# Patient Record
Sex: MALE | Race: WHITE | NOT HISPANIC OR LATINO | Employment: OTHER | URBAN - METROPOLITAN AREA
[De-identification: names, ages, dates, MRNs, and addresses within clinical notes are randomized per-mention and may not be internally consistent; named-entity substitution may affect disease eponyms.]

---

## 2017-03-21 RX ORDER — ATENOLOL 50 MG/1
TABLET ORAL
Qty: 60 TABLET | Refills: 0 | Status: SHIPPED | OUTPATIENT
Start: 2017-03-21 | End: 2017-04-28 | Stop reason: SDUPTHER

## 2017-03-21 NOTE — TELEPHONE ENCOUNTER
Notify patient that his atenolol prescription was sent in; needs to schedule follow-up appointment for reassessment if not already done if he has already done please keep his follow-up

## 2017-03-21 NOTE — TELEPHONE ENCOUNTER
----- Message from Audrey Currie sent at 3/21/2017 12:52 PM CDT -----  Contact: Patient  Jadiel, Patient [phone], Patient is calling about recent visit to urgent care for knee pain. He was told he had an infection in the right knee and was given medication. He is also worried about it being septic and is inquiring about possible blood work needed. Please advise. Thanks.

## 2017-03-21 NOTE — TELEPHONE ENCOUNTER
Spoke with pt and informed him that he needs to come in for evaluation pt scheduled appt for 03/27/2017 @ 3:00 pm.

## 2017-03-25 RX ORDER — AMLODIPINE BESYLATE 5 MG/1
5 TABLET ORAL DAILY
COMMUNITY
End: 2017-03-27 | Stop reason: SDUPTHER

## 2017-03-25 RX ORDER — TALC
POWDER (GRAM) TOPICAL DAILY
COMMUNITY
End: 2017-03-27

## 2017-03-25 RX ORDER — ALBUTEROL SULFATE 90 UG/1
2 AEROSOL, METERED RESPIRATORY (INHALATION) EVERY 6 HOURS PRN
COMMUNITY
End: 2017-04-27 | Stop reason: SDUPTHER

## 2017-03-25 RX ORDER — INDAPAMIDE 1.25 MG/1
2.5 TABLET ORAL DAILY
COMMUNITY
End: 2017-03-27 | Stop reason: SDUPTHER

## 2017-03-25 RX ORDER — LISINOPRIL 20 MG/1
20 TABLET ORAL 2 TIMES DAILY
COMMUNITY
End: 2017-11-28 | Stop reason: SDUPTHER

## 2017-03-25 RX ORDER — MULTIVITAMIN
1 TABLET ORAL DAILY
COMMUNITY
End: 2022-11-18

## 2017-03-25 RX ORDER — METFORMIN HYDROCHLORIDE 500 MG/1
500 TABLET ORAL 2 TIMES DAILY WITH MEALS
COMMUNITY
End: 2017-05-12 | Stop reason: SDUPTHER

## 2017-03-25 RX ORDER — ASPIRIN 81 MG/1
81 TABLET ORAL DAILY
COMMUNITY
End: 2018-10-03

## 2017-03-25 RX ORDER — LANCETS 28 GAUGE
EACH MISCELLANEOUS
COMMUNITY
End: 2020-03-12

## 2017-03-25 RX ORDER — PHENYLEPHRINE HCL 10 MG
500 TABLET ORAL DAILY
COMMUNITY
End: 2018-10-03

## 2017-03-25 RX ORDER — ATORVASTATIN CALCIUM 20 MG/1
20 TABLET, FILM COATED ORAL DAILY
COMMUNITY
End: 2017-05-18

## 2017-03-27 ENCOUNTER — OFFICE VISIT (OUTPATIENT)
Dept: FAMILY MEDICINE | Facility: CLINIC | Age: 72
End: 2017-03-27
Payer: MEDICARE

## 2017-03-27 VITALS
BODY MASS INDEX: 31.23 KG/M2 | DIASTOLIC BLOOD PRESSURE: 80 MMHG | OXYGEN SATURATION: 96 % | TEMPERATURE: 98 F | HEART RATE: 71 BPM | SYSTOLIC BLOOD PRESSURE: 139 MMHG | HEIGHT: 70 IN | WEIGHT: 218.13 LBS

## 2017-03-27 DIAGNOSIS — E11.9 TYPE 2 DIABETES MELLITUS WITHOUT COMPLICATION, WITHOUT LONG-TERM CURRENT USE OF INSULIN: ICD-10-CM

## 2017-03-27 DIAGNOSIS — Z15.89 METHYLENETETRAHYDROFOLATE REDUCTASE (MTHFR) GENE MUTATION: ICD-10-CM

## 2017-03-27 DIAGNOSIS — M25.561 PAIN AND SWELLING OF RIGHT KNEE: ICD-10-CM

## 2017-03-27 DIAGNOSIS — E03.9 HYPOTHYROIDISM, UNSPECIFIED TYPE: ICD-10-CM

## 2017-03-27 DIAGNOSIS — L03.115 CELLULITIS OF KNEE, RIGHT: Primary | ICD-10-CM

## 2017-03-27 DIAGNOSIS — I38 VALVULAR HEART DISEASE: ICD-10-CM

## 2017-03-27 DIAGNOSIS — G47.33 OSA (OBSTRUCTIVE SLEEP APNEA): ICD-10-CM

## 2017-03-27 DIAGNOSIS — M25.461 PAIN AND SWELLING OF RIGHT KNEE: ICD-10-CM

## 2017-03-27 DIAGNOSIS — I65.29 CAROTID ARTERY PLAQUE, UNSPECIFIED LATERALITY: ICD-10-CM

## 2017-03-27 DIAGNOSIS — D64.9 ANEMIA, UNSPECIFIED TYPE: ICD-10-CM

## 2017-03-27 DIAGNOSIS — E78.2 MIXED HYPERLIPIDEMIA: ICD-10-CM

## 2017-03-27 DIAGNOSIS — E66.09 NON MORBID OBESITY DUE TO EXCESS CALORIES: ICD-10-CM

## 2017-03-27 DIAGNOSIS — I10 ESSENTIAL HYPERTENSION: ICD-10-CM

## 2017-03-27 LAB — GLUCOSE SERPL-MCNC: 107 MG/DL (ref 70–110)

## 2017-03-27 PROCEDURE — 4010F ACE/ARB THERAPY RXD/TAKEN: CPT | Mod: S$GLB,,, | Performed by: INTERNAL MEDICINE

## 2017-03-27 PROCEDURE — 1157F ADVNC CARE PLAN IN RCRD: CPT | Mod: S$GLB,,, | Performed by: INTERNAL MEDICINE

## 2017-03-27 PROCEDURE — 1125F AMNT PAIN NOTED PAIN PRSNT: CPT | Mod: S$GLB,,, | Performed by: INTERNAL MEDICINE

## 2017-03-27 PROCEDURE — 1160F RVW MEDS BY RX/DR IN RCRD: CPT | Mod: S$GLB,,, | Performed by: INTERNAL MEDICINE

## 2017-03-27 PROCEDURE — 3079F DIAST BP 80-89 MM HG: CPT | Mod: S$GLB,,, | Performed by: INTERNAL MEDICINE

## 2017-03-27 PROCEDURE — 99999 PR PBB SHADOW E&M-EST. PATIENT-LVL III: CPT | Mod: PBBFAC,,, | Performed by: INTERNAL MEDICINE

## 2017-03-27 PROCEDURE — 3046F HEMOGLOBIN A1C LEVEL >9.0%: CPT | Mod: S$GLB,,, | Performed by: INTERNAL MEDICINE

## 2017-03-27 PROCEDURE — 82948 REAGENT STRIP/BLOOD GLUCOSE: CPT | Mod: S$GLB,,, | Performed by: INTERNAL MEDICINE

## 2017-03-27 PROCEDURE — 3075F SYST BP GE 130 - 139MM HG: CPT | Mod: S$GLB,,, | Performed by: INTERNAL MEDICINE

## 2017-03-27 PROCEDURE — 99215 OFFICE O/P EST HI 40 MIN: CPT | Mod: S$GLB,,, | Performed by: INTERNAL MEDICINE

## 2017-03-27 PROCEDURE — 1159F MED LIST DOCD IN RCRD: CPT | Mod: S$GLB,,, | Performed by: INTERNAL MEDICINE

## 2017-03-27 PROCEDURE — 2022F DILAT RTA XM EVC RTNOPTHY: CPT | Mod: S$GLB,,, | Performed by: INTERNAL MEDICINE

## 2017-03-27 RX ORDER — AMLODIPINE BESYLATE 5 MG/1
5 TABLET ORAL DAILY
Qty: 30 TABLET | Refills: 3 | Status: SHIPPED | OUTPATIENT
Start: 2017-03-27 | End: 2017-06-02

## 2017-03-27 RX ORDER — DICLOXACILLIN SODIUM 500 MG/1
CAPSULE ORAL
Refills: 0 | COMMUNITY
Start: 2017-03-22 | End: 2017-05-18 | Stop reason: SDUPTHER

## 2017-03-27 RX ORDER — SULFAMETHOXAZOLE AND TRIMETHOPRIM 800; 160 MG/1; MG/1
1 TABLET ORAL 2 TIMES DAILY
Qty: 20 TABLET | Refills: 0 | Status: SHIPPED | OUTPATIENT
Start: 2017-03-27 | End: 2017-05-18 | Stop reason: ALTCHOICE

## 2017-03-27 RX ORDER — INDAPAMIDE 1.25 MG/1
1.25 TABLET ORAL DAILY
Qty: 30 TABLET | Refills: 3 | Status: SHIPPED | OUTPATIENT
Start: 2017-03-27 | End: 2017-06-01 | Stop reason: SDUPTHER

## 2017-03-27 RX ORDER — DOXYCYCLINE 100 MG/1
100 CAPSULE ORAL EVERY 12 HOURS
Qty: 20 CAPSULE | Refills: 0 | Status: SHIPPED | OUTPATIENT
Start: 2017-03-27 | End: 2017-05-18 | Stop reason: ALTCHOICE

## 2017-03-27 RX ORDER — OXYCODONE AND ACETAMINOPHEN 5; 325 MG/1; MG/1
TABLET ORAL
Refills: 0 | COMMUNITY
Start: 2017-03-21 | End: 2017-05-18

## 2017-03-27 NOTE — MR AVS SNAPSHOT
[facility]  [address]  [place] [address]  Phone: [phone]  Fax: [phone]                  Jadiel Rossi   3/27/2017 3:00 PM   Office Visit    Description:  Male : 1945   Provider:  Eliud Chandler MD   Department:  [facility]           Reason for Visit     Follow-up     Knee Pain           Diagnoses this Visit        Comments    Cellulitis of knee, right    -  Primary     Anemia, unspecified type         ROBBY (obstructive sleep apnea)         Non morbid obesity due to excess calories         Hypothyroidism, unspecified type         Essential hypertension         Mixed hyperlipidemia         Type 2 diabetes mellitus without complication, without long-term current use of insulin         Valvular heart disease         Carotid artery plaque, unspecified laterality         Methylenetetrahydrofolate reductase (MTHFR) gene mutation                To Do List           Future Appointments        Provider Department Dept Phone    5/15/2017 3:40 PM Eliud Chandler MD [facility] [phone]      Goals (5 Years of Data)     None      Follow-Up and Disposition     Return in about 10 days (around 2017) for Reassessment and go over her labs.    Follow-up and Disposition History       These Medications        Disp Refills Start End    amlodipine (NORVASC) 5 MG tablet 30 tablet 3 3/27/2017     Take 1 tablet (5 mg total) by mouth once daily. - Oral    Pharmacy: [facility] [address] [place] [address] AT [place] [address] & [facility] Ph #: [phone]       indapamide (LOZOL) 1.25 MG Tab 30 tablet 3 3/27/2017     Take 1 tablet (1.25 mg total) by mouth once daily. - Oral    Pharmacy: [facility] [place] [address] AT [place] [address] & [facility] Ph #: [phone]       doxycycline (VIBRAMYCIN) 100 MG Cap 20 capsule 0 3/27/2017     Take 1 capsule (100 mg total) by mouth every  12 (twelve) hours. Take for 10 days pc. - Oral    Pharmacy: [facility] [address] [place] [address] Ph #: [phone]       sulfamethoxazole-trimethoprim 800-160mg (BACTRIM DS) 800-160 mg Tab 20 tablet 0 3/27/2017     Take 1 tablet by mouth 2 (two) times daily. Take for 10 days pc. - Oral    Pharmacy: [facility] [address] Ph #: [phone]         [facility]     [facility] - 24/7 Assistance  Registered nurses in the [facility] provide clinical advisement, health education, appointment booking, and other advisory services.  Call for this free service at [phone].             Medications           Message regarding Medications     Verify the changes and/or additions to your medication regime listed below are the same as discussed with your clinician today.  If any of these changes or additions are incorrect, please notify your healthcare provider.        START taking these NEW medications        Refills    doxycycline (VIBRAMYCIN) 100 MG Cap 0    Sig: Take 1 capsule (100 mg total) by mouth every 12 (twelve) hours. Take for 10 days pc.    Class: Normal    Route: Oral    sulfamethoxazole-trimethoprim 800-160mg (BACTRIM DS) 800-160 mg Tab 0    Sig: Take 1 tablet by mouth 2 (two) times daily. Take for 10 days pc.    Class: Normal    Route: Oral      CHANGE how you are taking these medications     Start Taking Instead of    amlodipine (NORVASC) 5 MG tablet amlodipine (NORVASC) 5 MG tablet    Dosage:  Take 1 tablet (5 mg total) by mouth once daily. Dosage:  Take 5 mg by mouth once daily.    Reason for Change:  Reorder     indapamide (LOZOL) 1.25 MG Tab indapamide (LOZOL) 1.25 MG Tab    Dosage:  Take 1 tablet (1.25 mg total) by mouth once daily. Dosage:  Take 2.5 mg by mouth once daily.    Reason for Change:  Reorder       STOP taking these medications      magnesium oxide (MAG-OX) 250 mg Tab Take by mouth once daily.           Verify that the below list of medications is an accurate representation of the medications you are currently taking.  If none reported, the list may be blank. If incorrect, please contact your healthcare provider. Carry this list with you in case of emergency.           Current Medications     albuterol (VENTOLIN HFA) 90 mcg/actuation inhaler Inhale 2 puffs into the lungs every 6 (six) hours as needed for Wheezing. Rescue    amlodipine (NORVASC) 5 MG tablet Take 1 tablet (5 mg total) by mouth once daily.    aspirin (ECOTRIN) 81 MG EC tablet Take 81 mg by mouth once daily.    atenolol (TENORMIN) 50 MG tablet TAKE 1 TABLET BY MOUTH TWICE DAILY    atorvastatin (LIPITOR) 20 MG tablet Take 20 mg by mouth once daily.    blood sugar diagnostic (BLOOD GLUCOSE TEST) Strp by Misc.(Non-Drug; Combo Route) route.    cinnamon bark (CINNAMON) 500 mg capsule Take 500 mg by mouth once daily.    dicloxacillin (DYNAPEN) 500 MG capsule TK ONE C PO Q 8 H AFTER MEALS FOR 10 DAYS    DOCOSAHEXANOIC ACID/EPA (FISH OIL ORAL) Take 1,200 mg by mouth 2 (two) times daily.    indapamide (LOZOL) 1.25 MG Tab Take 1 tablet (1.25 mg total) by mouth once daily.    lancets (EASY TOUCH LANCETS) 28 gauge Misc by Misc.(Non-Drug; Combo Route) route.    lisinopril (PRINIVIL,ZESTRIL) 20 MG tablet Take 20 mg by mouth 2 (two) times daily.    metformin (GLUCOPHAGE) 500 MG tablet Take 500 mg by mouth 2 (two) times daily with meals.    multivitamin (ONE DAILY MULTIVITAMIN) per tablet Take 1 tablet by mouth once daily.    TURMERIC ROOT EXTRACT ORAL Take 500 mg by mouth once daily.    doxycycline (VIBRAMYCIN) 100 MG Cap Take 1 capsule (100 mg total) by mouth every 12 (twelve) hours. Take for 10 days pc.    oxycodone-acetaminophen (PERCOCET) 5-325 mg per tablet TK 1 T PO Q 8 H PRN    sulfamethoxazole-trimethoprim 800-160mg (BACTRIM DS) 800-160 mg Tab Take 1 tablet by mouth 2 (two) times  "daily. Take for 10 days pc.           Clinical Reference Information           Your Vitals Were     BP Pulse Temp Height Weight SpO2    139/80 (BP Location: Right arm, Patient Position: Sitting, BP Method: Manual) 71 98.3 °F (36.8 °C) (Oral) 5' 10" (1.778 m) 98.9 kg (218 lb 2.3 oz) 96%    BMI                31.3 kg/m2          Blood Pressure          Most Recent Value    BP  139/80      Allergies as of 3/27/2017     No Known Allergies      Immunizations Administered on Date of Encounter - 3/27/2017     None      Orders Placed During Today's Visit      Normal Orders This Visit    POCT Glucose     Future Labs/Procedures Expected by Expires    CBC auto differential  3/27/2017 5/26/2018    HEMOGLOBIN A1C  3/27/2017 5/26/2018    Homocysteine, serum  3/27/2017 5/26/2018    Magnesium  3/27/2017 5/26/2018    T3, free  3/27/2017 5/26/2018    T4, free  3/27/2017 5/26/2018    TSH  3/27/2017 5/26/2018    Uric acid  3/27/2017 5/26/2018    Urinalysis  3/27/2017 5/26/2018    Comprehensive metabolic panel  6/25/2017 5/26/2018    Lipid panel  6/25/2017 5/26/2018         3/27/2017  4:36 PM - Aliyah Whatley MA      Component Results     Component Value Flag Ref Range Units Status    POC Glucose 107  70 - 110 mg/dL Final            [facility] Sign-Up     Activating your [facility] account is as easy as 1-2-3!     1) Visit [email or web address], select Sign Up Now, enter this activation code and your date of birth, then select Next.  [email or web address]  Expires: 5/11/2017  3:30 PM      2) Create a username and password to use when you visit [facility] in the future and select a security question in case you lose your password and select Next.    3) Enter your e-mail address and click Sign Up!    Additional Information  If you have questions, please e-mail [email or web address] or call [phone] to talk to our [facility] staff. Remember, [facility] is NOT to be used for urgent needs. For medical emergencies, dial 911.         Language " Assistance Services     ATTENTION: Language assistance services are available, free of charge. Please call [phone].      ATENCIÓN: Si habla melissa, tiene a mcdaniels disposición servicios gratuitos de asistencia lingüística. Llame al [phone].     CHÚ Ý: N?u b?n nói Ti?ng Vi?t, có các d?ch v? h? tr? ngôn ng? mi?n phí dành cho b?n. G?i s? [phone].         [facility] complies with applicable Federal civil rights laws and does not discriminate on the basis of race, color, national origin, age, disability, or sex.

## 2017-03-27 NOTE — PROGRESS NOTES
Subjective:       Patient ID: Jadiel Rossi is a 71 y.o. male.    Chief Complaint: Follow-up (establish care ) and Knee Pain (right knee x 10 days )    HPI patient's a very pleasant 71-year-old established male patient of mine here to get re-established with me at the [facility].  He has a history of diabetes mellitus type 2, essential hypertension, hypothyroidism, mixed hyperlipidemia, obstructive sleep apnea and obesity. He has been reportedly  out of his amlodipine for 3 weeks, as well as his indapamide.  His capillary blood glucoses at home have been averaging 104-106.  He does watch his diabetic diet.  However he has not been monitoring his blood pressures at home.  This will be resumed upon reminding him.  His blood pressure today is 139/80 with a pulse 71.  He exercises with walking with school; he is a .      He sees cardiology  and has a left heart catheterization pending; this put on hold due to cellulitis of his R knee.  He reportedly has a high calcium score on evaluation and a positive nuclear exercise stress test with a normal stress EKG.;  Review of old records his last office visit seen by me that's available as 2/1/17.  He has no complaints of chest pain, shortness of breath or palpitations.      Additionally he's on dicloxacillin 500 mg every 8 hours for a 10 day course has been on it for roughly 1 week for right knee cellulitis he reportedly went to work at [employer] lifted about a 60 pound [place] chicken swelling around and placed it at lower level X days ago and twisted his knee pain at night and initially was 7-8/10.  He is reportedly used Advil with ice applications.  He went to [facility] Monday morning; roughly about 1 week ago; a 3 view x-ray of the knee there was negative by his account and he was placed on Percocet 5/325 one every 8 hours as needed for pain; review x-ray report from 3/21/17 of the right knee revealed regional degenerative  changes present.  No acute fracture dislocation is identified.  No large joint effusion or patellar tilt.  Scattered vascular calcifications are noted.  Impression:  right knee degenerative changes with no convincing acute osseous abnormality.  He was also placed on dicloxacillin 500 mg 1 every 8 hours for 10 days for cellulitis.  No complaints of fever or chills.  He also reportedly walked into a table or counter hitting the lateral aspect of his knee; the details which are vague.      Various diagnosis's were discussed as well as plan of care.  Medications were addressed and blood work was probably ordered for his follow-up.  Total time 3:25 PM through 4:25 PM.  Appropriate labs were ordered for follow-up.     Review of Systems   Constitutional: Negative for appetite change, fatigue, fever and unexpected weight change.   HENT: Negative for congestion, postnasal drip and sinus pressure.         Seasonal allergies/ perennial as well.   Eyes: Negative for discharge and itching.   Respiratory: Negative for cough, chest tightness, shortness of breath and wheezing.    Cardiovascular: Negative for chest pain, palpitations and leg swelling.   Gastrointestinal: Negative for abdominal pain, blood in stool, constipation, diarrhea, nausea and vomiting.   Endocrine:        DM2/ HLP/ hypothyroidism, not on supplements.   Genitourinary: Negative for dysuria, hematuria and urgency.   Musculoskeletal: Negative for arthralgias and myalgias.        Variable MSK pain.   Skin: Negative for rash.   Allergic/Immunologic: Positive for environmental allergies. Negative for food allergies and immunocompromised state.   Neurological: Negative for tremors, seizures, syncope and headaches.   Hematological: Negative for adenopathy. Does not bruise/bleed easily.   Psychiatric/Behavioral:        Denies anxiety or depression.       Objective:      Vitals:    03/27/17 1511   BP: 139/80   BP Location: Right arm   Patient Position: Sitting   BP  "Method: Manual   Pulse: 71   Temp: 98.3 °F (36.8 °C)   TempSrc: Oral   SpO2: 96%   Weight: 98.9 kg (218 lb 2.3 oz)   Height: 5' 10" (1.778 m)     Body mass index is 31.3 kg/(m^2).    Physical Exam   Constitutional: He is oriented to person, place, and time. He appears well-developed and well-nourished.   HENT:   Head: Normocephalic and atraumatic.   Eyes: EOM are normal.   Neck: Normal range of motion. Neck supple. No thyromegaly present.   No bruits heard.   Cardiovascular: Normal rate and regular rhythm.  Exam reveals no gallop.    Murmur heard.  MARCO ANTONIO 2-3/6 apex.   Pulmonary/Chest: Effort normal and breath sounds normal. No respiratory distress. He has no wheezes. He has no rales.   Abdominal: Soft. Bowel sounds are normal. He exhibits no distension. There is no tenderness. There is no rebound and no guarding.   Musculoskeletal: Normal range of motion. He exhibits edema.   Right knee with mild crepitance to range of motion as well as mild decreased range of motion.  Increased warmth to knee appreciated.  Mild erythema appreciated over the patella.  As well as mild edema; mild tenderness to palpation laterally   Lymphadenopathy:     He has no cervical adenopathy.   Neurological: He is alert and oriented to person, place, and time.   Moves all 4 extremities fine.   Skin: No rash noted.   Psychiatric: He has a normal mood and affect. His behavior is normal. Thought content normal.   Vitals reviewed.      Assessment:       1. Cellulitis of knee, right    2. Pain and swelling of right knee    3. Anemia, unspecified type    4. ROBBY (obstructive sleep apnea)    5. Non morbid obesity due to excess calories    6. Hypothyroidism, unspecified type    7. Essential hypertension    8. Mixed hyperlipidemia    9. Type 2 diabetes mellitus without complication, without long-term current use of insulin    10. Valvular heart disease    11. Carotid artery plaque, unspecified laterality    12. Methylenetetrahydrofolate reductase (MTHFR) " gene mutation        Plan:       Cellulitis of knee, right; ice his R knee; try to stay off knee as much as possible; has pain meds; stop diclox. To hold on [facility] by Dr Ward til cellulitis resolved.  Needs to try and stay off his right knee is much as possible and elevate his lower extremity; apply ice applications as needed; has pain management with Percocet already placed.  With high calcium score as well as a positive nuclear exercise stress test, Dr. Ward had planned left heart catheterization; this however is on hold till cellulitis clears up of his right knee.  -     doxycycline (VIBRAMYCIN) 100 MG Cap; Take 1 capsule (100 mg total) by mouth every 12 (twelve) hours. Take for 10 days pc.  Dispense: 20 capsule; Refill: 0  -     sulfamethoxazole-trimethoprim 800-160mg (BACTRIM DS) 800-160 mg Tab; Take 1 tablet by mouth 2 (two) times daily. Take for 10 days pc.  Dispense: 20 tablet; Refill: 0  -     Uric acid; Future; Expected date: 3/27/17    Anemia, unspecified type  -     CBC auto differential; Future; Expected date: 3/27/17    ROBBY (obstructive sleep apnea); weight reduction needed  Intolerant of CPAP.    Non morbid obesity due to excess calories; caloric restriction w weight reduction.  Upper body torso exercise    Hypothyroidism, unspecified type; need updated thyroid function test  -     TSH; Future; Expected date: 3/27/17  -     T4, free; Future; Expected date: 3/27/17  -     T3, free; Future; Expected date: 3/27/17    Essential hypertension; monitor BP at home; keep a log; < 2 Gm Na a day diet. Meds addressed/refilled; renewed his amlodipine/indapamide.         Patient is also on atenolol and lisinopril.  Exercise and weight reduction will help this entity as well   -     Comprehensive metabolic panel; Future; Expected date: 6/25/17  -     amlodipine (NORVASC) 5 MG tablet; Take 1 tablet (5 mg total) by mouth once daily.  Dispense: 30 tablet; Refill: 3  -     indapamide (LOZOL) 1.25 MG Tab; Take 1 tablet  (1.25 mg total) by mouth once daily.  Dispense: 30 tablet; Refill: 3  -     Magnesium; Future; Expected date: 3/27/17  -     Urinalysis; Future; Expected date: 3/27/17    Mixed hyperlipidemia; high fiber/low fat diet; on atorvastatin 20 mg BID. Can change to 40 mg once a day.  -     Comprehensive metabolic panel; Future; Expected date: 6/25/17  -     Lipid panel; Future; Expected date: 6/25/17  -     TSH; Future; Expected date: 3/27/17and lisinopril  -     T4, free; Future; Expected date: 3/27/17  -     T3, free; Future; Expected date: 3/27/17    Type 2 diabetes mellitus without complication, without long-term current use of insulin; monitor his CBG's at home.  Continue his metformin         supplements; Hemoglobin A1c update needed  -     Comprehensive metabolic panel; Future; Expected date: 6/25/17  -     HEMOGLOBIN A1C; Future; Expected date: 3/27/17  -     Urinalysis; Future; Expected date: 3/27/17    Valvular heart disease; no chest pain or SOB; need copy last echo from Dr Ward.    Carotid artery plaque, unspecified laterality; need copy of Dr Ward's carotid US; also need copy of [facility] facility for review as well    Methylenetetrahydrofolate reductase (MTHFR) gene mutation; if above 12 on his homocystine level he will need institution of Foltx therapy  -     Homocysteine, serum; Future; Expected date: 3/27/17

## 2017-04-11 ENCOUNTER — TELEPHONE (OUTPATIENT)
Dept: FAMILY MEDICINE | Facility: CLINIC | Age: 72
End: 2017-04-11

## 2017-04-11 NOTE — TELEPHONE ENCOUNTER
----- Message from Rima Jiménez sent at 4/11/2017 11:15 AM CDT -----  Contact: Jadiel  Wants to get cbc done to make sure infection is gone before procedure. Please call back [phone] (home)   Thank you!

## 2017-04-11 NOTE — TELEPHONE ENCOUNTER
Pt wants to get a CBC done at [facility] to make sure that infection is gone before he gets his procedure (Angiogram)  done on 04/25/2017.

## 2017-04-12 NOTE — TELEPHONE ENCOUNTER
Please notify patient that he was due back to see us for evaluation of his right knee cellulitis roughly several days ago he already has a CBC waiting with blood work is supposed to be obtained prior to the visit.  Please obtain the blood work before his office visit and schedule an office visit with us if he hasn't already done so.  He was due back to see us in 10 days after his last office visit

## 2017-04-12 NOTE — TELEPHONE ENCOUNTER
----- Message from Jhon Moreno sent at 4/12/2017  8:37 AM CDT -----  Contact: Patient  Patient called with questions regarding blood work. Please call back after 4:00 pm at [phone]. Thanks,

## 2017-04-12 NOTE — TELEPHONE ENCOUNTER
Lm for pt to contact office. Pt needs to schedule appt with in the next 7-10 days with labs prior.

## 2017-04-13 ENCOUNTER — TELEPHONE (OUTPATIENT)
Dept: FAMILY MEDICINE | Facility: CLINIC | Age: 72
End: 2017-04-13

## 2017-04-13 NOTE — TELEPHONE ENCOUNTER
Spoke with pt and informed him that [facility] and that he needs to been seen in about 10 days; he has an appt scheduled for 05/15/2017 and tried to get him in sooner and he said to keep him at that appt date. He will go get his labs done Tuesday 04/18/2017.

## 2017-04-13 NOTE — TELEPHONE ENCOUNTER
----- Message from Kim Wild sent at 4/13/2017 10:33 AM CDT -----  Patient is in need to speak to Aliyah concerning scheduling a test for him. Please call back after 3:00pm at [phone].

## 2017-04-20 LAB
BASOPHILS # BLD AUTO: 0 X10E3/UL (ref 0–0.2)
BASOPHILS NFR BLD AUTO: 0 %
EOSINOPHIL # BLD AUTO: 0.6 X10E3/UL (ref 0–0.4)
EOSINOPHIL NFR BLD AUTO: 7 %
ERYTHROCYTE [DISTWIDTH] IN BLOOD BY AUTOMATED COUNT: 14 % (ref 12.3–15.4)
HBA1C MFR BLD: 6.6 % (ref 4.8–5.6)
HCT VFR BLD AUTO: 38.2 % (ref 37.5–51)
HGB BLD-MCNC: 12.3 G/DL (ref 12.6–17.7)
IMM GRANULOCYTES # BLD: 0 X10E3/UL (ref 0–0.1)
IMM GRANULOCYTES NFR BLD: 0 %
LYMPHOCYTES # BLD AUTO: 2.1 X10E3/UL (ref 0.7–3.1)
LYMPHOCYTES NFR BLD AUTO: 26 %
MAGNESIUM SERPL-MCNC: 1.7 MG/DL (ref 1.6–2.3)
MCH RBC QN AUTO: 31.9 PG (ref 26.6–33)
MCHC RBC AUTO-ENTMCNC: 32.2 G/DL (ref 31.5–35.7)
MCV RBC AUTO: 99 FL (ref 79–97)
MONOCYTES # BLD AUTO: 0.5 X10E3/UL (ref 0.1–0.9)
MONOCYTES NFR BLD AUTO: 7 %
NEUTROPHILS # BLD AUTO: 4.8 X10E3/UL (ref 1.4–7)
NEUTROPHILS NFR BLD AUTO: 60 %
PLATELET # BLD AUTO: 206 X10E3/UL (ref 150–379)
RBC # BLD AUTO: 3.86 X10E6/UL (ref 4.14–5.8)
WBC # BLD AUTO: 8.1 X10E3/UL (ref 3.4–10.8)

## 2017-04-21 ENCOUNTER — TELEPHONE (OUTPATIENT)
Dept: FAMILY MEDICINE | Facility: CLINIC | Age: 72
End: 2017-04-21

## 2017-04-21 NOTE — TELEPHONE ENCOUNTER
----- Message from Jaylon Hernandez sent at 4/21/2017 11:16 AM CDT -----  Contact: Patient  Patient states that the labs that Dr Chandler has, the copy has to be sent to Dr Barajas.  Can you please call the patient back at [phone].  Thank you

## 2017-04-24 ENCOUNTER — TELEPHONE (OUTPATIENT)
Dept: FAMILY MEDICINE | Facility: CLINIC | Age: 72
End: 2017-04-24

## 2017-04-24 NOTE — TELEPHONE ENCOUNTER
----- Message from Rima Jiménez sent at 4/24/2017  9:25 AM CDT -----  Contact: Jadiel  Patient would like to be called back regarding his blood work. Call on [phone]. thanks1

## 2017-04-24 NOTE — TELEPHONE ENCOUNTER
Spoke with pt and informed him that [facility] 04/20/2017 for Dr. Chandler to review and send a copy to Dr. Ward due to pt his having angiogram done tomorrow.

## 2017-04-24 NOTE — TELEPHONE ENCOUNTER
Pt asked that you go over his lab results that he had done on 04/19/2017 and a copy be sent to Dr. Ward due to he is having angiogram tomorrow.

## 2017-04-27 NOTE — TELEPHONE ENCOUNTER
Pt had 1 stent placed by Dr. Ward on 04/25/2017.     LOV 03/27/2017     Follow up scheduled for 05/15/2017

## 2017-04-27 NOTE — TELEPHONE ENCOUNTER
----- Message from Leonila Tran sent at 4/27/2017 10:46 AM CDT -----  Contact: pt  Message for Aliyah  Pt want you to call him, (did not provide me with information)  Call back on # [phone]  thanks

## 2017-04-28 RX ORDER — ALBUTEROL SULFATE 90 UG/1
2 AEROSOL, METERED RESPIRATORY (INHALATION) EVERY 6 HOURS PRN
Qty: 1 INHALER | Refills: 2 | Status: SHIPPED | OUTPATIENT
Start: 2017-04-28 | End: 2017-09-05 | Stop reason: SDUPTHER

## 2017-04-28 RX ORDER — ATENOLOL 50 MG/1
50 TABLET ORAL 2 TIMES DAILY
Qty: 60 TABLET | Refills: 2 | Status: SHIPPED | OUTPATIENT
Start: 2017-04-28 | End: 2017-07-24 | Stop reason: SDUPTHER

## 2017-04-28 NOTE — TELEPHONE ENCOUNTER
----- Message from Sonam Wahl sent at 4/28/2017  1:28 PM CDT -----  Contact:   call  //[phone]    Calling to  Speak to  The  Nurse // please call

## 2017-04-28 NOTE — TELEPHONE ENCOUNTER
Please notify patient that his albuterol rescue inhaler was sent in for when necessary use and  keep his follow-up appointment

## 2017-05-01 ENCOUNTER — PATIENT OUTREACH (OUTPATIENT)
Dept: ADMINISTRATIVE | Facility: HOSPITAL | Age: 72
End: 2017-05-01

## 2017-05-01 NOTE — LETTER
May 1, 2017    Jadiel Flo  [address]  [place] [address]             [facility]  [address]  [facility] [address]  Phone: [phone] Dear Mr. Rossi:    [facility] is committed to your overall health.  To help you get the most out of each of your visits, we will review your information to make sure you are up to date on all of your recommended tests and/or procedures.      Dr. Eliud Chandler is a new provider to us and we may not have your complete medical records on file here at [facility].  We have requested his records from the [facility].  The records we have received so far show that you may be due for your fasting cholesterol labs, a urine test for your kidneys, your annual diabetic eye and foot exams, colon cancer screening, hepatitis C screening, and possibly some immunizations (pneumonia, shingles, and tetanus).    Medicare does not cover all immunizations to be given in the clinic.  Check your benefits to ensure that you do not need to receive your immunizations at the pharmacy.    If you have had any of the above done at another facility, please bring the records or information with you so that your record at [facility] will be complete.    If you are currently taking medication, please bring it with you to your appointment for review.    Also, if you have any type of Advanced Directives, please bring them with you to your office visit so we may scan them into your chart.    If you have any questions or concerns, please don't hesitate to call.    Thank you for letting us care for you,  Valerie Buckner LPN Clinical Care Coordinator  [facility]  [phone]

## 2017-05-12 RX ORDER — METFORMIN HYDROCHLORIDE 500 MG/1
TABLET ORAL
Qty: 60 TABLET | Refills: 2 | Status: SHIPPED | OUTPATIENT
Start: 2017-05-12 | End: 2017-07-31 | Stop reason: SDUPTHER

## 2017-05-16 LAB
ALBUMIN SERPL-MCNC: 4.8 G/DL (ref 3.5–4.8)
ALBUMIN/GLOB SERPL: 1.8 {RATIO} (ref 1.2–2.2)
ALP SERPL-CCNC: 70 IU/L (ref 39–117)
ALT SERPL-CCNC: 28 IU/L (ref 0–44)
APPEARANCE UR: CLEAR
AST SERPL-CCNC: 25 IU/L (ref 0–40)
BILIRUB SERPL-MCNC: 0.3 MG/DL (ref 0–1.2)
BILIRUB UR QL STRIP: NEGATIVE
BUN SERPL-MCNC: 20 MG/DL (ref 8–27)
BUN/CREAT SERPL: 20 (ref 10–24)
CALCIUM SERPL-MCNC: 9.9 MG/DL (ref 8.6–10.2)
CHLORIDE SERPL-SCNC: 95 MMOL/L (ref 96–106)
CHOLEST SERPL-MCNC: 170 MG/DL (ref 100–199)
CO2 SERPL-SCNC: 25 MMOL/L (ref 18–29)
COLOR UR: YELLOW
CREAT SERPL-MCNC: 1 MG/DL (ref 0.76–1.27)
GLOBULIN SER CALC-MCNC: 2.6 G/DL (ref 1.5–4.5)
GLUCOSE SERPL-MCNC: 117 MG/DL (ref 65–99)
GLUCOSE UR QL: NEGATIVE
HCYS SERPL-SCNC: 13 UMOL/L (ref 0–15)
HDLC SERPL-MCNC: 32 MG/DL
HGB UR QL STRIP: NEGATIVE
KETONES UR QL STRIP: NEGATIVE
LDLC SERPL CALC-MCNC: 93 MG/DL (ref 0–99)
LEUKOCYTE ESTERASE UR QL STRIP: NEGATIVE
MICRO URNS: NORMAL
NITRITE UR QL STRIP: NEGATIVE
PH UR STRIP: 6 [PH] (ref 5–7.5)
POTASSIUM SERPL-SCNC: 4.9 MMOL/L (ref 3.5–5.2)
PROT SERPL-MCNC: 7.4 G/DL (ref 6–8.5)
PROT UR QL STRIP: NEGATIVE
SODIUM SERPL-SCNC: 141 MMOL/L (ref 134–144)
SP GR UR: 1.02 (ref 1–1.03)
T3FREE SERPL-MCNC: 2.9 PG/ML (ref 2–4.4)
T4 FREE SERPL-MCNC: 1.05 NG/DL (ref 0.82–1.77)
TRIGL SERPL-MCNC: 224 MG/DL (ref 0–149)
TSH SERPL DL<=0.005 MIU/L-ACNC: 5.11 UIU/ML (ref 0.45–4.5)
URATE SERPL-MCNC: 7.9 MG/DL (ref 3.7–8.6)
UROBILINOGEN UR STRIP-MCNC: 0.2 MG/DL (ref 0.2–1)
VLDLC SERPL CALC-MCNC: 45 MG/DL (ref 5–40)

## 2017-05-18 ENCOUNTER — OFFICE VISIT (OUTPATIENT)
Dept: FAMILY MEDICINE | Facility: CLINIC | Age: 72
End: 2017-05-18
Payer: MEDICARE

## 2017-05-18 VITALS
HEIGHT: 70 IN | BODY MASS INDEX: 30.54 KG/M2 | OXYGEN SATURATION: 94 % | HEART RATE: 58 BPM | WEIGHT: 213.31 LBS | TEMPERATURE: 98 F | SYSTOLIC BLOOD PRESSURE: 138 MMHG | DIASTOLIC BLOOD PRESSURE: 85 MMHG

## 2017-05-18 DIAGNOSIS — I38 VALVULAR HEART DISEASE: ICD-10-CM

## 2017-05-18 DIAGNOSIS — D64.9 ANEMIA, UNSPECIFIED TYPE: ICD-10-CM

## 2017-05-18 DIAGNOSIS — E03.9 HYPOTHYROIDISM, UNSPECIFIED TYPE: ICD-10-CM

## 2017-05-18 DIAGNOSIS — I10 ESSENTIAL HYPERTENSION: ICD-10-CM

## 2017-05-18 DIAGNOSIS — E66.09 NON MORBID OBESITY DUE TO EXCESS CALORIES: ICD-10-CM

## 2017-05-18 DIAGNOSIS — Z95.5 S/P CORONARY ARTERY STENT PLACEMENT: ICD-10-CM

## 2017-05-18 DIAGNOSIS — G47.33 OSA (OBSTRUCTIVE SLEEP APNEA): ICD-10-CM

## 2017-05-18 DIAGNOSIS — Z15.89 METHYLENETETRAHYDROFOLATE REDUCTASE (MTHFR) GENE MUTATION: ICD-10-CM

## 2017-05-18 DIAGNOSIS — E11.9 TYPE 2 DIABETES MELLITUS WITHOUT COMPLICATION, WITHOUT LONG-TERM CURRENT USE OF INSULIN: ICD-10-CM

## 2017-05-18 DIAGNOSIS — E78.2 MIXED HYPERLIPIDEMIA: Primary | ICD-10-CM

## 2017-05-18 DIAGNOSIS — I65.29 CAROTID ARTERY PLAQUE, UNSPECIFIED LATERALITY: ICD-10-CM

## 2017-05-18 DIAGNOSIS — D75.89 MACROCYTOSIS: ICD-10-CM

## 2017-05-18 PROCEDURE — 3044F HG A1C LEVEL LT 7.0%: CPT | Mod: S$GLB,,, | Performed by: INTERNAL MEDICINE

## 2017-05-18 PROCEDURE — 99999 PR PBB SHADOW E&M-EST. PATIENT-LVL IV: CPT | Mod: PBBFAC,,, | Performed by: INTERNAL MEDICINE

## 2017-05-18 PROCEDURE — 1159F MED LIST DOCD IN RCRD: CPT | Mod: S$GLB,,, | Performed by: INTERNAL MEDICINE

## 2017-05-18 PROCEDURE — 3075F SYST BP GE 130 - 139MM HG: CPT | Mod: S$GLB,,, | Performed by: INTERNAL MEDICINE

## 2017-05-18 PROCEDURE — 4010F ACE/ARB THERAPY RXD/TAKEN: CPT | Mod: S$GLB,,, | Performed by: INTERNAL MEDICINE

## 2017-05-18 PROCEDURE — 99214 OFFICE O/P EST MOD 30 MIN: CPT | Mod: S$GLB,,, | Performed by: INTERNAL MEDICINE

## 2017-05-18 PROCEDURE — 1160F RVW MEDS BY RX/DR IN RCRD: CPT | Mod: S$GLB,,, | Performed by: INTERNAL MEDICINE

## 2017-05-18 PROCEDURE — 3079F DIAST BP 80-89 MM HG: CPT | Mod: S$GLB,,, | Performed by: INTERNAL MEDICINE

## 2017-05-18 PROCEDURE — 1126F AMNT PAIN NOTED NONE PRSNT: CPT | Mod: S$GLB,,, | Performed by: INTERNAL MEDICINE

## 2017-05-18 RX ORDER — AMLODIPINE BESYLATE 2.5 MG/1
TABLET ORAL
Qty: 30 TABLET | Refills: 2 | Status: SHIPPED | OUTPATIENT
Start: 2017-05-18 | End: 2017-06-02

## 2017-05-18 RX ORDER — NITROGLYCERIN 0.4 MG/1
TABLET SUBLINGUAL
Refills: 0 | COMMUNITY
Start: 2017-04-25

## 2017-05-18 RX ORDER — CLOPIDOGREL BISULFATE 75 MG/1
TABLET ORAL
Refills: 0 | COMMUNITY
Start: 2017-04-25 | End: 2018-10-03

## 2017-05-18 RX ORDER — ATORVASTATIN CALCIUM 40 MG/1
40 TABLET, FILM COATED ORAL DAILY
Qty: 30 TABLET | Refills: 3 | Status: SHIPPED | OUTPATIENT
Start: 2017-05-18 | End: 2017-10-01 | Stop reason: SDUPTHER

## 2017-05-18 NOTE — PROGRESS NOTES
Subjective:       Patient ID: Jadiel Rossi is a 71 y.o. male.    Chief Complaint: Follow up labs    HPI  Here for reassessment and to go over his labs. Not on CPAP can't tolerate it; tried recently. Borderline hypothyroid w no med yet. On low fat high fiber diet; Tolerates atorvastatin.  Labs reviewed with patient; 5/15/17 lipids revealed cholesterol 170, triglyceride 224, HDL 32, and LDL 93.  Hemoglobin A1c 6 recently was 6.6 with fasting blood sugar 117 GFR was normal at 76 and LFTs were normal. TSH 5.1, free T3 was 2.9 and free T4 was 1.05  Exercises none w injury from spider bite resolved w ab's now.     Recent LAD stent placed by Dr Ward, 4/26/17. Nuc EST w narrowing LAD; LHC w 70-75% lesion; LAD/stented; another 60-65%, left alone..CBG's running 107-115, controlled. Overall doing fine now.Recent carotid us w min carotid ds on R; none noted L. BP on avr at home 150-155/60, w P 57-60; no Ch pain, SOB, or palp.  Carotid ultrasound with minor right blockage the left side was fine I patient's account.  Repeat today 138/85 on vital signs with pulse 58.  Homocystine returned back slightly elevated at 13.    Review of Systems   Constitutional: Negative for appetite change and unexpected weight change.   HENT: Negative for congestion, postnasal drip and sinus pressure.    Eyes: Negative for discharge.   Respiratory: Negative for cough, chest tightness, shortness of breath and wheezing.    Cardiovascular: Negative for chest pain, palpitations and leg swelling.   Gastrointestinal: Negative for abdominal pain, blood in stool, constipation, diarrhea, nausea and vomiting.   Genitourinary: Negative for dysuria and hematuria.   Musculoskeletal: Negative for arthralgias and myalgias.   Skin: Negative for rash.   Neurological: Negative for tremors, seizures and headaches.   Hematological: Negative for adenopathy. Does not bruise/bleed easily.       Objective:      Vitals:    05/18/17 1509   BP: 138/85   BP Location: Right  "arm   Patient Position: Sitting   BP Method: Manual   Pulse: (!) 58   Temp: 97.9 °F (36.6 °C)   TempSrc: Oral   SpO2: (!) 94%   Weight: 96.7 kg (213 lb 4.7 oz)   Height: 5' 10" (1.778 m)     Body mass index is 30.6 kg/(m^2).    Physical Exam   Constitutional: He is oriented to person, place, and time. He appears well-developed and well-nourished.   HENT:   Head: Normocephalic and atraumatic.   Eyes: EOM are normal.   Neck: Normal range of motion. Neck supple. No thyromegaly present.   Carotid bruits vs. Rad of murmur.   Cardiovascular: Normal rate and regular rhythm.  Exam reveals no gallop.    No murmur heard.  MARCO ANTONIO: 3/6 aortic, 2/6 pulm, 3/6 LLSB, 2-3/6 apex.   Pulmonary/Chest: Effort normal and breath sounds normal. No respiratory distress. He has no wheezes. He has no rales.   Abdominal: Soft. Bowel sounds are normal. He exhibits no distension. There is no tenderness. There is no rebound and no guarding.   Musculoskeletal: Normal range of motion. He exhibits no edema.   Lymphadenopathy:     He has no cervical adenopathy.   Neurological: He is alert and oriented to person, place, and time.   Moves all 4 extremities fine.   Skin: No rash noted.   Psychiatric: He has a normal mood and affect. His behavior is normal. Thought content normal.   Vitals reviewed.      Assessment:       1. Mixed hyperlipidemia    2. Essential hypertension    3. ROBBY (obstructive sleep apnea)    4. S/P coronary artery stent placement    5. Type 2 diabetes mellitus without complication, without long-term current use of insulin    6. Carotid artery plaque, unspecified laterality    7. Valvular heart disease    8. Hypothyroidism, unspecified type    9. Non morbid obesity due to excess calories    10. Methylenetetrahydrofolate reductase (MTHFR) gene mutation    11. Anemia, unspecified type    12. Macrocytosis        Plan:       Mixed hyperlipidemia. Maintain low fat high fiber diet, light exercise regularly. Weight reduction; increase " atorvastatin to 40 mg a day;     CMP on f/u.    Essential hypertension; needs better control; increase amlodipine to 5 mg q am; 2.5 mg q pm; Maintain < 2 Gm Na a day diet,     and monitor BP at home; keep a log.  Continue atenolol, indapamide, and lisinopril.. Light exercise weight reduction will help    ROBBY (obstructive sleep apnea); intolerant of CPAP.  Weight reduction will help    S/P coronary artery stent placement of LAD; keep f/u w Dr Ward as directed. Cont atenolol, and lisinopril, plavix/ASA.  Also atorvastatin    Type 2 diabetes mellitus without complication, without long-term current use of insulin; Maintain a low carb diet; light exercise and weight     reduction      monitor CBG's at home; keep a log for review.  Continue metformin    Carotid artery plaque, unspecified laterality; need copy of ultrasound from cardiology    Valvular heart disease; echocardiogram following per cardiology    Hypothyroidism, unspecified type; borderline TSH w nl freeT3,4; continue to follow thyroid function test    Non morbid obesity due to excess calories; Caloric restriction w regular exercise and weight reduction.    Methylenetetrahydrofolate reductase (MTHFR) gene mutation; homocystine slightly elevated at 13; will add folate 400 mcg a day.     foltrin added for anemia as well    Small scaly lesion noted right facial cheek neck: To see dermatologist of his choice for evaluation; noted for 4 months now

## 2017-05-18 NOTE — MR AVS SNAPSHOT
[facility]  [address]  [place] [address]  Phone: [phone]  Fax: [phone]                  Jadiel Rossi   2017 3:00 PM   Office Visit    Description:  Male : 1945   Provider:  Eliud Chandler MD   Department:  [facility]           Reason for Visit     Follow up labs           Diagnoses this Visit        Comments    Mixed hyperlipidemia    -  Primary     Essential hypertension         ROBBY (obstructive sleep apnea)         S/P coronary artery stent placement         Type 2 diabetes mellitus without complication, without long-term current use of insulin         Carotid artery plaque, unspecified laterality         Valvular heart disease         Hypothyroidism, unspecified type         Non morbid obesity due to excess calories         Methylenetetrahydrofolate reductase (MTHFR) gene mutation         Anemia, unspecified type         Macrocytosis                To Do List           Future Appointments        Provider Department Dept Phone    2017 1:00 PM Eliud Chandler MD [facility] [phone]      Goals (5 Years of Data)     None      Follow-Up and Disposition     Return for Reassessment and go over her labs.       These Medications        Disp Refills Start End    amlodipine (NORVASC) 2.5 MG tablet 30 tablet 2 2017     1 po q 4-6 pm.    Pharmacy: [facility] [address] Ph #: [phone]       atorvastatin (LIPITOR) 40 MG tablet 30 tablet 3 2017    Take 1 tablet (40 mg total) by mouth once daily. - Oral    Pharmacy: [address] Ph #: [phone]       iron fum-B12-IF-C-folic acid (FOLTRIN) 110-0.5 mg capsule 30 capsule 2 2017     Take 1 capsule by mouth before breakfast. - Oral    Pharmacy: [facility] [place] [address] -  [place] - [address] [address] AT [place] [address] & [facility] Ph #: [phone]       INV folate 400 MCG tablet 30 tablet 2 2017     Take 1 tablet (400 mcg total) by mouth once daily. - Oral    Pharmacy: [facility] - [place], [place]  [address] [facility] Ph #: [phone]         [facility]     [facility] Nurse Care Line -  Assistance  Unless otherwise directed by your provider, please contact [facility], our nurse care line that is available for  assistance.     Registered nurses in the [facility] provide: appointment scheduling, clinical advisement, health education, and other advisory services.  Call: [phone] (toll free)               Medications           Message regarding Medications     Verify the changes and/or additions to your medication regime listed below are the same as discussed with your clinician today.  If any of these changes or additions are incorrect, please notify your healthcare provider.        START taking these NEW medications        Refills    amlodipine (NORVASC) 2.5 MG tablet 2    Si po q 4-6 pm.    Class: Normal    atorvastatin (LIPITOR) 40 MG tablet 3    Sig: Take 1 tablet (40 mg total) by mouth once daily.    Class: Normal    Route: Oral    iron fum-B12-IF-C-folic acid (FOLTRIN) 110-0.5 mg capsule 2    Sig: Take 1 capsule by mouth before breakfast.    Class: Normal    Route: Oral    INV folate 400 MCG tablet 2    Sig: Take 1 tablet (400 mcg total) by mouth once daily.    Class: Outpatient Research    Route: Oral      STOP taking these medications     dicloxacillin (DYNAPEN) 500 MG capsule TK ONE C PO Q 8 H AFTER MEALS FOR 10 DAYS    doxycycline (VIBRAMYCIN) 100 MG Cap Take 1 capsule (100 mg total) by mouth every 12 (twelve) hours. Take for 10 days pc.    oxycodone-acetaminophen (PERCOCET) 5-325 mg per tablet TK 1 T PO Q 8 H PRN    sulfamethoxazole-trimethoprim 800-160mg (BACTRIM DS) 800-160 mg Tab  Take 1 tablet by mouth 2 (two) times daily. Take for 10 days pc.           Verify that the below list of medications is an accurate representation of the medications you are currently taking.  If none reported, the list may be blank. If incorrect, please contact your healthcare provider. Carry this list with you in case of emergency.           Current Medications     albuterol (VENTOLIN HFA) 90 mcg/actuation inhaler Inhale 2 puffs into the lungs every 6 (six) hours as needed for Wheezing. Rescue    amlodipine (NORVASC) 5 MG tablet Take 1 tablet (5 mg total) by mouth once daily.    aspirin (ECOTRIN) 81 MG EC tablet Take 81 mg by mouth once daily.    atenolol (TENORMIN) 50 MG tablet Take 1 tablet (50 mg total) by mouth 2 (two) times daily.    blood sugar diagnostic (BLOOD GLUCOSE TEST) Strp by Misc.(Non-Drug; Combo Route) route.    cinnamon bark (CINNAMON) 500 mg capsule Take 500 mg by mouth once daily.    DOCOSAHEXANOIC ACID/EPA (FISH OIL ORAL) Take 300 mg by mouth 2 (two) times daily.     indapamide (LOZOL) 1.25 MG Tab Take 1 tablet (1.25 mg total) by mouth once daily.    lancets (EASY TOUCH LANCETS) 28 gauge Misc by Misc.(Non-Drug; Combo Route) route.    lisinopril (PRINIVIL,ZESTRIL) 20 MG tablet Take 20 mg by mouth 2 (two) times daily.    metformin (GLUCOPHAGE) 500 MG tablet TAKE 1 TABLET BY MOUTH TWICE DAILY    multivitamin (ONE DAILY MULTIVITAMIN) per tablet Take 1 tablet by mouth once daily.    TURMERIC ROOT EXTRACT ORAL Take 500 mg by mouth once daily.    amlodipine (NORVASC) 2.5 MG tablet 1 po q 4-6 pm.    atorvastatin (LIPITOR) 40 MG tablet Take 1 tablet (40 mg total) by mouth once daily.    clopidogrel (PLAVIX) 75 mg tablet TK 1 T PO D    INV folate 400 MCG tablet Take 1 tablet (400 mcg total) by mouth once daily.    iron fum-B12-IF-C-folic acid (FOLTRIN) 110-0.5 mg capsule Take 1 capsule by mouth before breakfast.    nitroGLYCERIN (NITROSTAT) 0.4 MG SL tablet DISSOLVE ONE T SUBLINGUALLY Q 5 MINUTES X 3  "DOSES PRN FOR CHEST PAIN           Clinical Reference Information           Your Vitals Were     BP Pulse Temp Height Weight SpO2    138/85 (BP Location: Right arm, Patient Position: Sitting, BP Method: Manual) 58 97.9 °F (36.6 °C) (Oral) 5' 10" (1.778 m) 96.7 kg (213 lb 4.7 oz) 94%    BMI                30.6 kg/m2          Blood Pressure          Most Recent Value    BP  138/85      Allergies as of 5/18/2017     No Known Allergies      Immunizations Administered on Date of Encounter - 5/18/2017     None      Orders Placed During Today's Visit     Future Labs/Procedures Expected by Expires    CBC auto differential  5/18/2017 7/17/2018    Comprehensive metabolic panel  5/18/2017 7/17/2018    Ferritin  5/18/2017 7/17/2018    Iron and TIBC  5/18/2017 7/17/2018    Occult blood x 1, stool  5/18/2017 5/18/2018    Occult blood x 1, stool  5/18/2017 5/18/2018    Occult blood x 1, stool  5/18/2017 5/18/2018    Reticulocytes  5/18/2017 7/17/2018      [facility] Sign-Up     Activating your [facility] account is as easy as 1-2-3!     1) Visit [email or web address], select Sign Up Now, enter this activation code and your date of birth, then select Next.  [email or web address]  Expires: 7/2/2017  4:37 PM      2) Create a username and password to use when you visit [facility] in the future and select a security question in case you lose your password and select Next.    3) Enter your e-mail address and click Sign Up!    Additional Information  If you have questions, please e-mail [email or web address] or call [phone] to talk to our [facility] staff. Remember, [facility] is NOT to be used for urgent needs. For medical emergencies, dial 911.         Instructions    [facility] for his labs; monitor CBG/BP/P; keep a log. Add amlodipine 2.5 mg q pm.       Language Assistance Services     ATTENTION: Language assistance services are available, free of charge. Please call [phone].      ATENCIÓN: Si habla jonnyañol, tiene a mcdaniels disposición servicios " carmeloos de asistencia lingüística. Curt esqueda [phone].     MAMTA Ý: N?u b?n nói Ti?ng Vi?t, có các d?ch v? h? tr? ngôn ng? mi?n phí dành cho b?n. G?i s? [phone].         [facility] complies with applicable Federal civil rights laws and does not discriminate on the basis of race, color, national origin, age, disability, or sex.

## 2017-05-19 DIAGNOSIS — E11.9 TYPE 2 DIABETES MELLITUS WITHOUT COMPLICATION: ICD-10-CM

## 2017-05-19 RX ORDER — FOLIC ACID 0.4 MG
400 TABLET ORAL DAILY
Qty: 30 TABLET | Refills: 1 | COMMUNITY
Start: 2017-05-19 | End: 2018-10-03

## 2017-05-20 ENCOUNTER — TELEPHONE (OUTPATIENT)
Dept: FAMILY MEDICINE | Facility: CLINIC | Age: 72
End: 2017-05-20

## 2017-05-21 NOTE — TELEPHONE ENCOUNTER
Remind patient to see dermatologist of his choice for his right facial cheek lesion discussed in the office, present for 4 months now.  Please also remind  him to schedule follow-up appointment in 2 months with fasting lab prior

## 2017-05-22 NOTE — TELEPHONE ENCOUNTER
Called pt regarding derm needed for rt facial lesion. Pt did need derm recommendation. I recommended Dr. Rajput and pt said he will call to set up appt on his own. Pt verbalized understanding.

## 2017-05-25 NOTE — TELEPHONE ENCOUNTER
Spoke with pt and he is a little confused on how to take his medications; he said that Dr. Ward has changed something's as well and he wants to know what and how he is supposed to be taking. Please advise.

## 2017-05-28 NOTE — TELEPHONE ENCOUNTER
Please advise patient to schedule appointment to go over all his medications to help clear this up.  Bring in all his medications as well for us to check the last and any changes that Dr. Ward is made also so we can review all us with him

## 2017-05-30 NOTE — TELEPHONE ENCOUNTER
----- Message from Leonila Tran sent at 5/30/2017  3:02 PM CDT -----  Contact: pt  Returning call, Aliyah  Call back on # [phone]  thanks

## 2017-06-01 DIAGNOSIS — I10 ESSENTIAL HYPERTENSION: ICD-10-CM

## 2017-06-01 RX ORDER — INDAPAMIDE 1.25 MG/1
TABLET ORAL
Qty: 30 TABLET | Refills: 2 | Status: SHIPPED | OUTPATIENT
Start: 2017-06-01 | End: 2017-08-22 | Stop reason: SDUPTHER

## 2017-06-02 ENCOUNTER — OFFICE VISIT (OUTPATIENT)
Dept: FAMILY MEDICINE | Facility: CLINIC | Age: 72
End: 2017-06-02
Payer: MEDICARE

## 2017-06-02 VITALS
BODY MASS INDEX: 30.49 KG/M2 | DIASTOLIC BLOOD PRESSURE: 80 MMHG | HEART RATE: 63 BPM | SYSTOLIC BLOOD PRESSURE: 136 MMHG | OXYGEN SATURATION: 95 % | WEIGHT: 212.94 LBS | TEMPERATURE: 98 F | HEIGHT: 70 IN

## 2017-06-02 DIAGNOSIS — E78.2 MIXED HYPERLIPIDEMIA: ICD-10-CM

## 2017-06-02 DIAGNOSIS — E66.09 NON MORBID OBESITY DUE TO EXCESS CALORIES: ICD-10-CM

## 2017-06-02 DIAGNOSIS — Z15.89 METHYLENETETRAHYDROFOLATE REDUCTASE (MTHFR) GENE MUTATION: ICD-10-CM

## 2017-06-02 DIAGNOSIS — G47.33 OSA (OBSTRUCTIVE SLEEP APNEA): ICD-10-CM

## 2017-06-02 DIAGNOSIS — I10 ESSENTIAL HYPERTENSION: Primary | ICD-10-CM

## 2017-06-02 DIAGNOSIS — E11.9 TYPE 2 DIABETES MELLITUS WITHOUT COMPLICATION, WITHOUT LONG-TERM CURRENT USE OF INSULIN: ICD-10-CM

## 2017-06-02 DIAGNOSIS — I38 VALVULAR HEART DISEASE: ICD-10-CM

## 2017-06-02 DIAGNOSIS — I65.29 CAROTID ARTERY PLAQUE, UNSPECIFIED LATERALITY: ICD-10-CM

## 2017-06-02 PROCEDURE — 1126F AMNT PAIN NOTED NONE PRSNT: CPT | Mod: S$GLB,,, | Performed by: INTERNAL MEDICINE

## 2017-06-02 PROCEDURE — 99999 PR PBB SHADOW E&M-EST. PATIENT-LVL V: CPT | Mod: PBBFAC,,, | Performed by: INTERNAL MEDICINE

## 2017-06-02 PROCEDURE — 4010F ACE/ARB THERAPY RXD/TAKEN: CPT | Mod: S$GLB,,, | Performed by: INTERNAL MEDICINE

## 2017-06-02 PROCEDURE — 1159F MED LIST DOCD IN RCRD: CPT | Mod: S$GLB,,, | Performed by: INTERNAL MEDICINE

## 2017-06-02 PROCEDURE — 3044F HG A1C LEVEL LT 7.0%: CPT | Mod: S$GLB,,, | Performed by: INTERNAL MEDICINE

## 2017-06-02 PROCEDURE — 99213 OFFICE O/P EST LOW 20 MIN: CPT | Mod: S$GLB,,, | Performed by: INTERNAL MEDICINE

## 2017-06-02 RX ORDER — AMLODIPINE BESYLATE 5 MG/1
5 TABLET ORAL 2 TIMES DAILY
Qty: 60 TABLET | Refills: 2 | Status: SHIPPED | OUTPATIENT
Start: 2017-06-02 | End: 2018-11-18

## 2017-06-02 RX ORDER — PNV NO.95/FERROUS FUM/FOLIC AC 28MG-0.8MG
1 TABLET ORAL 2 TIMES DAILY
Qty: 60 CAPSULE | Refills: 2 | Status: SHIPPED | OUTPATIENT
Start: 2017-06-02 | End: 2019-02-14

## 2017-06-02 NOTE — PROGRESS NOTES
Subjective:       Patient ID: Jadiel Rossi is a 71 y.o. male.    Chief Complaint: Medication Problem (to discuss medications )    HPI  in today to go over his medications which were reviewed at length and discussed.  He has a history of hypo-thyroidism is not on any medications yet.  Obstructive sleep apnea but intolerant for CPAP.  He is a history of mixed hyperlipidemia is on a low-fat high-fiber diet and tolerates his atorvastatin fine.  Has a history of hypertension his blood pressures been averaging at home 155/60 but here he is 136/80; he's been asked to bring his cuff in for the today's visit for comparison and today his home cuff shows 170/75 for his blood pressure reading.  His been requested to buy a new cuff.  With regards to his diabetes mellitus type 2 his CBGs have been averaging around 110 and he does watch his carbohydrates.  With regards to his obesity is trying to lose weigh he exercises 2 times per week on the treadmill 20-30 minutes.  In December 2016 of note he had a recent carotid ultrasound showed slight plaquing on the right.  His dermatology appointment pending with Dr. Cobb 6/7/17.     Review of Systems   Constitutional: Negative for appetite change and unexpected weight change.   HENT: Negative for congestion, postnasal drip and sinus pressure.    Eyes: Negative for discharge and itching.   Respiratory: Negative for cough, chest tightness, shortness of breath and wheezing.    Cardiovascular: Negative for chest pain, palpitations and leg swelling.   Gastrointestinal: Negative for abdominal pain, blood in stool, constipation, diarrhea, nausea and vomiting.   Endocrine: Negative for polydipsia, polyphagia and polyuria.   Genitourinary: Negative for dysuria, hematuria and urgency.   Musculoskeletal: Negative for arthralgias and myalgias.   Skin: Negative for rash.   Allergic/Immunologic: Positive for environmental allergies. Negative for food allergies and immunocompromised state.  "  Neurological: Negative for tremors, seizures and headaches.   Hematological: Negative for adenopathy. Does not bruise/bleed easily.   Psychiatric/Behavioral:        Denies anxiety or depression.       Objective:      Vitals:    06/02/17 1001   BP: 136/80   BP Location: Right arm   Patient Position: Sitting   BP Method: Manual   Pulse: 63   Temp: 98.3 °F (36.8 °C)   TempSrc: Oral   SpO2: 95%   Weight: 96.6 kg (212 lb 15.4 oz)   Height: 5' 10" (1.778 m)     Body mass index is 30.56 kg/m².    Physical Exam   Constitutional: He is oriented to person, place, and time. He appears well-developed and well-nourished.   HENT:   Head: Normocephalic and atraumatic.   Eyes: EOM are normal.   Neck: Normal range of motion. Neck supple. No thyromegaly present.   No carotid bruit heard.   Cardiovascular: Normal rate and regular rhythm.  Exam reveals no gallop.    No murmur heard.  Systolic ejection murmurs 3-4/6 in the aortic area; 3/6 in the pulmonic; 3/6 left lower sternal border; and 2/6 in the apex.   Pulmonary/Chest: Effort normal and breath sounds normal. No respiratory distress. He has no wheezes. He has no rales.   Abdominal: Soft. Bowel sounds are normal. He exhibits no distension. There is no tenderness. There is no rebound and no guarding.   Musculoskeletal: Normal range of motion. He exhibits no edema.   Lymphadenopathy:     He has no cervical adenopathy.   Neurological: He is alert and oriented to person, place, and time.   Moves all 4 extremities fine.   Skin: No rash noted.   Psychiatric: He has a normal mood and affect. His behavior is normal. Thought content normal.   Vitals reviewed.      Assessment:       1. Essential hypertension    2. Type 2 diabetes mellitus without complication, without long-term current use of insulin    3. ROBBY (obstructive sleep apnea)    4. Mixed hyperlipidemia    5. Valvular heart disease    6. Carotid artery plaque, unspecified laterality    7. Non morbid obesity due to excess calories  "   8. Methylenetetrahydrofolate reductase (MTHFR) gene mutation        Plan:       Essential hypertension. Maintain < 2 Gm Na a day diet, and monitor BP at home; keep a log.  Due to inaccuracy patient's been asked to get a new pressure cuff for home use; amlodipine dosing should be 5 mg by mouth twice a day; continue atenolol, indapamide, and lisinopril at present doses    Type 2 diabetes mellitus without complication, without long-term current use of insulin. Maintain a low carb diet; monitor CBG's at home; keep a log for review.  Continue his metformin and attempts at weight reduction    ROBBY (obstructive sleep apnea); intolerant to CPAP. Weight reduction needed.    Mixed hyperlipidemia. Maintain low fat high fiber diet, exercise regularly.  Continue atorvastatin and Krill/omega-3, and fish oil    Valvular heart disease; followed by his cardiologist Dr. Ward with an echo due the end of July    Carotid artery plaque, unspecified laterality; on Plavix and 81 mg aspirin    Non morbid obesity due to excess calories; caloric  restriction with regular exercise and weight reduction attempts    Methylenetetrahydrofolate reductase (MTHFR) gene mutation; continue 400 µg of folic acid and foltrin daily.    Other orders  -     amlodipine (NORVASC) 5 MG tablet; Take 1 tablet (5 mg total) by mouth 2 (two) times daily.  Dispense: 60 tablet; Refill: 2  -     krill-omega-3-dha-epa-lipids [id number] mg Cap; Take 1 capsule by mouth 2 (two) times daily.  Dispense: 60 capsule; Refill: 2

## 2017-06-02 NOTE — PATIENT INSTRUCTIONS
Recommend obtaining new BP cuff; monitor his BP/pulse and CBG's at home; keep a log. Exercise w weight reduction. Labs fasting few days before f/u in July 18 th.

## 2017-07-06 ENCOUNTER — PATIENT OUTREACH (OUTPATIENT)
Dept: ADMINISTRATIVE | Facility: HOSPITAL | Age: 72
End: 2017-07-06

## 2017-07-06 NOTE — LETTER
July 6, 2017    Jadiel Flo  [address]  [place] [address]             [facility]  [address]  [place] [address]  Phone: [phone] Dear Mr. Rossi:    [facility] is committed to your overall health.  To help you get the most out of each of your visits, we will review your information to make sure you are up to date on all of your recommended tests and/or procedures.      Dr. Eliud Chandler has found that you may be due for a urine test for your kidneys, your annual diabetic eye and foot exams, hepatitis C screening, colon cancer screening, and possibly some immunizations (tetanus, shingles, and pneumonia).     Medicare does not cover all immunizations to be given in the clinic.  Check your benefits to ensure that you do not need to receive your immunizations at the pharmacy.    If you have had any of the above done at another facility, please bring the records or information with you so that your record at [facility] will be complete.  If you would like to schedule any of these, please contact me.    If you are currently taking medication, please bring it with you to your appointment for review.    Also, if you have any type of Advanced Directives, please bring them with you to your office visit so we may scan them into your chart.    If you have any questions or concerns, please don't hesitate to call.    Thank you for letting us care for you,  Valerie Buckner LPN Clinical Care Coordinator  [facility] [place] [facility] [place]  [phone]

## 2017-07-11 ENCOUNTER — TELEPHONE (OUTPATIENT)
Dept: FAMILY MEDICINE | Facility: CLINIC | Age: 72
End: 2017-07-11

## 2017-07-11 NOTE — TELEPHONE ENCOUNTER
Explained to pt that prescriptions sent to pharmacy at last appt. The medication that he picked up 7/3/2017/amlodipine 2.5mg was changed 6/2/2017 and received by pharmacy. Should have be 5mg, but the pharmacy did not fill the correct prescription. Advised pt that these meds refills were sent the day of his appt. Pt states that he will check with pharmacy tomorrow.

## 2017-07-11 NOTE — TELEPHONE ENCOUNTER
----- Message from RT Tea sent at 7/11/2017  1:28 PM CDT -----  Contact: pt    pt , requesting a call back from Nurse Ly concerning his medication prescriptions, thanks.

## 2017-07-25 RX ORDER — ATENOLOL 50 MG/1
TABLET ORAL
Qty: 60 TABLET | Refills: 2 | Status: SHIPPED | OUTPATIENT
Start: 2017-07-25 | End: 2017-07-27 | Stop reason: RX

## 2017-07-27 ENCOUNTER — TELEPHONE (OUTPATIENT)
Dept: FAMILY MEDICINE | Facility: CLINIC | Age: 72
End: 2017-07-27

## 2017-07-27 RX ORDER — METOPROLOL SUCCINATE 50 MG/1
50 TABLET, EXTENDED RELEASE ORAL DAILY
Qty: 90 TABLET | Refills: 1 | Status: SHIPPED | OUTPATIENT
Start: 2017-07-27 | End: 2018-01-13 | Stop reason: SDUPTHER

## 2017-07-27 NOTE — TELEPHONE ENCOUNTER
----- Message from Rima Jiménez sent at 7/27/2017 11:47 AM CDT -----  Contact: [facility] is out Rx atenolol (TENORMIN) 50 MG tablet 60    [employer] shortage. He needs them to be sent something else. Please call [phone] thanks!

## 2017-07-27 NOTE — TELEPHONE ENCOUNTER
Spoke with [facility] pharmacy and they said that Atenolol is on back order till the end of Aug and they want to know what would you like to do? They were informed that provider will not be back in clinic till Monday and they said that's fine. Please advise.

## 2017-07-27 NOTE — TELEPHONE ENCOUNTER
----- Message from Sonam Wahl sent at 7/26/2017  4:34 PM CDT -----  Contact:  say// [phone]    Calling to  Speak to  The   Nurse  About  Atenolol , is  On  Back  Order // please  Call for  details

## 2017-08-01 RX ORDER — METFORMIN HYDROCHLORIDE 500 MG/1
TABLET ORAL
Qty: 60 TABLET | Refills: 2 | Status: SHIPPED | OUTPATIENT
Start: 2017-08-01 | End: 2017-11-14 | Stop reason: SDUPTHER

## 2017-08-09 ENCOUNTER — PATIENT OUTREACH (OUTPATIENT)
Dept: ADMINISTRATIVE | Facility: HOSPITAL | Age: 72
End: 2017-08-09

## 2017-08-18 DIAGNOSIS — Z12.11 COLON CANCER SCREENING: ICD-10-CM

## 2017-08-22 DIAGNOSIS — I10 ESSENTIAL HYPERTENSION: ICD-10-CM

## 2017-08-22 RX ORDER — INDAPAMIDE 1.25 MG/1
TABLET ORAL
Qty: 30 TABLET | Refills: 2 | Status: SHIPPED | OUTPATIENT
Start: 2017-08-22 | End: 2017-10-22 | Stop reason: SDUPTHER

## 2017-09-05 RX ORDER — ALBUTEROL SULFATE 90 UG/1
AEROSOL, METERED RESPIRATORY (INHALATION)
Qty: 18 G | Refills: 0 | Status: SHIPPED | OUTPATIENT
Start: 2017-09-05 | End: 2017-10-24 | Stop reason: SDUPTHER

## 2017-09-05 RX ORDER — FERROUS FUM/VIT C/B12-IF/FOLIC 110-0.5MG
1 CAPSULE ORAL
Qty: 30 CAPSULE | Refills: 0 | Status: SHIPPED | OUTPATIENT
Start: 2017-09-05 | End: 2018-09-10

## 2017-09-13 ENCOUNTER — PATIENT OUTREACH (OUTPATIENT)
Dept: ADMINISTRATIVE | Facility: HOSPITAL | Age: 72
End: 2017-09-13

## 2017-09-13 NOTE — LETTER
September 19, 2017    Jadiel Flo  [address]  [place] [address]             [facility]  [address]  [facility] [address]  Phone: [phone] Dear Mr. Rossi:    We have tried to reach you by mychart unsuccessfully.    [facility] is committed to your overall health.  To help you get the most out of each of your visits, we will review your information to make sure you are up to date on all of your recommended tests and/or procedures.       Dr. Eliud Chandler has found that you may be due for a urine test for your kidneys, your annual diabetic eye and foot exams, hepatitis C screening, colon cancer screening, and possibly some immunizations (tetanus, shingles, pneumonia, and flu).     Medicare does not cover all immunizations to be given in the clinic.  Check your benefits to ensure that you do not need to receive your immunizations at the pharmacy.     If you have had any of the above done at another facility, please bring the records or information with you so that your record at [facility] will be complete.  If you would like to schedule any of these, please contact me.     If you are currently taking medication, please bring it with you to your appointment for review.     Also, if you have any type of Advanced Directives, please bring them with you to your office visit so we may scan them into your chart.     If you have any questions or concerns, please don't hesitate to call.    Thank you for letting us care for you,  Valerie Buckner LPN Clinical Care Coordinator  [facility] [place]  [phone]

## 2017-09-22 DIAGNOSIS — Z11.59 NEED FOR HEPATITIS C SCREENING TEST: ICD-10-CM

## 2017-09-30 LAB
ALBUMIN SERPL-MCNC: 4.6 G/DL (ref 3.5–4.8)
ALBUMIN/GLOB SERPL: 1.6 {RATIO} (ref 1.2–2.2)
ALP SERPL-CCNC: 66 IU/L (ref 39–117)
ALT SERPL-CCNC: 39 IU/L (ref 0–44)
AST SERPL-CCNC: 41 IU/L (ref 0–40)
BASOPHILS # BLD AUTO: 0 X10E3/UL (ref 0–0.2)
BASOPHILS NFR BLD AUTO: 0 %
BILIRUB SERPL-MCNC: 0.5 MG/DL (ref 0–1.2)
BUN SERPL-MCNC: 20 MG/DL (ref 8–27)
BUN/CREAT SERPL: 17 (ref 10–24)
CALCIUM SERPL-MCNC: 9.9 MG/DL (ref 8.6–10.2)
CHLORIDE SERPL-SCNC: 96 MMOL/L (ref 96–106)
CO2 SERPL-SCNC: 25 MMOL/L (ref 18–29)
CREAT SERPL-MCNC: 1.19 MG/DL (ref 0.76–1.27)
EOSINOPHIL # BLD AUTO: 0.5 X10E3/UL (ref 0–0.4)
EOSINOPHIL NFR BLD AUTO: 5 %
ERYTHROCYTE [DISTWIDTH] IN BLOOD BY AUTOMATED COUNT: 13.9 % (ref 12.3–15.4)
FERRITIN SERPL-MCNC: 648 NG/ML (ref 30–400)
GLOBULIN SER CALC-MCNC: 2.8 G/DL (ref 1.5–4.5)
GLUCOSE SERPL-MCNC: 96 MG/DL (ref 65–99)
HCT VFR BLD AUTO: 36.8 % (ref 37.5–51)
HGB BLD-MCNC: 12.3 G/DL (ref 12.6–17.7)
IMM GRANULOCYTES # BLD: 0 X10E3/UL (ref 0–0.1)
IMM GRANULOCYTES NFR BLD: 0 %
IRON SATN MFR SERPL: 17 % (ref 15–55)
IRON SERPL-MCNC: 51 UG/DL (ref 38–169)
LYMPHOCYTES # BLD AUTO: 2 X10E3/UL (ref 0.7–3.1)
LYMPHOCYTES NFR BLD AUTO: 22 %
MCH RBC QN AUTO: 30.4 PG (ref 26.6–33)
MCHC RBC AUTO-ENTMCNC: 33.4 G/DL (ref 31.5–35.7)
MCV RBC AUTO: 91 FL (ref 79–97)
MONOCYTES # BLD AUTO: 0.6 X10E3/UL (ref 0.1–0.9)
MONOCYTES NFR BLD AUTO: 6 %
NEUTROPHILS # BLD AUTO: 6.2 X10E3/UL (ref 1.4–7)
NEUTROPHILS NFR BLD AUTO: 67 %
PLATELET # BLD AUTO: 223 X10E3/UL (ref 150–379)
POTASSIUM SERPL-SCNC: 4.4 MMOL/L (ref 3.5–5.2)
PROT SERPL-MCNC: 7.4 G/DL (ref 6–8.5)
RBC # BLD AUTO: 4.04 X10E6/UL (ref 4.14–5.8)
RETICS/RBC NFR AUTO: 1.1 % (ref 0.6–2.6)
SODIUM SERPL-SCNC: 140 MMOL/L (ref 134–144)
TIBC SERPL-MCNC: 295 UG/DL (ref 250–450)
UIBC SERPL-MCNC: 244 UG/DL (ref 111–343)
WBC # BLD AUTO: 9.4 X10E3/UL (ref 3.4–10.8)

## 2017-10-01 DIAGNOSIS — Z95.5 S/P CORONARY ARTERY STENT PLACEMENT: ICD-10-CM

## 2017-10-01 DIAGNOSIS — E78.2 MIXED HYPERLIPIDEMIA: ICD-10-CM

## 2017-10-01 RX ORDER — ATORVASTATIN CALCIUM 40 MG/1
TABLET, FILM COATED ORAL
Qty: 30 TABLET | Refills: 0 | Status: SHIPPED | OUTPATIENT
Start: 2017-10-01 | End: 2017-10-31 | Stop reason: SDUPTHER

## 2017-10-05 ENCOUNTER — TELEPHONE (OUTPATIENT)
Dept: FAMILY MEDICINE | Facility: CLINIC | Age: 72
End: 2017-10-05

## 2017-10-10 ENCOUNTER — OFFICE VISIT (OUTPATIENT)
Dept: FAMILY MEDICINE | Facility: CLINIC | Age: 72
End: 2017-10-10
Payer: MEDICARE

## 2017-10-10 VITALS
HEIGHT: 70 IN | DIASTOLIC BLOOD PRESSURE: 85 MMHG | WEIGHT: 211.63 LBS | OXYGEN SATURATION: 97 % | BODY MASS INDEX: 30.3 KG/M2 | TEMPERATURE: 99 F | HEART RATE: 82 BPM | SYSTOLIC BLOOD PRESSURE: 139 MMHG

## 2017-10-10 DIAGNOSIS — E78.2 MIXED HYPERLIPIDEMIA: Primary | ICD-10-CM

## 2017-10-10 DIAGNOSIS — I10 ESSENTIAL HYPERTENSION: ICD-10-CM

## 2017-10-10 DIAGNOSIS — D63.8 ANEMIA, CHRONIC DISEASE: ICD-10-CM

## 2017-10-10 DIAGNOSIS — E11.9 TYPE 2 DIABETES MELLITUS WITHOUT COMPLICATION, WITHOUT LONG-TERM CURRENT USE OF INSULIN: ICD-10-CM

## 2017-10-10 DIAGNOSIS — G47.33 OSA (OBSTRUCTIVE SLEEP APNEA): ICD-10-CM

## 2017-10-10 DIAGNOSIS — E03.9 HYPOTHYROIDISM, UNSPECIFIED TYPE: ICD-10-CM

## 2017-10-10 DIAGNOSIS — E66.09 CLASS 1 OBESITY DUE TO EXCESS CALORIES WITH SERIOUS COMORBIDITY AND BODY MASS INDEX (BMI) OF 30.0 TO 30.9 IN ADULT: ICD-10-CM

## 2017-10-10 DIAGNOSIS — Z12.5 PROSTATE CANCER SCREENING: ICD-10-CM

## 2017-10-10 PROCEDURE — 99999 PR PBB SHADOW E&M-EST. PATIENT-LVL IV: CPT | Mod: PBBFAC,,, | Performed by: INTERNAL MEDICINE

## 2017-10-10 PROCEDURE — 99214 OFFICE O/P EST MOD 30 MIN: CPT | Mod: S$GLB,,, | Performed by: INTERNAL MEDICINE

## 2017-10-10 NOTE — PROGRESS NOTES
"Subjective:       Patient ID: Jadiel Rossi is a 71 y.o. male.    Chief Complaint: here to go over his labs     HPI  Overall he's been doing fine; here to go over his labs. ROBBY but couldn't tolerate in past; doesn't want CPAP at present; can f/u w Dr Ayers if he desires.HLP: on low fat high fiber diet; tolerates atorvastatin fine. HTN: running at home avr 141-150's /67-70; watches his salt intake. DM2:  110-114 CBG avr; watches his diet. Exercises 2x a week.  BMI 30.37    Review of Systems   Constitutional: Negative for appetite change and unexpected weight change.   HENT: Negative for congestion, postnasal drip and sinus pressure.    Eyes: Negative for discharge and itching.   Respiratory: Negative for cough, chest tightness, shortness of breath and wheezing.    Cardiovascular: Negative for chest pain, palpitations and leg swelling.   Gastrointestinal: Negative for abdominal pain, blood in stool, constipation, diarrhea, nausea and vomiting.   Endocrine: Negative for polydipsia, polyphagia and polyuria.   Genitourinary: Negative for dysuria and hematuria.   Musculoskeletal: Negative for arthralgias and myalgias.   Skin: Negative for rash.   Allergic/Immunologic: Negative for environmental allergies and food allergies.   Neurological: Negative for tremors, seizures and headaches.   Hematological: Negative for adenopathy. Does not bruise/bleed easily.   Psychiatric/Behavioral:        Denies anxiety or depression.       Objective:      Vitals:    10/10/17 1415   BP: 139/85   BP Location: Left arm   Patient Position: Sitting   BP Method: Medium (Manual)   Pulse: 82   Temp: 99.2 °F (37.3 °C)   TempSrc: Oral   SpO2: 97%   Weight: 96 kg (211 lb 10.3 oz)   Height: 5' 10" (1.778 m)     Body mass index is 30.37 kg/m².    Physical Exam   Constitutional: He is oriented to person, place, and time. He appears well-developed and well-nourished.   HENT:   Head: Normocephalic and atraumatic.   Eyes: EOM are normal.   Neck: Normal " range of motion. Neck supple. No thyromegaly present.   Cardiovascular: Normal rate, regular rhythm and normal heart sounds.  Exam reveals no gallop.    No murmur heard.  Pulmonary/Chest: Effort normal and breath sounds normal. No respiratory distress. He has no wheezes. He has no rales.   Abdominal: Soft. Bowel sounds are normal. He exhibits no distension. There is no tenderness. There is no rebound and no guarding.   Musculoskeletal: Normal range of motion. He exhibits no edema.   Lymphadenopathy:     He has no cervical adenopathy.   Neurological: He is alert and oriented to person, place, and time.   Moves all 4 extremities fine.   Skin: No rash noted.   Psychiatric: He has a normal mood and affect. His behavior is normal. Thought content normal.   Vitals reviewed.      Assessment:       1. Mixed hyperlipidemia    2. Essential hypertension    3. ROBBY (obstructive sleep apnea)    4. Type 2 diabetes mellitus without complication, without long-term current use of insulin    5. Class 1 obesity due to excess calories with serious comorbidity and body mass index (BMI) of 30.0 to 30.9 in adult    6. Prostate cancer screening    7. Anemia, chronic disease    8. Hypothyroidism, unspecified type        Plan:       Mixed hyperlipidemia. Maintain low fat high fiber diet, exercise regularly. Cont atorvastatin  -     Lipid panel; Future; Expected date: 10/10/2017  -     Comprehensive metabolic panel; Future; Expected date: 10/10/2017  -     TSH; Future; Expected date: 10/10/2017  -     T4, free; Future; Expected date: 10/10/2017  -     T3, free; Future; Expected date: 10/10/2017    Essential hypertension. Maintain < 2 Gm Na a day diet, and monitor BP at home; keep a log. On lisinopril/metoprolol.  -     Magnesium; Future; Expected date: 10/10/2017  -     Urinalysis; Future; Expected date: 10/10/2017    ROBBY (obstructive sleep apnea); to see dr mckeon about CPAP when he's ready.    Type 2 diabetes mellitus without complication,  without long-term current use of insulin; on metformin.  -     Hemoglobin A1c; Future; Expected date: 10/10/2017    Class 1 obesity due to excess calories with serious comorbidity and body mass index (BMI) of 30.0 to 30.9 in adult;              Caloric restriction w regular exercise and weight reduction.    Prostate cancer screening  -     PSA, Screening; Future; Expected date: 10/10/2017    Anemia, chronic disease  -     CBC auto differential; Future; Expected date: 10/10/2017    Hypothyroidism, unspecified type; not on supplements yet-     TSH; Future; Expected date: 10/10/2017  -     T4, free; Future; Expected date: 10/10/2017  -     T3, free; Future; Expected date: 10/10/2017

## 2017-10-10 NOTE — PATIENT INSTRUCTIONS
Mixed hyperlipidemia. Maintain low fat high fiber diet, exercise regularly. Cont atorvastatin  -     Lipid panel; Future; Expected date: 10/10/2017  -     Comprehensive metabolic panel; Future; Expected date: 10/10/2017  -     TSH; Future; Expected date: 10/10/2017  -     T4, free; Future; Expected date: 10/10/2017  -     T3, free; Future; Expected date: 10/10/2017    Essential hypertension. Maintain < 2 Gm Na a day diet, and monitor BP at home; keep a log. On lisinopril/metoprolol.  -     Magnesium; Future; Expected date: 10/10/2017  -     Urinalysis; Future; Expected date: 10/10/2017    ROBBY (obstructive sleep apnea); to see dr mckeon about CPAP when he's ready.    Type 2 diabetes mellitus without complication, without long-term current use of insulin; on metformin.  -     Hemoglobin A1c; Future; Expected date: 10/10/2017    Class 1 obesity due to excess calories with serious comorbidity and body mass index (BMI) of 30.0 to 30.9 in adult;              Caloric restriction w regular exercise and weight reduction.    Prostate cancer screening  -     PSA, Screening; Future; Expected date: 10/10/2017    Anemia, chronic disease  -     CBC auto differential; Future; Expected date: 10/10/2017    Hypothyroidism, unspecified type; not on supplements yet-     TSH; Future; Expected date: 10/10/2017  -     T4, free; Future; Expected date: 10/10/2017  -     T3, free; Future; Expected date: 10/10/2017    Return to clinic in 3 mos; labs a few days prior; fast before his labs.

## 2017-10-22 DIAGNOSIS — I10 ESSENTIAL HYPERTENSION: ICD-10-CM

## 2017-10-22 RX ORDER — INDAPAMIDE 1.25 MG/1
TABLET ORAL
Qty: 30 TABLET | Refills: 2 | Status: SHIPPED | OUTPATIENT
Start: 2017-10-22 | End: 2018-02-11 | Stop reason: SDUPTHER

## 2017-10-24 RX ORDER — ALBUTEROL SULFATE 90 UG/1
AEROSOL, METERED RESPIRATORY (INHALATION)
Qty: 18 G | Refills: 2 | Status: SHIPPED | OUTPATIENT
Start: 2017-10-24 | End: 2017-12-28 | Stop reason: SDUPTHER

## 2017-10-24 NOTE — TELEPHONE ENCOUNTER
----- Message from Melia Camargo sent at 10/24/2017  1:52 PM CDT -----  Contact: Self  Patient is requesting a refill of his VENTOLIN HFA 90 mcg/actuation inhaler.  With the pharmacies mix up last week sending the request to the wrong doctor he is now out of his medication.  Please call [phone] (home).  Thank you!    [facility] [address] [place] [address] & [address] [address]  Phone: [phone] Fax: [phone]

## 2017-10-31 DIAGNOSIS — Z95.5 S/P CORONARY ARTERY STENT PLACEMENT: ICD-10-CM

## 2017-10-31 DIAGNOSIS — E78.2 MIXED HYPERLIPIDEMIA: ICD-10-CM

## 2017-11-01 RX ORDER — ATORVASTATIN CALCIUM 40 MG/1
TABLET, FILM COATED ORAL
Qty: 30 TABLET | Refills: 2 | Status: SHIPPED | OUTPATIENT
Start: 2017-11-01 | End: 2018-01-28 | Stop reason: SDUPTHER

## 2017-11-14 RX ORDER — METFORMIN HYDROCHLORIDE 500 MG/1
TABLET ORAL
Qty: 60 TABLET | Refills: 2 | Status: SHIPPED | OUTPATIENT
Start: 2017-11-14 | End: 2018-02-11 | Stop reason: SDUPTHER

## 2017-11-28 RX ORDER — LISINOPRIL 20 MG/1
20 TABLET ORAL 2 TIMES DAILY
Qty: 60 TABLET | Refills: 2 | Status: SHIPPED | OUTPATIENT
Start: 2017-11-28 | End: 2018-02-22 | Stop reason: SDUPTHER

## 2017-12-13 ENCOUNTER — PATIENT MESSAGE (OUTPATIENT)
Dept: FAMILY MEDICINE | Facility: CLINIC | Age: 72
End: 2017-12-13

## 2017-12-13 NOTE — TELEPHONE ENCOUNTER
Pleases see my chart msg and advise.    Thank you.     LOV [id number]    Follow up scheduled for 01/10/2018

## 2017-12-13 NOTE — TELEPHONE ENCOUNTER
Metoprolol succinate should last longer than the atenolol.  Before we switch back for that reason I would like him to try metoprolol succinate 50 mg half tablet by mouth twice a day; first dose in the morning and second dose between 4 and 6 PM and let me know how that does; please remind him also to limit his caffeine intake as this can cause palpitations

## 2017-12-21 DIAGNOSIS — Z13.5 DIABETIC RETINOPATHY SCREENING: ICD-10-CM

## 2017-12-28 RX ORDER — ALBUTEROL SULFATE 90 UG/1
AEROSOL, METERED RESPIRATORY (INHALATION)
Qty: 18 G | Refills: 0 | Status: SHIPPED | OUTPATIENT
Start: 2017-12-28 | End: 2018-02-14 | Stop reason: SDUPTHER

## 2018-01-15 RX ORDER — METOPROLOL SUCCINATE 50 MG/1
TABLET, EXTENDED RELEASE ORAL
Qty: 90 TABLET | Refills: 0 | Status: SHIPPED | OUTPATIENT
Start: 2018-01-15 | End: 2018-04-16 | Stop reason: SDUPTHER

## 2018-01-28 DIAGNOSIS — Z95.5 S/P CORONARY ARTERY STENT PLACEMENT: ICD-10-CM

## 2018-01-28 DIAGNOSIS — E78.2 MIXED HYPERLIPIDEMIA: ICD-10-CM

## 2018-01-29 RX ORDER — ATORVASTATIN CALCIUM 40 MG/1
TABLET, FILM COATED ORAL
Qty: 30 TABLET | Refills: 2 | Status: SHIPPED | OUTPATIENT
Start: 2018-01-29 | End: 2018-02-21

## 2018-02-11 DIAGNOSIS — I10 ESSENTIAL HYPERTENSION: ICD-10-CM

## 2018-02-12 RX ORDER — INDAPAMIDE 1.25 MG/1
TABLET ORAL
Qty: 30 TABLET | Refills: 2 | Status: SHIPPED | OUTPATIENT
Start: 2018-02-12 | End: 2018-04-16 | Stop reason: SDUPTHER

## 2018-02-12 RX ORDER — METFORMIN HYDROCHLORIDE 500 MG/1
TABLET ORAL
Qty: 60 TABLET | Refills: 2 | Status: SHIPPED | OUTPATIENT
Start: 2018-02-12 | End: 2018-06-25 | Stop reason: SDUPTHER

## 2018-02-14 RX ORDER — ALBUTEROL SULFATE 90 UG/1
2 AEROSOL, METERED RESPIRATORY (INHALATION) EVERY 6 HOURS PRN
Qty: 18 G | Refills: 11 | Status: SHIPPED | OUTPATIENT
Start: 2018-02-14 | End: 2019-02-22 | Stop reason: SDUPTHER

## 2018-02-16 ENCOUNTER — LAB VISIT (OUTPATIENT)
Dept: LAB | Facility: HOSPITAL | Age: 73
End: 2018-02-16
Attending: INTERNAL MEDICINE
Payer: MEDICARE

## 2018-02-16 DIAGNOSIS — E03.9 HYPOTHYROIDISM, UNSPECIFIED TYPE: ICD-10-CM

## 2018-02-16 DIAGNOSIS — Z12.5 PROSTATE CANCER SCREENING: ICD-10-CM

## 2018-02-16 DIAGNOSIS — E78.2 MIXED HYPERLIPIDEMIA: ICD-10-CM

## 2018-02-16 DIAGNOSIS — E11.9 TYPE 2 DIABETES MELLITUS WITHOUT COMPLICATION, WITHOUT LONG-TERM CURRENT USE OF INSULIN: ICD-10-CM

## 2018-02-16 DIAGNOSIS — D63.8 ANEMIA, CHRONIC DISEASE: ICD-10-CM

## 2018-02-16 DIAGNOSIS — I10 ESSENTIAL HYPERTENSION: ICD-10-CM

## 2018-02-16 LAB
ALBUMIN SERPL BCP-MCNC: 3.6 G/DL
ALP SERPL-CCNC: 65 U/L
ALT SERPL W/O P-5'-P-CCNC: 25 U/L
ANION GAP SERPL CALC-SCNC: 11 MMOL/L
AST SERPL-CCNC: 25 U/L
BASOPHILS # BLD AUTO: 0.05 K/UL
BASOPHILS NFR BLD: 0.6 %
BILIRUB SERPL-MCNC: 0.6 MG/DL
BUN SERPL-MCNC: 31 MG/DL
CALCIUM SERPL-MCNC: 10.2 MG/DL
CHLORIDE SERPL-SCNC: 104 MMOL/L
CHOLEST SERPL-MCNC: 157 MG/DL
CHOLEST/HDLC SERPL: 4.1 {RATIO}
CO2 SERPL-SCNC: 26 MMOL/L
COMPLEXED PSA SERPL-MCNC: 1.3 NG/ML
CREAT SERPL-MCNC: 1.2 MG/DL
DIFFERENTIAL METHOD: ABNORMAL
EOSINOPHIL # BLD AUTO: 0.6 K/UL
EOSINOPHIL NFR BLD: 7 %
ERYTHROCYTE [DISTWIDTH] IN BLOOD BY AUTOMATED COUNT: 12.5 %
EST. GFR  (AFRICAN AMERICAN): >60 ML/MIN/1.73 M^2
EST. GFR  (NON AFRICAN AMERICAN): >60 ML/MIN/1.73 M^2
ESTIMATED AVG GLUCOSE: 126 MG/DL
GLUCOSE SERPL-MCNC: 115 MG/DL
HBA1C MFR BLD HPLC: 6 %
HCT VFR BLD AUTO: 34.3 %
HDLC SERPL-MCNC: 38 MG/DL
HDLC SERPL: 24.2 %
HGB BLD-MCNC: 11.2 G/DL
IMM GRANULOCYTES # BLD AUTO: 0.02 K/UL
IMM GRANULOCYTES NFR BLD AUTO: 0.2 %
LDLC SERPL CALC-MCNC: 78.8 MG/DL
LYMPHOCYTES # BLD AUTO: 1.8 K/UL
LYMPHOCYTES NFR BLD: 20.2 %
MAGNESIUM SERPL-MCNC: 1.4 MG/DL
MCH RBC QN AUTO: 30.9 PG
MCHC RBC AUTO-ENTMCNC: 32.7 G/DL
MCV RBC AUTO: 95 FL
MONOCYTES # BLD AUTO: 0.8 K/UL
MONOCYTES NFR BLD: 9 %
NEUTROPHILS # BLD AUTO: 5.6 K/UL
NEUTROPHILS NFR BLD: 63 %
NONHDLC SERPL-MCNC: 119 MG/DL
NRBC BLD-RTO: 0 /100 WBC
PLATELET # BLD AUTO: 198 K/UL
PMV BLD AUTO: 10.5 FL
POTASSIUM SERPL-SCNC: 4.9 MMOL/L
PROT SERPL-MCNC: 7.6 G/DL
RBC # BLD AUTO: 3.62 M/UL
SODIUM SERPL-SCNC: 141 MMOL/L
T3FREE SERPL-MCNC: 2.3 PG/ML
T4 FREE SERPL-MCNC: 0.89 NG/DL
TRIGL SERPL-MCNC: 201 MG/DL
TSH SERPL DL<=0.005 MIU/L-ACNC: 2.33 UIU/ML
WBC # BLD AUTO: 8.81 K/UL

## 2018-02-16 PROCEDURE — 80053 COMPREHEN METABOLIC PANEL: CPT

## 2018-02-16 PROCEDURE — 83735 ASSAY OF MAGNESIUM: CPT

## 2018-02-16 PROCEDURE — 80061 LIPID PANEL: CPT

## 2018-02-16 PROCEDURE — 84481 FREE ASSAY (FT-3): CPT

## 2018-02-16 PROCEDURE — 85025 COMPLETE CBC W/AUTO DIFF WBC: CPT

## 2018-02-16 PROCEDURE — 84443 ASSAY THYROID STIM HORMONE: CPT

## 2018-02-16 PROCEDURE — 84153 ASSAY OF PSA TOTAL: CPT

## 2018-02-16 PROCEDURE — 36415 COLL VENOUS BLD VENIPUNCTURE: CPT | Mod: PN

## 2018-02-16 PROCEDURE — 84439 ASSAY OF FREE THYROXINE: CPT

## 2018-02-16 PROCEDURE — 83036 HEMOGLOBIN GLYCOSYLATED A1C: CPT

## 2018-02-17 ENCOUNTER — TELEPHONE (OUTPATIENT)
Dept: FAMILY MEDICINE | Facility: CLINIC | Age: 73
End: 2018-02-17

## 2018-02-17 RX ORDER — LANOLIN ALCOHOL/MO/W.PET/CERES
CREAM (GRAM) TOPICAL
Qty: 60 TABLET | Refills: 2 | COMMUNITY
Start: 2018-02-17 | End: 2019-02-14

## 2018-02-18 NOTE — TELEPHONE ENCOUNTER
Notify patient that we sent Mag-Ox 400,  2 by mouth daily to the pharmacist for him to begin taking as his magnesium level was low.  Please keep his follow-up appointment coming up shortly to go over his labs some of them were abnormal.

## 2018-02-19 ENCOUNTER — TELEPHONE (OUTPATIENT)
Dept: FAMILY MEDICINE | Facility: CLINIC | Age: 73
End: 2018-02-19

## 2018-02-19 NOTE — TELEPHONE ENCOUNTER
----- Message from Myah Conway sent at 2/19/2018 10:01 AM CST -----  Contact: self  Returning your call. Please call back at [phone] (jnnu)

## 2018-02-19 NOTE — TELEPHONE ENCOUNTER
Spoke with pt and informed him that due to magnesium level love that Mag-Ox 400 mg was called into pharmacy.     Pt verbally understands.

## 2018-02-19 NOTE — TELEPHONE ENCOUNTER
Attempted to contact pt to inform him that due to his magnesium level low that Mag-Ox 400 mg was called into pharmacy.     No answer; left message to return call to inform.     Pt has a follow up scheduled for 02/23/2018

## 2018-02-21 ENCOUNTER — OFFICE VISIT (OUTPATIENT)
Dept: FAMILY MEDICINE | Facility: CLINIC | Age: 73
End: 2018-02-21
Payer: MEDICARE

## 2018-02-21 VITALS
TEMPERATURE: 98 F | HEIGHT: 70 IN | HEART RATE: 73 BPM | BODY MASS INDEX: 30.42 KG/M2 | SYSTOLIC BLOOD PRESSURE: 138 MMHG | DIASTOLIC BLOOD PRESSURE: 80 MMHG | OXYGEN SATURATION: 96 % | WEIGHT: 212.5 LBS

## 2018-02-21 DIAGNOSIS — Z95.5 S/P CORONARY ARTERY STENT PLACEMENT: ICD-10-CM

## 2018-02-21 DIAGNOSIS — E11.9 TYPE 2 DIABETES MELLITUS WITHOUT COMPLICATION, WITHOUT LONG-TERM CURRENT USE OF INSULIN: ICD-10-CM

## 2018-02-21 DIAGNOSIS — D64.9 ANEMIA, UNSPECIFIED TYPE: ICD-10-CM

## 2018-02-21 DIAGNOSIS — E83.42 HYPOMAGNESEMIA: ICD-10-CM

## 2018-02-21 DIAGNOSIS — E03.9 HYPOTHYROIDISM, UNSPECIFIED TYPE: ICD-10-CM

## 2018-02-21 DIAGNOSIS — E78.2 MIXED HYPERLIPIDEMIA: Primary | ICD-10-CM

## 2018-02-21 DIAGNOSIS — I10 ESSENTIAL HYPERTENSION: ICD-10-CM

## 2018-02-21 DIAGNOSIS — E66.3 OVERWEIGHT (BMI 25.0-29.9): ICD-10-CM

## 2018-02-21 DIAGNOSIS — D50.8 IRON DEFICIENCY ANEMIA SECONDARY TO INADEQUATE DIETARY IRON INTAKE: ICD-10-CM

## 2018-02-21 PROCEDURE — 99999 PR PBB SHADOW E&M-EST. PATIENT-LVL III: CPT | Mod: PBBFAC,,, | Performed by: INTERNAL MEDICINE

## 2018-02-21 PROCEDURE — 3008F BODY MASS INDEX DOCD: CPT | Mod: S$GLB,,, | Performed by: INTERNAL MEDICINE

## 2018-02-21 PROCEDURE — 1159F MED LIST DOCD IN RCRD: CPT | Mod: S$GLB,,, | Performed by: INTERNAL MEDICINE

## 2018-02-21 PROCEDURE — 1126F AMNT PAIN NOTED NONE PRSNT: CPT | Mod: S$GLB,,, | Performed by: INTERNAL MEDICINE

## 2018-02-21 PROCEDURE — 99214 OFFICE O/P EST MOD 30 MIN: CPT | Mod: S$GLB,,, | Performed by: INTERNAL MEDICINE

## 2018-02-21 RX ORDER — ATORVASTATIN CALCIUM 80 MG/1
80 TABLET, FILM COATED ORAL DAILY
Qty: 90 TABLET | Refills: 1 | Status: SHIPPED | OUTPATIENT
Start: 2018-02-21 | End: 2019-02-14

## 2018-02-21 NOTE — PROGRESS NOTES
"Subjective:       Patient ID: Jadiel Rossi is a 72 y.o. male.    Chief Complaint: Follow-up (labs)    HPI  Pt overall doing better; moving metoprolol to 25 mg BID helping palpitations clear; HTN: BP at home 147-150/67-70; watches his salt. Intolerant of CPAP. HLP; on low fat high fiber diet. On krill oil/omeg-3 but out 2 weeks. 10 yr ASCVD risk elevated 42.8%. Will increase lipitor to 80 mg a day  DM2 w CBG's ; watches his carbs; on metformin. HgA1C improved to 6.0. Saw Dr Ward 2 weeks ago; his NP; well enough not to see MD; EKG done.    Review of Systems   Constitutional: Negative for appetite change and unexpected weight change.   HENT: Negative for congestion, postnasal drip, rhinorrhea and sinus pressure.         Denies seasonal allergies, or perennial allergies   Eyes: Negative for discharge and itching.   Respiratory: Negative for cough, chest tightness, shortness of breath and wheezing.    Cardiovascular: Negative for chest pain, palpitations and leg swelling.   Gastrointestinal: Negative for abdominal pain, blood in stool, constipation, diarrhea, nausea and vomiting.   Endocrine: Negative for polydipsia, polyphagia and polyuria.   Genitourinary: Negative for dysuria and hematuria.   Musculoskeletal: Negative for arthralgias and myalgias.   Skin: Negative for rash.   Allergic/Immunologic: Negative for environmental allergies and food allergies.   Neurological: Negative for tremors, seizures and headaches.   Hematological: Negative for adenopathy. Bruises/bleeds easily.        On plavix generic.   Psychiatric/Behavioral:        Denies anxiety or depression.       Objective:      Vitals:    02/21/18 1545   BP: 138/80   BP Location: Right arm   Patient Position: Sitting   BP Method: Medium (Manual)   Pulse: 73   Temp: 97.9 °F (36.6 °C)   TempSrc: Oral   SpO2: 96%   Weight: 96.4 kg (212 lb 8.4 oz)   Height: 5' 10" (1.778 m)     Body mass index is 30.49 kg/m².    Physical Exam   Constitutional: He is " oriented to person, place, and time. He appears well-developed and well-nourished.   HENT:   Head: Normocephalic and atraumatic.   Eyes: EOM are normal.   Neck: Normal range of motion. Neck supple. No thyromegaly present.   Cardiovascular: Normal rate and regular rhythm.  Exam reveals no gallop.    No murmur heard.  MARCO ANTONIO 3/6 through   Pulmonary/Chest: Effort normal and breath sounds normal. No respiratory distress. He has no wheezes. He has no rales.   Abdominal: Soft. Bowel sounds are normal. He exhibits no distension. There is no tenderness. There is no rebound and no guarding.   Musculoskeletal: Normal range of motion. He exhibits no edema.   Lymphadenopathy:     He has no cervical adenopathy.   Neurological: He is alert and oriented to person, place, and time.   Moves all 4 extremities fine.   Skin: No rash noted.   Psychiatric: He has a normal mood and affect. His behavior is normal. Thought content normal.   Vitals reviewed.      Assessment:       1. Mixed hyperlipidemia    2. S/P coronary artery stent placement    3. Type 2 diabetes mellitus without complication, without long-term current use of insulin    4. Essential hypertension    5. Hypothyroidism, unspecified type    6. Overweight (BMI 25.0-29.9)    7. Hypomagnesemia    8. Anemia, unspecified type    9. Iron deficiency anemia secondary to inadequate dietary iron intake        Plan:       Mixed hyperlipidemia. Maintain low fat high fiber diet, exercise regularly. Increase lipitor to 80 mg a day.    S/P coronary artery stent placement; stable; just saw Dr Ward's NP.    Type 2 diabetes mellitus without complication, without long-term current use of insulin. Maintain a low carb diet;       monitor CBG's at home; keep a log for review. On metformin.    Essential hypertension. Maintain < 2 Gm Na a day diet, and monitor BP at home; keep a log. Same meds.    Hypothyroidism, unspecified type; same dosing; check levels periodically.    Overweight (BMI 25.0-29.9).  Caloric restriction w regular exercise and weight reduction.    Schedule preventative medicine evaluation.in next few weeks; DM foot exam needed; also needs to see his ophthalmologist;        due now; to send us a copy of report. Bring in any vaccine records and TC.

## 2018-02-22 RX ORDER — LISINOPRIL 20 MG/1
TABLET ORAL
Qty: 60 TABLET | Refills: 2 | Status: SHIPPED | OUTPATIENT
Start: 2018-02-22 | End: 2018-04-16 | Stop reason: SDUPTHER

## 2018-02-22 NOTE — PATIENT INSTRUCTIONS
Mixed hyperlipidemia. Maintain low fat high fiber diet, exercise regularly. Increase lipitor to 80 mg a day.    S/P coronary artery stent placement; stable; just saw Dr Ward's NP.    Type 2 diabetes mellitus without complication, without long-term current use of insulin. Maintain a low carb diet;       monitor CBG's at home; keep a log for review. On metformin.    Essential hypertension. Maintain < 2 Gm Na a day diet, and monitor BP at home; keep a log. Same meds.    Hypothyroidism, unspecified type; same dosing; check levels periodically.    Overweight (BMI 25.0-29.9). Caloric restriction w regular exercise and weight reduction.    Schedule preventative medicine evaluation.in next few weeks; DM foot exam needed; also needs to see his ophthalmologist;        due now; to send us a copy of report. Bring in any vaccine records and TC.

## 2018-04-16 DIAGNOSIS — I10 ESSENTIAL HYPERTENSION: ICD-10-CM

## 2018-04-16 RX ORDER — LISINOPRIL 20 MG/1
TABLET ORAL
Qty: 180 TABLET | Refills: 2 | Status: SHIPPED | OUTPATIENT
Start: 2018-04-16 | End: 2019-01-26 | Stop reason: SDUPTHER

## 2018-04-16 RX ORDER — METOPROLOL SUCCINATE 50 MG/1
TABLET, EXTENDED RELEASE ORAL
Qty: 90 TABLET | Refills: 1 | Status: SHIPPED | OUTPATIENT
Start: 2018-04-16 | End: 2018-09-10

## 2018-04-16 RX ORDER — INDAPAMIDE 1.25 MG/1
TABLET ORAL
Qty: 30 TABLET | Refills: 2 | Status: SHIPPED | OUTPATIENT
Start: 2018-04-16 | End: 2018-06-07 | Stop reason: SDUPTHER

## 2018-05-10 ENCOUNTER — TELEPHONE (OUTPATIENT)
Dept: FAMILY MEDICINE | Facility: CLINIC | Age: 73
End: 2018-05-10

## 2018-05-10 ENCOUNTER — PATIENT MESSAGE (OUTPATIENT)
Dept: FAMILY MEDICINE | Facility: CLINIC | Age: 73
End: 2018-05-10

## 2018-05-10 NOTE — TELEPHONE ENCOUNTER
Please notify the patient that 12/21/17an order for diabetic retinal photography screening was placed and has not been performed yet.  Unable to find the name of any ophthalmologist is seen in the record after scanning through it.  If he needs an eye doctor recommended Dr. Lee, for evaluation and management.  Please remind patient he's supposed to be having eye evaluations yearly  at least with his history of diabetes.  Is let us know his decision so we can place that I consult for him.  He also can obtain retinal eye screening photography here at [facility].  Tell Dexter, thanks about asking about my health!  It's been nice feeling my strength come back!!

## 2018-05-25 LAB
LEFT EYE DM RETINOPATHY: NEGATIVE
RIGHT EYE DM RETINOPATHY: NEGATIVE

## 2018-06-07 DIAGNOSIS — I10 ESSENTIAL HYPERTENSION: ICD-10-CM

## 2018-06-08 RX ORDER — INDAPAMIDE 1.25 MG/1
TABLET ORAL
Qty: 90 TABLET | Refills: 0 | Status: SHIPPED | OUTPATIENT
Start: 2018-06-08 | End: 2018-11-05 | Stop reason: SDUPTHER

## 2018-06-25 RX ORDER — METFORMIN HYDROCHLORIDE 500 MG/1
TABLET ORAL
Qty: 180 TABLET | Refills: 1 | Status: SHIPPED | OUTPATIENT
Start: 2018-06-25 | End: 2018-09-10

## 2018-07-20 DIAGNOSIS — E11.9 TYPE 2 DIABETES MELLITUS WITHOUT COMPLICATION: ICD-10-CM

## 2018-07-23 RX ORDER — METFORMIN HYDROCHLORIDE 500 MG/1
TABLET ORAL
Qty: 180 TABLET | Refills: 2 | Status: SHIPPED | OUTPATIENT
Start: 2018-07-23 | End: 2019-05-22 | Stop reason: SDUPTHER

## 2018-07-23 RX ORDER — ATENOLOL 50 MG/1
TABLET ORAL
Qty: 180 TABLET | Refills: 2 | Status: SHIPPED | OUTPATIENT
Start: 2018-07-23 | End: 2018-09-10

## 2018-08-09 ENCOUNTER — LAB VISIT (OUTPATIENT)
Dept: LAB | Facility: HOSPITAL | Age: 73
End: 2018-08-09
Attending: INTERNAL MEDICINE
Payer: MEDICARE

## 2018-08-09 DIAGNOSIS — I10 ESSENTIAL HYPERTENSION: ICD-10-CM

## 2018-08-09 DIAGNOSIS — D64.9 ANEMIA, UNSPECIFIED TYPE: ICD-10-CM

## 2018-08-09 DIAGNOSIS — D50.8 IRON DEFICIENCY ANEMIA SECONDARY TO INADEQUATE DIETARY IRON INTAKE: ICD-10-CM

## 2018-08-09 LAB
BASOPHILS # BLD AUTO: 0.07 K/UL
BASOPHILS NFR BLD: 0.7 %
DIFFERENTIAL METHOD: ABNORMAL
EOSINOPHIL # BLD AUTO: 0.5 K/UL
EOSINOPHIL NFR BLD: 5.5 %
ERYTHROCYTE [DISTWIDTH] IN BLOOD BY AUTOMATED COUNT: 12.8 %
ERYTHROCYTE [SEDIMENTATION RATE] IN BLOOD BY WESTERGREN METHOD: 35 MM/HR
FERRITIN SERPL-MCNC: 468 NG/ML
HCT VFR BLD AUTO: 33.4 %
HGB BLD-MCNC: 11.1 G/DL
IMM GRANULOCYTES # BLD AUTO: 0.04 K/UL
IMM GRANULOCYTES NFR BLD AUTO: 0.4 %
IRON SERPL-MCNC: 67 UG/DL
LYMPHOCYTES # BLD AUTO: 2.2 K/UL
LYMPHOCYTES NFR BLD: 22.6 %
MAGNESIUM SERPL-MCNC: 1.8 MG/DL
MCH RBC QN AUTO: 31 PG
MCHC RBC AUTO-ENTMCNC: 33.2 G/DL
MCV RBC AUTO: 93 FL
MONOCYTES # BLD AUTO: 0.9 K/UL
MONOCYTES NFR BLD: 8.9 %
NEUTROPHILS # BLD AUTO: 6 K/UL
NEUTROPHILS NFR BLD: 61.9 %
NRBC BLD-RTO: 0 /100 WBC
PLATELET # BLD AUTO: 242 K/UL
PMV BLD AUTO: 10.5 FL
RBC # BLD AUTO: 3.58 M/UL
SATURATED IRON: 20 %
TOTAL IRON BINDING CAPACITY: 327 UG/DL
TRANSFERRIN SERPL-MCNC: 221 MG/DL
WBC # BLD AUTO: 9.74 K/UL

## 2018-08-09 PROCEDURE — 82728 ASSAY OF FERRITIN: CPT

## 2018-08-09 PROCEDURE — 85025 COMPLETE CBC W/AUTO DIFF WBC: CPT

## 2018-08-09 PROCEDURE — 36415 COLL VENOUS BLD VENIPUNCTURE: CPT | Mod: PN

## 2018-08-09 PROCEDURE — 85652 RBC SED RATE AUTOMATED: CPT

## 2018-08-09 PROCEDURE — 83540 ASSAY OF IRON: CPT

## 2018-08-09 PROCEDURE — 83735 ASSAY OF MAGNESIUM: CPT

## 2018-08-31 DIAGNOSIS — E11.9 TYPE 2 DIABETES MELLITUS WITHOUT COMPLICATION: ICD-10-CM

## 2018-09-10 ENCOUNTER — OFFICE VISIT (OUTPATIENT)
Dept: FAMILY MEDICINE | Facility: CLINIC | Age: 73
End: 2018-09-10
Payer: MEDICARE

## 2018-09-10 VITALS
SYSTOLIC BLOOD PRESSURE: 138 MMHG | HEART RATE: 76 BPM | DIASTOLIC BLOOD PRESSURE: 82 MMHG | BODY MASS INDEX: 30.19 KG/M2 | HEIGHT: 70 IN | TEMPERATURE: 98 F | WEIGHT: 210.88 LBS

## 2018-09-10 DIAGNOSIS — Z00.00 HEALTHCARE MAINTENANCE: ICD-10-CM

## 2018-09-10 DIAGNOSIS — Z23 INFLUENZA VACCINE NEEDED: ICD-10-CM

## 2018-09-10 DIAGNOSIS — E11.9 TYPE 2 DIABETES MELLITUS WITHOUT COMPLICATION, WITHOUT LONG-TERM CURRENT USE OF INSULIN: ICD-10-CM

## 2018-09-10 DIAGNOSIS — E78.2 MIXED HYPERLIPIDEMIA: ICD-10-CM

## 2018-09-10 DIAGNOSIS — E11.9 ENCOUNTER FOR DIABETIC FOOT EXAM: ICD-10-CM

## 2018-09-10 DIAGNOSIS — Z23 NEED FOR VACCINATION AGAINST STREPTOCOCCUS PNEUMONIAE USING PNEUMOCOCCAL CONJUGATE VACCINE 13: ICD-10-CM

## 2018-09-10 DIAGNOSIS — D64.9 ANEMIA, UNSPECIFIED TYPE: ICD-10-CM

## 2018-09-10 DIAGNOSIS — E66.3 OVERWEIGHT (BMI 25.0-29.9): ICD-10-CM

## 2018-09-10 DIAGNOSIS — I10 ESSENTIAL HYPERTENSION: ICD-10-CM

## 2018-09-10 DIAGNOSIS — Z95.5 S/P CORONARY ARTERY STENT PLACEMENT: ICD-10-CM

## 2018-09-10 DIAGNOSIS — Z00.00 ANNUAL PHYSICAL EXAM: Primary | ICD-10-CM

## 2018-09-10 DIAGNOSIS — Z12.11 COLON CANCER SCREENING: ICD-10-CM

## 2018-09-10 PROCEDURE — 90662 IIV NO PRSV INCREASED AG IM: CPT | Mod: PBBFAC,PN

## 2018-09-10 PROCEDURE — 3079F DIAST BP 80-89 MM HG: CPT | Mod: CPTII,,, | Performed by: INTERNAL MEDICINE

## 2018-09-10 PROCEDURE — 90670 PCV13 VACCINE IM: CPT | Mod: PBBFAC,PN

## 2018-09-10 PROCEDURE — 99397 PER PM REEVAL EST PAT 65+ YR: CPT | Mod: S$PBB,,, | Performed by: INTERNAL MEDICINE

## 2018-09-10 PROCEDURE — 3075F SYST BP GE 130 - 139MM HG: CPT | Mod: CPTII,,, | Performed by: INTERNAL MEDICINE

## 2018-09-10 PROCEDURE — 99999 PR PBB SHADOW E&M-EST. PATIENT-LVL III: CPT | Mod: PBBFAC,,, | Performed by: INTERNAL MEDICINE

## 2018-09-10 PROCEDURE — 3044F HG A1C LEVEL LT 7.0%: CPT | Mod: CPTII,,, | Performed by: INTERNAL MEDICINE

## 2018-09-10 PROCEDURE — 99213 OFFICE O/P EST LOW 20 MIN: CPT | Mod: PBBFAC,PN | Performed by: INTERNAL MEDICINE

## 2018-09-10 RX ORDER — METOPROLOL SUCCINATE 100 MG/1
100 TABLET, EXTENDED RELEASE ORAL DAILY
Status: ON HOLD | COMMUNITY
End: 2019-05-01

## 2018-09-10 RX ORDER — APIXABAN 5 MG/1
1 TABLET, FILM COATED ORAL 2 TIMES DAILY
Refills: 3 | Status: ON HOLD | COMMUNITY
Start: 2018-08-31 | End: 2020-04-26

## 2018-09-10 NOTE — PROGRESS NOTES
"Subjective:       Patient ID: Jadiel Rossi is a 72 y.o. male.    Chief Complaint: Annual Exam    HPI  Pt here today for his annual physical. PMH and surgical hx delineated and noted; SMH/FMH also delineated and noted; ROS noted before physical performed.  TC has never had baseline TC; will refer to GI for first TC w Dr Barnard.   PSA 2/16/18 was 1.3; no FMH of colon or prostate cancer.  Vaccines discussed w pt at length; today to receive influenza and prevnar-13; given script for Tdap to go to pharmacy.  HTN: BP at home 145/62; watches his salt intake; BP here 138/82 manually.   In hospital in July at [facility] for AF w RVR; metoprolol XL increased to 100 mg a day.   Eliquis added.  DM2; BS's running 100's-110's; and watches his carbs; on metformin.  HLP; on low fat high fiber diet; on atorvastatin; tolerates it fine.    Review of Systems   Constitutional: Negative for activity change and unexpected weight change.   HENT: Negative for hearing loss, rhinorrhea and trouble swallowing.    Eyes: Negative for discharge and visual disturbance.   Respiratory: Negative for chest tightness and wheezing.    Cardiovascular: Negative for chest pain and palpitations.   Gastrointestinal: Negative for blood in stool, constipation, diarrhea and vomiting.   Endocrine: Negative for polydipsia and polyuria.   Genitourinary: Negative for difficulty urinating, hematuria and urgency.   Musculoskeletal: Negative for arthralgias, joint swelling and neck pain.   Skin: Negative for rash.   Neurological: Negative for weakness and headaches.   Psychiatric/Behavioral: Negative for confusion and dysphoric mood.       Objective:      Vitals:    09/10/18 1559   BP: 138/82   Pulse: 76   Temp: 98.3 °F (36.8 °C)   Weight: 95.7 kg (210 lb 13.9 oz)   Height: 5' 10" (1.778 m)     Body mass index is 30.26 kg/m².    Physical Exam   Constitutional: He is oriented to person, place, and time. He appears well-developed and well-nourished.   HENT:   Head: " Normocephalic and atraumatic.   Eyes: EOM are normal.   Neck: Normal range of motion. Neck supple. No thyromegaly present.   Cardiovascular: Normal rate, regular rhythm and normal heart sounds. Exam reveals no gallop.   No murmur heard.  Pulses:       Dorsalis pedis pulses are 2+ on the right side, and 2+ on the left side.   MARCO ANTONIO: 3-4/6 aortic; 2-3/6 pulm; 3/6 LLS and apex.   Pulmonary/Chest: Effort normal and breath sounds normal. No respiratory distress. He has no wheezes. He has no rales.   Decreased shraddha BS's   Abdominal: Soft. Bowel sounds are normal. He exhibits no distension. There is no tenderness. There is no rebound and no guarding.   Musculoskeletal: Normal range of motion. He exhibits no edema.   Feet:   Right Foot:   Protective Sensation: 10 sites tested. 10 sites sensed.   Skin Integrity: Negative for ulcer, skin breakdown, erythema or callus.   Left Foot:   Protective Sensation: 10 sites tested. 10 sites sensed.   Skin Integrity: Negative for ulcer, skin breakdown, erythema or callus.   Lymphadenopathy:     He has no cervical adenopathy.   Neurological: He is alert and oriented to person, place, and time.   Moves all 4 extremities fine.   Skin: No rash noted.   Psychiatric: He has a normal mood and affect. His behavior is normal. Thought content normal.   Vitals reviewed.      Assessment:       1. Annual physical exam    2. Essential hypertension    3. Mixed hyperlipidemia    4. S/P coronary artery stent placement    5. Anemia, unspecified type    6. Type 2 diabetes mellitus without complication, without long-term current use of insulin    7. Encounter for diabetic foot exam    8. Overweight (BMI 25.0-29.9)    9. Influenza vaccine needed    10. Need for vaccination against Streptococcus pneumoniae using pneumococcal conjugate vaccine 13    11. Healthcare maintenance    12. Colon cancer screening        Plan:       Annual physical exam; obtain yearly; healthy lifestyles; exercise w stress reduction and  weight reduction as well. Follow BP and BS's at home and keep a log for office review. TC needed as below for initial screen for colon cancer. PSA done this year was nl.  -     Hemoglobin A1c; Future; Expected date: 09/10/2018  -     Hepatitis C antibody; Future; Expected date: 09/10/2018  -     Comprehensive metabolic panel; Future; Expected date: 09/10/2018  -     CBC auto differential; Future; Expected date: 09/10/2018  -     Lipid panel; Future; Expected date: 09/10/2018  -     TSH; Future; Expected date: 09/10/2018  -     Microalbumin/creatinine urine ratio; Future; Expected date: 09/10/2018  -     Urinalysis; Future; Expected date: 09/10/2018  -     T4, free; Future; Expected date: 09/10/2018    Essential hypertension. Maintain < 2 Gm Na a day diet, and monitor BP at home; keep a log. On amlodipine; metoprolol, lisinopril, and lozol. Doesn't want another med for BP control. Will watch his diet closer.  -     Comprehensive metabolic panel; Future; Expected date: 09/10/2018  -     CBC auto differential; Future; Expected date: 09/10/2018  -     Lipid panel; Future; Expected date: 09/10/2018  -     TSH; Future; Expected date: 09/10/2018  -     Urinalysis; Future; Expected date: 09/10/2018  -     T4, free; Future; Expected date: 09/10/2018    Mixed hyperlipidemia. Maintain low fat high fiber diet, exercise regularly.  -     Comprehensive metabolic panel; Future; Expected date: 09/10/2018    S/P coronary artery stent placement; sees Dr Ward; has been stable,. Sees him on Monday.  -     Comprehensive metabolic panel; Future; Expected date: 09/10/2018  -     CBC auto differential; Future; Expected date: 09/10/2018  -     Lipid panel; Future; Expected date: 09/10/2018    Anemia, unspecified type; MVI daily; has been stable.  -     CBC auto differential; Future; Expected date: 09/10/2018    Type 2 diabetes mellitus without complication, without long-term current use of insulin; on metformin.         Eye exam 3 mos ago  w Dr Tal suggs; was fine; yearly f/u   -     Hemoglobin A1c; Future; Expected date: 09/10/2018  -     Comprehensive metabolic panel; Future; Expected date: 09/10/2018  -     Lipid panel; Future; Expected date: 09/10/2018  -     TSH; Future; Expected date: 09/10/2018  -     Microalbumin/creatinine urine ratio; Future; Expected date: 09/10/2018  -     Urinalysis; Future; Expected date: 09/10/2018  -     T4, free; Future; Expected date: 09/10/2018    Encounter for diabetic foot exam    Overweight (BMI 25.0-29.9). Caloric restriction w regular exercise and weight reduction.  -     TSH; Future; Expected date: 09/10/2018  -     T4, free; Future; Expected date: 09/10/2018    Influenza vaccine needed    Need for vaccination against Streptococcus pneumoniae using pneumococcal conjugate vaccine 13  -     (In Office Administered) Pneumococcal Conjugate Vaccine (13 Valent) (IM)    Healthcare maintenance  -     Hepatitis C antibody; Future; Expected date: 09/10/2018    Colon cancer screening; GI consult Dr Barnard for TC initial needed.    Other orders  -     Influenza - High Dose (65+) (PF) (IM)

## 2018-09-10 NOTE — PATIENT INSTRUCTIONS
Annual physical exam; obtain yearly; healthy lifestyles; exercise w stress reduction and weight reduction as well. Follow BP and BS's at home and keep a log for office review. TC needed as below for initial screen for colon cancer. PSA done this year was nl.  -     Hemoglobin A1c; Future; Expected date: 09/10/2018  -     Hepatitis C antibody; Future; Expected date: 09/10/2018  -     Comprehensive metabolic panel; Future; Expected date: 09/10/2018  -     CBC auto differential; Future; Expected date: 09/10/2018  -     Lipid panel; Future; Expected date: 09/10/2018  -     TSH; Future; Expected date: 09/10/2018  -     Microalbumin/creatinine urine ratio; Future; Expected date: 09/10/2018  -     Urinalysis; Future; Expected date: 09/10/2018  -     T4, free; Future; Expected date: 09/10/2018    Essential hypertension. Maintain < 2 Gm Na a day diet, and monitor BP at home; keep a log. On amlodipine; metoprolol, lisinopril, and lozol. Doesn't want another med for BP control. Will watch his diet closer.  -     Comprehensive metabolic panel; Future; Expected date: 09/10/2018  -     CBC auto differential; Future; Expected date: 09/10/2018  -     Lipid panel; Future; Expected date: 09/10/2018  -     TSH; Future; Expected date: 09/10/2018  -     Urinalysis; Future; Expected date: 09/10/2018  -     T4, free; Future; Expected date: 09/10/2018    Mixed hyperlipidemia. Maintain low fat high fiber diet, exercise regularly.  -     Comprehensive metabolic panel; Future; Expected date: 09/10/2018    S/P coronary artery stent placement; sees Dr Ward; has been stable,. Sees him on Monday.  -     Comprehensive metabolic panel; Future; Expected date: 09/10/2018  -     CBC auto differential; Future; Expected date: 09/10/2018  -     Lipid panel; Future; Expected date: 09/10/2018    Anemia, unspecified type; MVI daily; has been stable.  -     CBC auto differential; Future; Expected date: 09/10/2018    Type 2 diabetes mellitus without  complication, without long-term current use of insulin; on metformin.         Eye exam 3 mos ago w Dr Tal suggs; was fine; yearly f/u   -     Hemoglobin A1c; Future; Expected date: 09/10/2018  -     Comprehensive metabolic panel; Future; Expected date: 09/10/2018  -     Lipid panel; Future; Expected date: 09/10/2018  -     TSH; Future; Expected date: 09/10/2018  -     Microalbumin/creatinine urine ratio; Future; Expected date: 09/10/2018  -     Urinalysis; Future; Expected date: 09/10/2018  -     T4, free; Future; Expected date: 09/10/2018    Encounter for diabetic foot exam    Overweight (BMI 25.0-29.9). Caloric restriction w regular exercise and weight reduction.  -     TSH; Future; Expected date: 09/10/2018  -     T4, free; Future; Expected date: 09/10/2018    Influenza vaccine needed    Need for vaccination against Streptococcus pneumoniae using pneumococcal conjugate vaccine 13  -     (In Office Administered) Pneumococcal Conjugate Vaccine (13 Valent) (IM)    Healthcare maintenance  -     Hepatitis C antibody; Future; Expected date: 09/10/2018    Colon cancer screening; GI consult Dr Barnard for TC initial needed.    Other orders  -     Influenza - High Dose (65+) (PF) (IM)

## 2018-09-19 ENCOUNTER — PATIENT OUTREACH (OUTPATIENT)
Dept: ADMINISTRATIVE | Facility: HOSPITAL | Age: 73
End: 2018-09-19

## 2018-09-19 NOTE — LETTER
September 27, 2018    Jadiel Mccollumregis  [address] [place] [address]             [facility]  [address]  [place] [address]  Phone: [phone] Dear Mr. Rossi:    We have tried to reach you by mychart unsuccessfully.    [facility] is committed to your overall health.  To help you get the most out of each of your visits, we will review your information to make sure you are up to date on all of your recommended tests and or procedures.       Dr. Eliud Chandler MD has found that you may be due for:     One time Hepatitis C screening lab test ( a viral condition that harms the liver)   Colon cancer screening   Shingles vaccine   Urine protein check   Hemoglobin A1C     If you have had any of the above done at another facility, please bring the records or information with you so that your record at [facility] will be complete and up to date.     If you have not had any of these tests or procedures done recently and would like to complete this testing before your appointment on 10/3/18 at 3:40 pm with Eliud Chandler MD please call [phone] or send a message through your [facility] portal to your provider's office.     If you are currently taking medication, please bring it with you to your appointment for review.     Also, if you have any type of Advanced Directives, please bring them with you to your office visit so we may scan them into your chart.     Please feel free to call the number listed below if you have any questions.     Thanks,     Jae Rodriguez LPN Clinical Care Coordinator   [facility]   [address] [facility] [address]   [place] [id number] [phone] (p)   [phone] (f)

## 2018-10-01 ENCOUNTER — LAB VISIT (OUTPATIENT)
Dept: LAB | Facility: HOSPITAL | Age: 73
End: 2018-10-01
Attending: INTERNAL MEDICINE
Payer: MEDICARE

## 2018-10-01 DIAGNOSIS — D64.9 ANEMIA, UNSPECIFIED TYPE: ICD-10-CM

## 2018-10-01 DIAGNOSIS — Z00.00 HEALTHCARE MAINTENANCE: ICD-10-CM

## 2018-10-01 DIAGNOSIS — I10 ESSENTIAL HYPERTENSION: ICD-10-CM

## 2018-10-01 DIAGNOSIS — E66.3 OVERWEIGHT (BMI 25.0-29.9): ICD-10-CM

## 2018-10-01 DIAGNOSIS — E11.9 TYPE 2 DIABETES MELLITUS WITHOUT COMPLICATION, WITHOUT LONG-TERM CURRENT USE OF INSULIN: ICD-10-CM

## 2018-10-01 DIAGNOSIS — Z00.00 ANNUAL PHYSICAL EXAM: ICD-10-CM

## 2018-10-01 DIAGNOSIS — Z95.5 S/P CORONARY ARTERY STENT PLACEMENT: ICD-10-CM

## 2018-10-01 DIAGNOSIS — E78.2 MIXED HYPERLIPIDEMIA: ICD-10-CM

## 2018-10-01 LAB
ALBUMIN SERPL BCP-MCNC: 3.7 G/DL
ALP SERPL-CCNC: 69 U/L
ALT SERPL W/O P-5'-P-CCNC: 28 U/L
ANION GAP SERPL CALC-SCNC: 11 MMOL/L
AST SERPL-CCNC: 22 U/L
BASOPHILS # BLD AUTO: 0.06 K/UL
BASOPHILS NFR BLD: 0.6 %
BILIRUB SERPL-MCNC: 0.4 MG/DL
BUN SERPL-MCNC: 30 MG/DL
CALCIUM SERPL-MCNC: 10.1 MG/DL
CHLORIDE SERPL-SCNC: 105 MMOL/L
CHOLEST SERPL-MCNC: 136 MG/DL
CHOLEST/HDLC SERPL: 5 {RATIO}
CO2 SERPL-SCNC: 24 MMOL/L
CREAT SERPL-MCNC: 1.4 MG/DL
DIFFERENTIAL METHOD: ABNORMAL
EOSINOPHIL # BLD AUTO: 0.6 K/UL
EOSINOPHIL NFR BLD: 6 %
ERYTHROCYTE [DISTWIDTH] IN BLOOD BY AUTOMATED COUNT: 12.8 %
EST. GFR  (AFRICAN AMERICAN): 58 ML/MIN/1.73 M^2
EST. GFR  (NON AFRICAN AMERICAN): 50.2 ML/MIN/1.73 M^2
ESTIMATED AVG GLUCOSE: 134 MG/DL
GLUCOSE SERPL-MCNC: 107 MG/DL
HBA1C MFR BLD HPLC: 6.3 %
HCT VFR BLD AUTO: 33.4 %
HDLC SERPL-MCNC: 27 MG/DL
HDLC SERPL: 19.9 %
HGB BLD-MCNC: 10.8 G/DL
IMM GRANULOCYTES # BLD AUTO: 0.02 K/UL
IMM GRANULOCYTES NFR BLD AUTO: 0.2 %
LDLC SERPL CALC-MCNC: 67 MG/DL
LYMPHOCYTES # BLD AUTO: 2.6 K/UL
LYMPHOCYTES NFR BLD: 26.2 %
MCH RBC QN AUTO: 30 PG
MCHC RBC AUTO-ENTMCNC: 32.3 G/DL
MCV RBC AUTO: 93 FL
MONOCYTES # BLD AUTO: 0.8 K/UL
MONOCYTES NFR BLD: 7.8 %
NEUTROPHILS # BLD AUTO: 5.8 K/UL
NEUTROPHILS NFR BLD: 59.2 %
NONHDLC SERPL-MCNC: 109 MG/DL
NRBC BLD-RTO: 0 /100 WBC
PLATELET # BLD AUTO: 284 K/UL
PMV BLD AUTO: 10.8 FL
POTASSIUM SERPL-SCNC: 4.4 MMOL/L
PROT SERPL-MCNC: 7.8 G/DL
RBC # BLD AUTO: 3.6 M/UL
RETICS/RBC NFR AUTO: 1.5 %
SODIUM SERPL-SCNC: 140 MMOL/L
T4 FREE SERPL-MCNC: 0.97 NG/DL
TRIGL SERPL-MCNC: 210 MG/DL
TSH SERPL DL<=0.005 MIU/L-ACNC: 3.11 UIU/ML
WBC # BLD AUTO: 9.84 K/UL

## 2018-10-01 PROCEDURE — 80061 LIPID PANEL: CPT

## 2018-10-01 PROCEDURE — 83036 HEMOGLOBIN GLYCOSYLATED A1C: CPT

## 2018-10-01 PROCEDURE — 86803 HEPATITIS C AB TEST: CPT

## 2018-10-01 PROCEDURE — 85045 AUTOMATED RETICULOCYTE COUNT: CPT

## 2018-10-01 PROCEDURE — 84443 ASSAY THYROID STIM HORMONE: CPT

## 2018-10-01 PROCEDURE — 36415 COLL VENOUS BLD VENIPUNCTURE: CPT | Mod: PN

## 2018-10-01 PROCEDURE — 84439 ASSAY OF FREE THYROXINE: CPT

## 2018-10-01 PROCEDURE — 80053 COMPREHEN METABOLIC PANEL: CPT

## 2018-10-01 PROCEDURE — 85025 COMPLETE CBC W/AUTO DIFF WBC: CPT

## 2018-10-02 LAB — HCV AB SERPL QL IA: NEGATIVE

## 2018-10-03 ENCOUNTER — OFFICE VISIT (OUTPATIENT)
Dept: FAMILY MEDICINE | Facility: CLINIC | Age: 73
End: 2018-10-03
Payer: MEDICARE

## 2018-10-03 VITALS
HEART RATE: 72 BPM | SYSTOLIC BLOOD PRESSURE: 132 MMHG | HEIGHT: 70 IN | WEIGHT: 207.13 LBS | DIASTOLIC BLOOD PRESSURE: 68 MMHG | TEMPERATURE: 98 F | BODY MASS INDEX: 29.65 KG/M2

## 2018-10-03 DIAGNOSIS — D64.9 NORMOCYTIC ANEMIA: ICD-10-CM

## 2018-10-03 DIAGNOSIS — E03.8 SUBCLINICAL HYPOTHYROIDISM: ICD-10-CM

## 2018-10-03 DIAGNOSIS — E66.3 OVERWEIGHT (BMI 25.0-29.9): ICD-10-CM

## 2018-10-03 DIAGNOSIS — Z15.89 METHYLENETETRAHYDROFOLATE REDUCTASE (MTHFR) GENE MUTATION: ICD-10-CM

## 2018-10-03 DIAGNOSIS — Z95.5 S/P CORONARY ARTERY STENT PLACEMENT: ICD-10-CM

## 2018-10-03 DIAGNOSIS — Z12.11 COLON CANCER SCREENING: ICD-10-CM

## 2018-10-03 DIAGNOSIS — I10 ESSENTIAL HYPERTENSION: Primary | ICD-10-CM

## 2018-10-03 DIAGNOSIS — G47.33 OSA (OBSTRUCTIVE SLEEP APNEA): ICD-10-CM

## 2018-10-03 DIAGNOSIS — N17.9 AKI (ACUTE KIDNEY INJURY): ICD-10-CM

## 2018-10-03 DIAGNOSIS — E78.2 MIXED HYPERLIPIDEMIA: ICD-10-CM

## 2018-10-03 DIAGNOSIS — E11.9 TYPE 2 DIABETES MELLITUS WITHOUT COMPLICATION, WITHOUT LONG-TERM CURRENT USE OF INSULIN: ICD-10-CM

## 2018-10-03 DIAGNOSIS — T88.7XXA SIDE EFFECT OF MEDICATION: ICD-10-CM

## 2018-10-03 PROCEDURE — 99213 OFFICE O/P EST LOW 20 MIN: CPT | Mod: PBBFAC,PN | Performed by: INTERNAL MEDICINE

## 2018-10-03 PROCEDURE — 1101F PT FALLS ASSESS-DOCD LE1/YR: CPT | Mod: CPTII,,, | Performed by: INTERNAL MEDICINE

## 2018-10-03 PROCEDURE — 3078F DIAST BP <80 MM HG: CPT | Mod: CPTII,,, | Performed by: INTERNAL MEDICINE

## 2018-10-03 PROCEDURE — 99214 OFFICE O/P EST MOD 30 MIN: CPT | Mod: S$PBB,,, | Performed by: INTERNAL MEDICINE

## 2018-10-03 PROCEDURE — 3044F HG A1C LEVEL LT 7.0%: CPT | Mod: CPTII,,, | Performed by: INTERNAL MEDICINE

## 2018-10-03 PROCEDURE — 3075F SYST BP GE 130 - 139MM HG: CPT | Mod: CPTII,,, | Performed by: INTERNAL MEDICINE

## 2018-10-03 PROCEDURE — 99999 PR PBB SHADOW E&M-EST. PATIENT-LVL III: CPT | Mod: PBBFAC,,, | Performed by: INTERNAL MEDICINE

## 2018-10-03 NOTE — PROGRESS NOTES
Subjective:       Patient ID: Jadiel Rossi is a 72 y.o. male.    Chief Complaint: Follow-up    HPI  Here for f/u and to go over his labs. Labs reviewed w pt at length.  HTN: BP avr 138-145/62-66; didn't take his amlodipine for last 4 days as needs to pick it up from pharmacy.   ROBBY : can't tolerate CPAP; weight reduction stressed.  Overweight: has dropped 5 lbs since 2/21/18. Exercises 2x a week; goal 5x a week at 30 min.  Hypothyroidism:not on any meds.  DM2: BS's run low 100's mostly. On metformin 500 mg 2x a day. ; HgA1C 6.3.  S/p coronary art stent: no chest pain, SOBV, or palp. Card is Dr Ward;   AF w recent hospitalization; on eliquis now. On metoprolol. 48 hr Holter on 10/18/18;  MIX HLP; on low fat high fiber diet. On atorvastatin; tolerates it fine.  DEVIN; has been using ibuprofenoccasionally; recommend stopping this as pt on eliquis and use tylenol as needed for pain. Likely reason for GFR dropping to 50.  Labs ordered for f/u. Time: 402 pm- 500 pm; >50% time spent on discussion, counseling, and review.    Review of Systems   Constitutional: Negative for activity change and unexpected weight change.   HENT: Negative for hearing loss, rhinorrhea and trouble swallowing.    Eyes: Negative for discharge and visual disturbance.   Respiratory: Negative for chest tightness and wheezing.    Cardiovascular: Negative for chest pain and palpitations.   Gastrointestinal: Negative for blood in stool, constipation, diarrhea and vomiting.   Endocrine: Negative for polydipsia and polyuria.   Genitourinary: Negative for difficulty urinating, hematuria and urgency.   Musculoskeletal: Negative for arthralgias, joint swelling and neck pain.   Skin: Negative for rash.   Allergic/Immunologic: Negative for environmental allergies and food allergies.   Neurological: Negative for syncope, weakness and headaches.   Hematological: Negative for adenopathy. Does not bruise/bleed easily.   Psychiatric/Behavioral: Negative for  "confusion and dysphoric mood.       Objective:      Vitals:    10/03/18 1550   BP: 132/68   Pulse: 72   Temp: 98.3 °F (36.8 °C)   Weight: 93.9 kg (207 lb 2 oz)   Height: 5' 10" (1.778 m)     Body mass index is 29.72 kg/m².    Physical Exam   Constitutional: He is oriented to person, place, and time. He appears well-developed and well-nourished.   HENT:   Head: Normocephalic and atraumatic.   Eyes: EOM are normal.   Neck: Normal range of motion. Neck supple. No thyromegaly present.   Cardiovascular: Normal rate and regular rhythm. Exam reveals no gallop.   Murmur heard.  MARCO ANTONIO 3/6 aortic.   Pulmonary/Chest: Effort normal and breath sounds normal. No respiratory distress. He has no wheezes. He has no rales.   Abdominal: Soft. Bowel sounds are normal. He exhibits no distension. There is no tenderness. There is no rebound and no guarding.   Musculoskeletal: Normal range of motion. He exhibits no edema.   Lymphadenopathy:     He has no cervical adenopathy.   Neurological: He is alert and oriented to person, place, and time.   Moves all 4 extremities fine.   Skin: No rash noted.   Psychiatric: He has a normal mood and affect. His behavior is normal. Thought content normal.   Vitals reviewed.      Assessment:       1. Essential hypertension    2. ROBBY (obstructive sleep apnea)    3. Type 2 diabetes mellitus without complication, without long-term current use of insulin    4. S/P coronary artery stent placement    5. Subclinical hypothyroidism    6. Mixed hyperlipidemia    7. Overweight (BMI 25.0-29.9)    8. DEVIN (acute kidney injury)    9. Side effect of medication    10. Normocytic anemia    11. Methylenetetrahydrofolate reductase (MTHFR) gene mutation    12. Colon cancer screening        Plan:       Essential hypertension. Maintain < 2 Gm Na a day diet, and monitor BP at home; keep a log. To resume amlodipine tx.    ROBBY (obstructive sleep apnea); weight reduction    Type 2 diabetes mellitus without complication, without " long-term current use of insulin. Maintain a low carb diet; monitor CBG's at home; keep a log for review.  On metformin.    S/P coronary artery stent placement; sees Dr Ward; stable.    Subclinical hypothyroidism; follow TFT's q 6 mos.    Mixed hyperlipidemia. Maintain low fat high fiber diet, exercise regularly. On atorvastatin    Overweight (BMI 25.0-29.9). Caloric restriction w regular exercise and weight reduction.    DEVIN (acute kidney injury); discussed no NSAID agents like aleve/naprosyn, ibuprofen, advil, and motrin. Can use tylenol for pain; keep well hydrated.  -     Basic metabolic panel; Future; Expected date: 10/03/2018  -     Magnesium; Future; Expected date: 10/03/2018    Side effect of medication; suspect NSAID related. No NSAID agents.     Normocytic anemia; conrt MVI daily. iFOBT needed, TC declines.  -     Iron and TIBC; Future; Expected date: 10/03/2018  -     Ferritin; Future; Expected date: 10/03/2018  -     CBC auto differential; Future; Expected date: 10/03/2018  -     Homocysteine, serum; Future; Expected date: 10/03/2018    Methylenetetrahydrofolate reductase (MTHFR) gene mutation; check homocystine levels.     Colon cancer screen: TC declines; check iFOBT stool.

## 2018-10-03 NOTE — PATIENT INSTRUCTIONS
Essential hypertension. Maintain < 2 Gm Na a day diet, and monitor BP at home; keep a log. To resume amlodipine tx.    ROBBY (obstructive sleep apnea); weight reduction    Type 2 diabetes mellitus without complication, without long-term current use of insulin. Maintain a low carb diet; monitor CBG's at home; keep a log for review.  On metformin.    S/P coronary artery stent placement; sees Dr Ward; stable.    Subclinical hypothyroidism; follow TFT's q 6 mos.    Mixed hyperlipidemia. Maintain low fat high fiber diet, exercise regularly. On atorvastatin    Overweight (BMI 25.0-29.9). Caloric restriction w regular exercise and weight reduction.    DEVIN (acute kidney injury); discussed no NSAID agents like aleve/naprosyn, ibuprofen, advil, and motrin. Can use tylenol for pain; keep well hydrated.  -     Basic metabolic panel; Future; Expected date: 10/03/2018  -     Magnesium; Future; Expected date: 10/03/2018    Side effect of medication; suspect NSAID related. No NSAID agents.     Normocytic anemia; conrt MVI daily. iFOBT needed, TC declines.  -     Iron and TIBC; Future; Expected date: 10/03/2018  -     Ferritin; Future; Expected date: 10/03/2018  -     CBC auto differential; Future; Expected date: 10/03/2018  -     Homocysteine, serum; Future; Expected date: 10/03/2018    Methylenetetrahydrofolate reductase (MTHFR) gene mutation; check homocystine levels.

## 2018-10-18 ENCOUNTER — PATIENT OUTREACH (OUTPATIENT)
Dept: ADMINISTRATIVE | Facility: HOSPITAL | Age: 73
End: 2018-10-18

## 2018-10-18 NOTE — LETTER
October 18, 2018    Jadiel Rossi  [address] [place] [address]             [facility]  [address]  [facility] [address]  Phone: [phone] Dear Mr. Rossi:    We have tried to reach you by [facility] email unsuccessfully.    [facility] is committed to your overall health and would like to ensure that you are up to date on your recommended test and/or procedures.   Eliud Chandler MD has found that your chart shows you may be due for the following:    Colon cancer screening    If you have had any of the above done at another facility, please let us know so that we may obtain copies from that facility.  If you have a copy of these records, please provide a copy for us to scan into your chart.  You are welcome to request that the report be faxed to us at  ([phone]).      Otherwise, please schedule these appointments at your earliest convenience by calling [phone] or going to [email or web address].      Sincerely,    Kelley Easley  Clinical Care Coordinator  [facility] [address] [place] [address]  Phone: [phone]   Fax: [phone]

## 2018-11-05 DIAGNOSIS — I10 ESSENTIAL HYPERTENSION: ICD-10-CM

## 2018-11-08 RX ORDER — INDAPAMIDE 1.25 MG/1
TABLET ORAL
Qty: 90 TABLET | Refills: 1 | Status: SHIPPED | OUTPATIENT
Start: 2018-11-08 | End: 2019-04-22 | Stop reason: SDUPTHER

## 2018-11-18 ENCOUNTER — OFFICE VISIT (OUTPATIENT)
Dept: URGENT CARE | Facility: CLINIC | Age: 73
End: 2018-11-18
Payer: MEDICARE

## 2018-11-18 VITALS
WEIGHT: 207 LBS | RESPIRATION RATE: 16 BRPM | BODY MASS INDEX: 29.63 KG/M2 | HEIGHT: 70 IN | SYSTOLIC BLOOD PRESSURE: 187 MMHG | DIASTOLIC BLOOD PRESSURE: 63 MMHG | OXYGEN SATURATION: 95 % | TEMPERATURE: 98 F | HEART RATE: 87 BPM

## 2018-11-18 DIAGNOSIS — R09.81 SINUS CONGESTION: Primary | ICD-10-CM

## 2018-11-18 DIAGNOSIS — B34.9 VIRAL ILLNESS: ICD-10-CM

## 2018-11-18 PROCEDURE — 3077F SYST BP >= 140 MM HG: CPT | Mod: CPTII,S$GLB,, | Performed by: PHYSICIAN ASSISTANT

## 2018-11-18 PROCEDURE — 99214 OFFICE O/P EST MOD 30 MIN: CPT | Mod: 25,S$GLB,, | Performed by: PHYSICIAN ASSISTANT

## 2018-11-18 PROCEDURE — 1101F PT FALLS ASSESS-DOCD LE1/YR: CPT | Mod: CPTII,S$GLB,, | Performed by: PHYSICIAN ASSISTANT

## 2018-11-18 PROCEDURE — 3078F DIAST BP <80 MM HG: CPT | Mod: CPTII,S$GLB,, | Performed by: PHYSICIAN ASSISTANT

## 2018-11-18 PROCEDURE — 96372 THER/PROPH/DIAG INJ SC/IM: CPT | Mod: S$GLB,,, | Performed by: PHYSICIAN ASSISTANT

## 2018-11-18 RX ORDER — BETAMETHASONE SODIUM PHOSPHATE AND BETAMETHASONE ACETATE 3; 3 MG/ML; MG/ML
6 INJECTION, SUSPENSION INTRA-ARTICULAR; INTRALESIONAL; INTRAMUSCULAR; SOFT TISSUE
Status: COMPLETED | OUTPATIENT
Start: 2018-11-18 | End: 2018-11-18

## 2018-11-18 RX ORDER — BENZONATATE 200 MG/1
200 CAPSULE ORAL 3 TIMES DAILY PRN
Qty: 60 CAPSULE | Refills: 1 | Status: SHIPPED | OUTPATIENT
Start: 2018-11-18 | End: 2018-11-28

## 2018-11-18 RX ORDER — FLUTICASONE PROPIONATE 50 MCG
1 SPRAY, SUSPENSION (ML) NASAL 2 TIMES DAILY PRN
Qty: 1 BOTTLE | Refills: 1 | Status: SHIPPED | OUTPATIENT
Start: 2018-11-18 | End: 2019-08-27

## 2018-11-18 RX ORDER — PROMETHAZINE HYDROCHLORIDE AND DEXTROMETHORPHAN HYDROBROMIDE 6.25; 15 MG/5ML; MG/5ML
5 SYRUP ORAL NIGHTLY PRN
Qty: 180 ML | Refills: 1 | Status: SHIPPED | OUTPATIENT
Start: 2018-11-18 | End: 2018-11-28

## 2018-11-18 RX ADMIN — BETAMETHASONE SODIUM PHOSPHATE AND BETAMETHASONE ACETATE 6 MG: 3; 3 INJECTION, SUSPENSION INTRA-ARTICULAR; INTRALESIONAL; INTRAMUSCULAR; SOFT TISSUE at 02:11

## 2018-11-18 NOTE — PATIENT INSTRUCTIONS
"NASAL ALLERGY    Nasal Allergy, also called "Allergic Rhinitis" occurs after exposure to pollen, molds, mildew, animal "dander" (scales from animal skin, hair and feathers), dust, smoke and fumes. (These are called "allergens"). When pollen causes a nasal allergy it is commonly called "Hay Fever".    When these particles contact the lining of the nose, eyes, eyelids, sinuses or throat, they cause the cells to release a chemical called "histamine". Histamine may cause a watery discharge from the eyes or nose. It may also cause violent sneezing, nasal congestion, itching of the eyes, nose, throat and mouth.    PREVENTION:    Nasal allergy cannot be cured but symptoms can be reduced. Avoid or reduce exposure to the allergen when possible.    HOME CARE:    1) DECONGESTANT pills and sprays (Sudafed, NeoSynephrine), reduce tissue swelling and watery discharge. Overuse of nasal decongestant sprays may make symptoms worse, ESPECIALLY IF YOU HAVE HIGH BLOOD PRESSURE. Do not use these more often than recommended. Get an over the counter Nasal Saline spray to supplement Flonase    2) ANTIHISTAMINES block the release of histamine during the allergic response. Antihistamines are more effective when taken BEFORE symptoms develop. Unless a prescription antihistamine was prescribed, you may take CLARITIN (loratadine). (Claritin is an over-the-counter antihistamine that does not cause drowsiness.)    3) STEROID nasal sprays (Beconase, Vancenase, Nasalide, Nasocort, Flonase) or oral steroids (Prednisone) may also be prescribed for more severe symptoms. These help to reduce the local inflammation which adds to the allergic response.    4) If you have ASTHMA, pollen season may make your asthma symptoms worse. It is important that you use your asthma medicines as directed during this time to prevent or treat attacks. Some persons with asthma have a worsening of their asthma symptoms when taking antihistamines. If you notice this, stop " the antihistamines and notify your doctor.    5) Consider a Chitto Rhino Nasal and Sinus Rinse 2-3 times/week if your symptoms are chronic. (https://chitorhino.com)    You received a steroid shot today - this can elevate your blood pressure, elevate your blood sugar, water weight gain, nervous energy, redness to the face and dimpling of the skin where the shot goes in.     If you were prescribed a narcotic medication, do not drive or operate heavy equipment or machinery while taking these medications.  You must understand that you've received an Urgent Care treatment only and that you may be released before all your medical problems are known or treated. You, the patient, will arrange for follow up care as instructed.  Follow up with your PCP or specialty clinic as directed in the next 1-2 weeks if not improved or as needed.  You can call [phone] to schedule an appointment with the appropriate provider.  If your condition worsens we recommend that you receive another evaluation at the emergency room immediately or contact your primary medical clinics after hours call service to discuss your concerns.  Please return here or go to the Emergency Department for any concerns or worsening of condition.

## 2018-11-18 NOTE — PROGRESS NOTES
"Subjective:       Patient ID: Jadiel Rossi is a 72 y.o. male.    Vitals:  height is 5' 10" (1.778 m) and weight is 93.9 kg (207 lb). His temperature is 98.1 °F (36.7 °C). His blood pressure is 187/63 (abnormal) and his pulse is 87. His respiration is 16 and oxygen saturation is 95%.     Chief Complaint: Sinus Problem    Sinus Problem   This is a new problem. The current episode started in the past 7 days. The problem is unchanged. Associated symptoms include congestion, coughing and sinus pressure. Pertinent negatives include no chills, diaphoresis, ear pain, shortness of breath or sore throat.       Constitution: Negative for chills, sweating, fatigue and fever.   HENT: Positive for congestion and sinus pressure. Negative for ear pain, sinus pain, sore throat and voice change.    Neck: Negative for painful lymph nodes.   Eyes: Negative for eye redness.   Respiratory: Positive for cough. Negative for chest tightness, sputum production, bloody sputum, COPD, shortness of breath, stridor, wheezing and asthma.    Gastrointestinal: Negative for nausea and vomiting.   Musculoskeletal: Negative for muscle ache.   Skin: Negative for rash.   Allergic/Immunologic: Negative for seasonal allergies and asthma.   Hematologic/Lymphatic: Negative for swollen lymph nodes.       Objective:      Physical Exam   Constitutional: He is oriented to person, place, and time. He appears well-developed and well-nourished. He is cooperative.  Non-toxic appearance. He does not appear ill. No distress.   HENT:   Head: Normocephalic and atraumatic.   Right Ear: Hearing, tympanic membrane, external ear and ear canal normal.   Left Ear: Hearing, tympanic membrane, external ear and ear canal normal.   Nose: Nose normal. No mucosal edema, rhinorrhea or nasal deformity. No epistaxis. Right sinus exhibits no maxillary sinus tenderness and no frontal sinus tenderness. Left sinus exhibits no maxillary sinus tenderness and no frontal sinus tenderness. "   Mouth/Throat: Uvula is midline and mucous membranes are normal. No trismus in the jaw. Normal dentition. No uvula swelling. Posterior oropharyngeal erythema present.   Eyes: Conjunctivae and lids are normal. No scleral icterus.   Sclera clear bilat   Neck: Trachea normal, full passive range of motion without pain and phonation normal. Neck supple.   Cardiovascular: Normal rate, regular rhythm, normal heart sounds, intact distal pulses and normal pulses.   Pulmonary/Chest: Effort normal and breath sounds normal. No respiratory distress.   Abdominal: Soft. Normal appearance and bowel sounds are normal. He exhibits no distension. There is no tenderness.   Musculoskeletal: Normal range of motion. He exhibits no edema or deformity.   Neurological: He is alert and oriented to person, place, and time. He exhibits normal muscle tone. Coordination normal.   Skin: Skin is warm, dry and intact. He is not diaphoretic. No pallor.   Psychiatric: He has a normal mood and affect. His speech is normal and behavior is normal. Judgment and thought content normal. Cognition and memory are normal.   Nursing note and vitals reviewed.      Assessment:       1. Sinus congestion    2. Viral illness        Plan:         Sinus congestion  -     betamethasone acetate-betamethasone sodium phosphate injection 6 mg  -     benzonatate (TESSALON) 200 MG capsule; Take 1 capsule (200 mg total) by mouth 3 (three) times daily as needed for Cough.  Dispense: 60 capsule; Refill: 1  -     promethazine-dextromethorphan (PROMETHAZINE-DM) 6.25-15 mg/5 mL Syrp; Take 5 mLs by mouth nightly as needed.  Dispense: 180 mL; Refill: 1  -     fluticasone (FLONASE) 50 mcg/actuation nasal spray; 1 spray (50 mcg total) by Each Nare route 2 (two) times daily as needed for Rhinitis or Allergies.  Dispense: 1 Bottle; Refill: 1  -     sodium chloride (OCEAN NASAL) 0.65 % nasal spray; 1 spray by Nasal route as needed for Congestion.  Dispense: 45 mL; Refill: 3    Viral  "illness  -     betamethasone acetate-betamethasone sodium phosphate injection 6 mg  -     benzonatate (TESSALON) 200 MG capsule; Take 1 capsule (200 mg total) by mouth 3 (three) times daily as needed for Cough.  Dispense: 60 capsule; Refill: 1  -     promethazine-dextromethorphan (PROMETHAZINE-DM) 6.25-15 mg/5 mL Syrp; Take 5 mLs by mouth nightly as needed.  Dispense: 180 mL; Refill: 1  -     fluticasone (FLONASE) 50 mcg/actuation nasal spray; 1 spray (50 mcg total) by Each Nare route 2 (two) times daily as needed for Rhinitis or Allergies.  Dispense: 1 Bottle; Refill: 1  -     sodium chloride (OCEAN NASAL) 0.65 % nasal spray; 1 spray by Nasal route as needed for Congestion.  Dispense: 45 mL; Refill: 3      Patient Instructions   NASAL ALLERGY    Nasal Allergy, also called "Allergic Rhinitis" occurs after exposure to pollen, molds, mildew, animal "dander" (scales from animal skin, hair and feathers), dust, smoke and fumes. (These are called "allergens"). When pollen causes a nasal allergy it is commonly called "Hay Fever".    When these particles contact the lining of the nose, eyes, eyelids, sinuses or throat, they cause the cells to release a chemical called "histamine". Histamine may cause a watery discharge from the eyes or nose. It may also cause violent sneezing, nasal congestion, itching of the eyes, nose, throat and mouth.    PREVENTION:    Nasal allergy cannot be cured but symptoms can be reduced. Avoid or reduce exposure to the allergen when possible.    HOME CARE:    1) DECONGESTANT pills and sprays (Sudafed, NeoSynephrine), reduce tissue swelling and watery discharge. Overuse of nasal decongestant sprays may make symptoms worse, ESPECIALLY IF YOU HAVE HIGH BLOOD PRESSURE. Do not use these more often than recommended. Get an over the counter Nasal Saline spray to supplement Flonase    2) ANTIHISTAMINES block the release of histamine during the allergic response. Antihistamines are more effective when " taken BEFORE symptoms develop. Unless a prescription antihistamine was prescribed, you may take CLARITIN (loratadine). (Claritin is an over-the-counter antihistamine that does not cause drowsiness.)    3) STEROID nasal sprays (Beconase, Vancenase, Nasalide, Nasocort, Flonase) or oral steroids (Prednisone) may also be prescribed for more severe symptoms. These help to reduce the local inflammation which adds to the allergic response.    4) If you have ASTHMA, pollen season may make your asthma symptoms worse. It is important that you use your asthma medicines as directed during this time to prevent or treat attacks. Some persons with asthma have a worsening of their asthma symptoms when taking antihistamines. If you notice this, stop the antihistamines and notify your doctor.    5) Consider a Chitto Rhino Nasal and Sinus Rinse 2-3 times/week if your symptoms are chronic. (https://chitorhino.[email or web address])    You received a steroid shot today - this can elevate your blood pressure, elevate your blood sugar, water weight gain, nervous energy, redness to the face and dimpling of the skin where the shot goes in.     If you were prescribed a narcotic medication, do not drive or operate heavy equipment or machinery while taking these medications.  You must understand that you've received an Urgent Care treatment only and that you may be released before all your medical problems are known or treated. You, the patient, will arrange for follow up care as instructed.  Follow up with your PCP or specialty clinic as directed in the next 1-2 weeks if not improved or as needed.  You can call [phone] to schedule an appointment with the appropriate provider.  If your condition worsens we recommend that you receive another evaluation at the emergency room immediately or contact your primary medical clinics after hours call service to discuss your concerns.  Please return here or go to the Emergency Department for any concerns or  worsening of condition.

## 2018-11-21 ENCOUNTER — TELEPHONE (OUTPATIENT)
Dept: URGENT CARE | Facility: CLINIC | Age: 73
End: 2018-11-21

## 2018-11-27 DIAGNOSIS — E78.2 MIXED HYPERLIPIDEMIA: ICD-10-CM

## 2018-11-27 DIAGNOSIS — Z95.5 S/P CORONARY ARTERY STENT PLACEMENT: ICD-10-CM

## 2018-11-27 RX ORDER — ATORVASTATIN CALCIUM 40 MG/1
TABLET, FILM COATED ORAL
Qty: 90 TABLET | Refills: 0 | Status: SHIPPED | OUTPATIENT
Start: 2018-11-27 | End: 2019-02-24 | Stop reason: SDUPTHER

## 2018-11-28 ENCOUNTER — PATIENT OUTREACH (OUTPATIENT)
Dept: ADMINISTRATIVE | Facility: HOSPITAL | Age: 73
End: 2018-11-28

## 2018-11-28 ENCOUNTER — PATIENT MESSAGE (OUTPATIENT)
Dept: ADMINISTRATIVE | Facility: HOSPITAL | Age: 73
End: 2018-11-28

## 2019-01-28 NOTE — TELEPHONE ENCOUNTER
Pt Of Eliud Chandler MD    Last seen on: 10-3-18    Next appt: 2-14-19    Last refill: 4-16-18    Allergies: Review of patient's allergies indicates:  No Known Allergies    Pharmacy:   [facility] [address] [place] [address] AT [address] [id number] [facility] [address]  [facility] [address]  Phone: [phone] Fax: [phone]    Labs: Please review.    Lab Results   Component Value Date     10/01/2018    BUN 30 (H) 10/01/2018    CREATININE 1.4 10/01/2018    TSH 3.111 10/01/2018    WBC 9.84 10/01/2018     Lab Results   Component Value Date     10/01/2018    K 4.4 10/01/2018     10/01/2018    CO2 24 10/01/2018         Please review! Thank you!

## 2019-01-29 RX ORDER — LISINOPRIL 20 MG/1
TABLET ORAL
Qty: 180 TABLET | Refills: 1 | Status: SHIPPED | OUTPATIENT
Start: 2019-01-29 | End: 2019-02-14

## 2019-01-31 ENCOUNTER — PATIENT OUTREACH (OUTPATIENT)
Dept: ADMINISTRATIVE | Facility: HOSPITAL | Age: 74
End: 2019-01-31

## 2019-01-31 ENCOUNTER — TELEPHONE (OUTPATIENT)
Dept: FAMILY MEDICINE | Facility: CLINIC | Age: 74
End: 2019-01-31

## 2019-01-31 NOTE — TELEPHONE ENCOUNTER
Documentation:  Received fecal immunochemical test (fit) from 12/20/2018 collection date which was positive.  I researched patient's chart; colonoscopy has been overdue since 12/31/1996.  Called patient by phone; left a voicemail message.  Express the need for him to see his GI doctor to update his colonoscopy with a fecal immunochemical testing positive for blood; which is a risk factor for colorectal cancer.  Patient to call the office;    Please try to reach patient by phone tomorrow to notify him of the results and find out who his GI doctor is and that with  immunochemical fecal testing positive, he needs to obtain a colonoscopy as soon as possible from his GI MD for further evaluation

## 2019-01-31 NOTE — LETTER
February 8, 2019    Jadiel ARANDA Flo  [address] [place] [address]             [facility]  [address]  [facility] [address]  Phone: [phone] Dear Mr. Rossi:    We have tried to reach you by mychart unsuccessfully.      [facility] is committed to your overall health.  To help you get the most out of each of your visits, we will review your information to make sure you are up to date on all of your recommended tests and or procedures.       Dr. Eliud Chandler MD has found that you may be due for:     Shingles vaccine   Colon cancer screening      After reviewing your chart, it has been documented that a FIT KIT (colorectal cancer screening kit) was ordered on 10/3/18. As of today it has not been dispensed to you.     If you have had any of the above done at another facility, please bring the records or information with you so that your record at [facility] will be complete and up to date.     If you have not had any of these tests or procedures done recently and would like to complete this testing before your appointment on 2/14/19 at 3:40 pm with Eliud Chandler MD please call [phone] or send a message through your [facility] portal to your provider's office.     If you are currently taking medication, please bring it with you to your appointment for review.     Also, if you have any type of Advanced Directives, please bring them with you to your office visit so we may scan them into your chart.     Please feel free to call the number listed below if you have any questions.     Thanks,     Jae Rodriguez LPN Clinical Care Coordinator   [facility]   [address] [facility] [address]   [place] [address]   [phone] (p)   [phone] (f)

## 2019-02-01 NOTE — TELEPHONE ENCOUNTER
Tried to reach patient again this morning at about 817 regarding a positive fecal immunochemical test and his need to see a GI physicians regarding a total colonoscopy evaluation left message on his voicemail again.  Of the positive results and his need to see a GI physician.

## 2019-02-01 NOTE — TELEPHONE ENCOUNTER
----- Message from Mirela Hdez sent at 2/1/2019  3:07 PM CST -----  Contact: self  Patient [phone] is returning call to Nurse from this afternoon/please call

## 2019-02-01 NOTE — TELEPHONE ENCOUNTER
I have attempted without success to contact this patient by phone to discuss lab results, will try again later and I left a message on answering machine.

## 2019-02-12 ENCOUNTER — LAB VISIT (OUTPATIENT)
Dept: LAB | Facility: HOSPITAL | Age: 74
End: 2019-02-12
Attending: INTERNAL MEDICINE
Payer: MEDICARE

## 2019-02-12 DIAGNOSIS — D64.9 NORMOCYTIC ANEMIA: ICD-10-CM

## 2019-02-12 DIAGNOSIS — Z15.89 METHYLENETETRAHYDROFOLATE REDUCTASE (MTHFR) GENE MUTATION: ICD-10-CM

## 2019-02-12 DIAGNOSIS — N17.9 AKI (ACUTE KIDNEY INJURY): ICD-10-CM

## 2019-02-12 LAB
ANION GAP SERPL CALC-SCNC: 10 MMOL/L
BASOPHILS # BLD AUTO: 0.07 K/UL
BASOPHILS NFR BLD: 0.7 %
BUN SERPL-MCNC: 23 MG/DL
CALCIUM SERPL-MCNC: 10.3 MG/DL
CHLORIDE SERPL-SCNC: 100 MMOL/L
CO2 SERPL-SCNC: 30 MMOL/L
CREAT SERPL-MCNC: 1.2 MG/DL
DIFFERENTIAL METHOD: ABNORMAL
EOSINOPHIL # BLD AUTO: 0.9 K/UL
EOSINOPHIL NFR BLD: 9.1 %
ERYTHROCYTE [DISTWIDTH] IN BLOOD BY AUTOMATED COUNT: 13.6 %
EST. GFR  (AFRICAN AMERICAN): >60 ML/MIN/1.73 M^2
EST. GFR  (NON AFRICAN AMERICAN): >60 ML/MIN/1.73 M^2
FERRITIN SERPL-MCNC: 318 NG/ML
FOLATE SERPL-MCNC: 8.2 NG/ML
GLUCOSE SERPL-MCNC: 110 MG/DL
HCT VFR BLD AUTO: 38.2 %
HCYS SERPL-SCNC: 11.9 UMOL/L
HGB BLD-MCNC: 11.9 G/DL
IMM GRANULOCYTES # BLD AUTO: 0.03 K/UL
IMM GRANULOCYTES NFR BLD AUTO: 0.3 %
IRON SERPL-MCNC: 53 UG/DL
LYMPHOCYTES # BLD AUTO: 2.3 K/UL
LYMPHOCYTES NFR BLD: 23.8 %
MAGNESIUM SERPL-MCNC: 1.6 MG/DL
MCH RBC QN AUTO: 28.7 PG
MCHC RBC AUTO-ENTMCNC: 31.2 G/DL
MCV RBC AUTO: 92 FL
MONOCYTES # BLD AUTO: 0.9 K/UL
MONOCYTES NFR BLD: 8.8 %
NEUTROPHILS # BLD AUTO: 5.6 K/UL
NEUTROPHILS NFR BLD: 57.3 %
NRBC BLD-RTO: 0 /100 WBC
PLATELET # BLD AUTO: 243 K/UL
PMV BLD AUTO: 11 FL
POTASSIUM SERPL-SCNC: 4.6 MMOL/L
RBC # BLD AUTO: 4.14 M/UL
SATURATED IRON: 14 %
SODIUM SERPL-SCNC: 140 MMOL/L
TOTAL IRON BINDING CAPACITY: 369 UG/DL
TRANSFERRIN SERPL-MCNC: 249 MG/DL
VIT B12 SERPL-MCNC: 478 PG/ML
WBC # BLD AUTO: 9.81 K/UL

## 2019-02-12 PROCEDURE — 36415 COLL VENOUS BLD VENIPUNCTURE: CPT | Mod: HCNC,PN

## 2019-02-12 PROCEDURE — 83735 ASSAY OF MAGNESIUM: CPT | Mod: HCNC

## 2019-02-12 PROCEDURE — 85025 COMPLETE CBC W/AUTO DIFF WBC: CPT | Mod: HCNC

## 2019-02-12 PROCEDURE — 82728 ASSAY OF FERRITIN: CPT | Mod: HCNC

## 2019-02-12 PROCEDURE — 82607 VITAMIN B-12: CPT | Mod: HCNC

## 2019-02-12 PROCEDURE — 80048 BASIC METABOLIC PNL TOTAL CA: CPT | Mod: HCNC

## 2019-02-12 PROCEDURE — 82746 ASSAY OF FOLIC ACID SERUM: CPT | Mod: HCNC

## 2019-02-12 PROCEDURE — 83090 ASSAY OF HOMOCYSTEINE: CPT | Mod: HCNC

## 2019-02-12 PROCEDURE — 83540 ASSAY OF IRON: CPT | Mod: HCNC

## 2019-02-14 ENCOUNTER — OFFICE VISIT (OUTPATIENT)
Dept: FAMILY MEDICINE | Facility: CLINIC | Age: 74
End: 2019-02-14
Payer: MEDICARE

## 2019-02-14 ENCOUNTER — OFFICE VISIT (OUTPATIENT)
Dept: GASTROENTEROLOGY | Facility: CLINIC | Age: 74
End: 2019-02-14
Payer: MEDICARE

## 2019-02-14 VITALS
TEMPERATURE: 99 F | BODY MASS INDEX: 30.3 KG/M2 | SYSTOLIC BLOOD PRESSURE: 142 MMHG | WEIGHT: 211.63 LBS | OXYGEN SATURATION: 95 % | DIASTOLIC BLOOD PRESSURE: 60 MMHG | HEART RATE: 78 BPM | HEIGHT: 70 IN | RESPIRATION RATE: 16 BRPM

## 2019-02-14 VITALS
WEIGHT: 216.25 LBS | HEART RATE: 69 BPM | BODY MASS INDEX: 30.96 KG/M2 | HEIGHT: 70 IN | SYSTOLIC BLOOD PRESSURE: 153 MMHG | DIASTOLIC BLOOD PRESSURE: 59 MMHG

## 2019-02-14 DIAGNOSIS — R03.0 ELEVATED BLOOD PRESSURE READING: ICD-10-CM

## 2019-02-14 DIAGNOSIS — Z95.5 S/P CORONARY ARTERY STENT PLACEMENT: ICD-10-CM

## 2019-02-14 DIAGNOSIS — E11.9 TYPE 2 DIABETES MELLITUS WITHOUT COMPLICATION, WITHOUT LONG-TERM CURRENT USE OF INSULIN: ICD-10-CM

## 2019-02-14 DIAGNOSIS — K43.9 VENTRAL HERNIA WITHOUT OBSTRUCTION OR GANGRENE: ICD-10-CM

## 2019-02-14 DIAGNOSIS — R12 HEARTBURN: ICD-10-CM

## 2019-02-14 DIAGNOSIS — Z86.2 HISTORY OF ANEMIA: ICD-10-CM

## 2019-02-14 DIAGNOSIS — Z79.01 ANTICOAGULANT LONG-TERM USE: ICD-10-CM

## 2019-02-14 DIAGNOSIS — I10 ESSENTIAL HYPERTENSION: Primary | ICD-10-CM

## 2019-02-14 DIAGNOSIS — R19.5 POSITIVE FIT (FECAL IMMUNOCHEMICAL TEST): Primary | ICD-10-CM

## 2019-02-14 DIAGNOSIS — E66.09 CLASS 1 OBESITY DUE TO EXCESS CALORIES WITH SERIOUS COMORBIDITY AND BODY MASS INDEX (BMI) OF 30.0 TO 30.9 IN ADULT: ICD-10-CM

## 2019-02-14 DIAGNOSIS — I48.91 ATRIAL FIBRILLATION, UNSPECIFIED TYPE: ICD-10-CM

## 2019-02-14 DIAGNOSIS — Z87.898 HISTORY OF DIARRHEA: ICD-10-CM

## 2019-02-14 DIAGNOSIS — D64.9 NORMOCYTIC ANEMIA: ICD-10-CM

## 2019-02-14 DIAGNOSIS — E03.9 BORDERLINE HYPOTHYROIDISM: ICD-10-CM

## 2019-02-14 DIAGNOSIS — G47.33 OSA (OBSTRUCTIVE SLEEP APNEA): ICD-10-CM

## 2019-02-14 DIAGNOSIS — E78.2 MIXED HYPERLIPIDEMIA: ICD-10-CM

## 2019-02-14 PROCEDURE — 1101F PR PT FALLS ASSESS DOC 0-1 FALLS W/OUT INJ PAST YR: ICD-10-PCS | Mod: HCNC,CPTII,S$GLB, | Performed by: INTERNAL MEDICINE

## 2019-02-14 PROCEDURE — 1101F PT FALLS ASSESS-DOCD LE1/YR: CPT | Mod: HCNC,CPTII,S$GLB, | Performed by: INTERNAL MEDICINE

## 2019-02-14 PROCEDURE — 99214 PR OFFICE/OUTPT VISIT, EST, LEVL IV, 30-39 MIN: ICD-10-PCS | Mod: HCNC,S$GLB,, | Performed by: INTERNAL MEDICINE

## 2019-02-14 PROCEDURE — 99999 PR PBB SHADOW E&M-EST. PATIENT-LVL III: ICD-10-PCS | Mod: PBBFAC,HCNC,, | Performed by: INTERNAL MEDICINE

## 2019-02-14 PROCEDURE — 99214 OFFICE O/P EST MOD 30 MIN: CPT | Mod: S$GLB,,, | Performed by: NURSE PRACTITIONER

## 2019-02-14 PROCEDURE — 1101F PR PT FALLS ASSESS DOC 0-1 FALLS W/OUT INJ PAST YR: ICD-10-PCS | Mod: CPTII,S$GLB,, | Performed by: NURSE PRACTITIONER

## 2019-02-14 PROCEDURE — 3078F PR MOST RECENT DIASTOLIC BLOOD PRESSURE < 80 MM HG: ICD-10-PCS | Mod: HCNC,CPTII,S$GLB, | Performed by: INTERNAL MEDICINE

## 2019-02-14 PROCEDURE — 3077F PR MOST RECENT SYSTOLIC BLOOD PRESSURE >= 140 MM HG: ICD-10-PCS | Mod: HCNC,CPTII,S$GLB, | Performed by: INTERNAL MEDICINE

## 2019-02-14 PROCEDURE — 99999 PR PBB SHADOW E&M-EST. PATIENT-LVL V: ICD-10-PCS | Mod: PBBFAC,,, | Performed by: NURSE PRACTITIONER

## 2019-02-14 PROCEDURE — 3077F SYST BP >= 140 MM HG: CPT | Mod: CPTII,S$GLB,, | Performed by: NURSE PRACTITIONER

## 2019-02-14 PROCEDURE — 3077F SYST BP >= 140 MM HG: CPT | Mod: HCNC,CPTII,S$GLB, | Performed by: INTERNAL MEDICINE

## 2019-02-14 PROCEDURE — 99214 PR OFFICE/OUTPT VISIT, EST, LEVL IV, 30-39 MIN: ICD-10-PCS | Mod: S$GLB,,, | Performed by: NURSE PRACTITIONER

## 2019-02-14 PROCEDURE — 3078F PR MOST RECENT DIASTOLIC BLOOD PRESSURE < 80 MM HG: ICD-10-PCS | Mod: CPTII,S$GLB,, | Performed by: NURSE PRACTITIONER

## 2019-02-14 PROCEDURE — 3078F DIAST BP <80 MM HG: CPT | Mod: CPTII,S$GLB,, | Performed by: NURSE PRACTITIONER

## 2019-02-14 PROCEDURE — 3044F HG A1C LEVEL LT 7.0%: CPT | Mod: HCNC,CPTII,S$GLB, | Performed by: INTERNAL MEDICINE

## 2019-02-14 PROCEDURE — 3078F DIAST BP <80 MM HG: CPT | Mod: HCNC,CPTII,S$GLB, | Performed by: INTERNAL MEDICINE

## 2019-02-14 PROCEDURE — 3044F PR MOST RECENT HEMOGLOBIN A1C LEVEL <7.0%: ICD-10-PCS | Mod: HCNC,CPTII,S$GLB, | Performed by: INTERNAL MEDICINE

## 2019-02-14 PROCEDURE — 1101F PT FALLS ASSESS-DOCD LE1/YR: CPT | Mod: CPTII,S$GLB,, | Performed by: NURSE PRACTITIONER

## 2019-02-14 PROCEDURE — 99214 OFFICE O/P EST MOD 30 MIN: CPT | Mod: HCNC,S$GLB,, | Performed by: INTERNAL MEDICINE

## 2019-02-14 PROCEDURE — 99999 PR PBB SHADOW E&M-EST. PATIENT-LVL III: CPT | Mod: PBBFAC,HCNC,, | Performed by: INTERNAL MEDICINE

## 2019-02-14 PROCEDURE — 99999 PR PBB SHADOW E&M-EST. PATIENT-LVL V: CPT | Mod: PBBFAC,,, | Performed by: NURSE PRACTITIONER

## 2019-02-14 PROCEDURE — 3077F PR MOST RECENT SYSTOLIC BLOOD PRESSURE >= 140 MM HG: ICD-10-PCS | Mod: CPTII,S$GLB,, | Performed by: NURSE PRACTITIONER

## 2019-02-14 RX ORDER — CALCIUM CARBONATE 500(1250)
1 TABLET,CHEWABLE ORAL DAILY PRN
Status: ON HOLD | COMMUNITY
End: 2019-05-01

## 2019-02-14 RX ORDER — AMLODIPINE BESYLATE 5 MG/1
5 TABLET ORAL 2 TIMES DAILY
COMMUNITY
End: 2020-05-26 | Stop reason: SDUPTHER

## 2019-02-14 RX ORDER — TELMISARTAN 40 MG/1
40 TABLET ORAL DAILY
Qty: 90 TABLET | Refills: 1 | Status: SHIPPED | OUTPATIENT
Start: 2019-02-14 | End: 2019-08-11 | Stop reason: SDUPTHER

## 2019-02-14 NOTE — LETTER
February 18, 2019      Eliud Chandler MD  [address]  [place] [address]           [place] [facility] [address] [facility] [address] [place] [address]  Phone: [phone]          Patient: Jadiel Rossi   MR Number: [id number]   YOB: 1945   Date of Visit: 2/14/2019       Dear Dr. Eliud Chandler:    Thank you for referring Jadiel Rossi to me for evaluation. Attached you will find relevant portions of my assessment and plan of care.    If you have questions, please do not hesitate to call me. I look forward to following Jadiel Rossi along with you.    Sincerely,    Wing Woodward  CC:  No Recipients    If you would like to receive this communication electronically, please contact [email or web address] or [phone] to request more information on [facility] access.    For providers and/or their staff who would like to refer a patient to [facility], please contact us through our one-stop-shop provider referral line, Cristine Mackey, at [phone].    If you feel you have received this communication in error or would no longer like to receive these types of communications, please e-mail [email or web address]

## 2019-02-14 NOTE — PROGRESS NOTES
"Subjective:       Patient ID: Jadiel Rossi is a 73 y.o. male Body mass index is 31.03 kg/m².    Chief Complaint: Anemia    This patient is new to me.  Referring Provider:  Dr. Chandler for colon cancer screening.    Patient seen for colorectal cancer screening, average risk, age appropriate, first occurrence, with associated signs/symptoms of occult positive stool (see ROS). Denies family history of colon cancer/polyps.    Reviewed telephone encounter 1/31/19 from PCP: "Documentation:  Received fecal immunochemical test (fit) from 12/20/2018 collection date which was positive.  I researched patient's chart; colonoscopy has been overdue since 12/31/1996.  Called patient by phone; left a voicemail message.  Express the need for him to see his GI doctor to update his colonoscopy with a fecal immunochemical testing positive for blood; which is a risk factor for colorectal cancer.  Patient to call the office;  Please try to reach patient by phone tomorrow to notify him of the results and find out who his GI doctor is and that with  immunochemical fecal testing positive, he needs to obtain a colonoscopy as soon as possible from his GI MD for further evaluation"      GI Problem   The primary symptoms include diarrhea (occasional; occasional after eating greasy food or subway sandwiches; otherwise bowel movements once daily of formed stool, denies currently). Primary symptoms do not include fever, weight loss, fatigue, abdominal pain, nausea, vomiting, melena, hematemesis, hematochezia (not visible to patient but positive stool study) or dysuria.   The illness is also significant for bloating (occasional). The illness does not include chills, anorexia, dysphagia, odynophagia or constipation. Significant associated medical issues include GERD (tums prn- not taken often). Associated medical issues do not include inflammatory bowel disease.     Review of Systems   Constitutional: Negative for appetite change, chills, " fatigue, fever and weight loss.   HENT: Negative for sore throat and trouble swallowing.    Respiratory: Negative for cough, choking and shortness of breath.    Cardiovascular: Negative for chest pain.   Gastrointestinal: Positive for bloating (occasional) and diarrhea (occasional; occasional after eating greasy food or subway sandwiches; otherwise bowel movements once daily of formed stool, denies currently). Negative for abdominal pain, anal bleeding, anorexia, blood in stool (not visible to patient, but occult positive), constipation, dysphagia, hematemesis, hematochezia (not visible to patient but positive stool study), melena, nausea, rectal pain and vomiting.   Genitourinary: Negative for difficulty urinating, dysuria and flank pain.   Neurological: Negative for weakness.       Past Medical History:   Diagnosis Date    Anemia     Anxiety     Atrial fibrillation     [facility] to [facility] for AF w RVR; no cardioversion needed; Dr Mendes EPS. Slowed down on its own and back to NSR; placed on eliquis.     Carotid artery plaque     bilateral    Diabetes mellitus, type 2     Essential hypertension     Hypertension     Hypothyroidism     Liver dysfunction     Methylenetetrahydrofolate reductase (MTHFR) gene mutation     Mixed hyperlipidemia     Obesity     ROBBY (obstructive sleep apnea)     Proteinuria     Valvular heart disease      Past Surgical History:   Procedure Laterality Date    APPENDECTOMY  2312-3067    CORONARY STENT PLACEMENT  2017    LAD by Dr Ward     Family History   Problem Relation Age of Onset    Cancer Mother     Heart disease Mother     Heart disease Father     Arthritis Father     Hypertension Father     Cancer Sister         breast cancer    Diabetes Sister     Asthma Brother     COPD Brother     Diabetes Brother     Stroke Brother     Cancer Sister         leukemia    Early death Sister          at 64 of leukemia    Colon cancer Neg Hx     Crohn's  disease Neg Hx     Esophageal cancer Neg Hx     Ulcerative colitis Neg Hx     Stomach cancer Neg Hx      Wt Readings from Last 10 Encounters:   02/14/19 98.1 kg (216 lb 4.3 oz)   11/18/18 93.9 kg (207 lb)   10/03/18 93.9 kg (207 lb 2 oz)   09/10/18 95.7 kg (210 lb 13.9 oz)   02/21/18 96.4 kg (212 lb 8.4 oz)   10/10/17 96 kg (211 lb 10.3 oz)   06/02/17 96.6 kg (212 lb 15.4 oz)   05/18/17 96.7 kg (213 lb 4.7 oz)   03/27/17 98.9 kg (218 lb 2.3 oz)     Lab Results   Component Value Date    WBC 9.81 02/12/2019    HGB 11.9 (L) 02/12/2019    HCT 38.2 (L) 02/12/2019    MCV 92 02/12/2019     02/12/2019     Lab Results   Component Value Date    IRON 53 02/12/2019    TIBC 369 02/12/2019    FERRITIN 318 (H) 02/12/2019     Lab Results   Component Value Date    RNZJSTPL61 478 02/12/2019     Lab Results   Component Value Date    FOLATE 8.2 02/12/2019     CMP  Sodium   Date Value Ref Range Status   02/12/2019 140 136 - 145 mmol/L Final     Potassium   Date Value Ref Range Status   02/12/2019 4.6 3.5 - 5.1 mmol/L Final     Chloride   Date Value Ref Range Status   02/12/2019 100 95 - 110 mmol/L Final     CO2   Date Value Ref Range Status   02/12/2019 30 (H) 23 - 29 mmol/L Final     Glucose   Date Value Ref Range Status   02/12/2019 110 70 - 110 mg/dL Final     BUN, Bld   Date Value Ref Range Status   02/12/2019 23 8 - 23 mg/dL Final     Creatinine   Date Value Ref Range Status   02/12/2019 1.2 0.5 - 1.4 mg/dL Final     Calcium   Date Value Ref Range Status   02/12/2019 10.3 8.7 - 10.5 mg/dL Final     Total Protein   Date Value Ref Range Status   10/01/2018 7.8 6.0 - 8.4 g/dL Final     Albumin   Date Value Ref Range Status   10/01/2018 3.7 3.5 - 5.2 g/dL Final     Total Bilirubin   Date Value Ref Range Status   10/01/2018 0.4 0.1 - 1.0 mg/dL Final     Comment:     For infants and newborns, interpretation of results should be based  on gestational age, weight and in agreement with clinical  observations.  Premature Infant  recommended reference ranges:  Up to 24 hours.............<8.0 mg/dL  Up to 48 hours............<12.0 mg/dL  3-5 days..................<15.0 mg/dL  6-29 days.................<15.0 mg/dL       Alkaline Phosphatase   Date Value Ref Range Status   10/01/2018 69 55 - 135 U/L Final     AST   Date Value Ref Range Status   10/01/2018 22 10 - 40 U/L Final     ALT   Date Value Ref Range Status   10/01/2018 28 10 - 44 U/L Final     Anion Gap   Date Value Ref Range Status   02/12/2019 10 8 - 16 mmol/L Final     eGFR if    Date Value Ref Range Status   02/12/2019 >60.0 >60 mL/min/1.73 m^2 Final     eGFR if non    Date Value Ref Range Status   02/12/2019 >60.0 >60 mL/min/1.73 m^2 Final     Comment:     Calculation used to obtain the estimated glomerular filtration  rate (eGFR) is the CKD-EPI equation.        Lab Results   Component Value Date    TSH 3.111 10/01/2018     Objective:      Physical Exam   Constitutional: He is oriented to person, place, and time. He appears well-developed and well-nourished. No distress.   HENT:   Mouth/Throat: Oropharynx is clear and moist and mucous membranes are normal. No oral lesions. No oropharyngeal exudate.   Eyes: Conjunctivae are normal. Pupils are equal, round, and reactive to light. No scleral icterus.   Cardiovascular: Normal rate.   Pulmonary/Chest: Effort normal and breath sounds normal. No respiratory distress. He has no wheezes.   Abdominal: Soft. Normal appearance and bowel sounds are normal. He exhibits no distension, no abdominal bruit and no mass. There is no tenderness. There is no rigidity, no rebound, no guarding, no tenderness at McBurney's point and negative Ferguson's sign. A hernia (ventral- nontender & fully reducible) is present.   Neurological: He is alert and oriented to person, place, and time.   Skin: Skin is warm and dry. No rash noted. He is not diaphoretic. No erythema. No pallor.   Non-jaundiced   Psychiatric: He has a normal mood  and affect. His behavior is normal. Judgment and thought content normal.   Nursing note and vitals reviewed.      Assessment:       1. Positive FIT (fecal immunochemical test)    2. History of anemia    3. History of diarrhea    4. Heartburn    5. Elevated blood pressure reading    6. Anticoagulant long-term use    7. Ventral hernia without obstruction or gangrene        Plan:       Positive FIT (fecal immunochemical test)  - schedule Colonoscopy, discussed procedure with the patient, patient verbalized understanding    History of anemia  - schedule Colonoscopy, discussed procedure with the patient, patient verbalized understanding  - schedule EGD, discussed procedure with patient, patient verbalized understanding  - discussed with patient the different ways that anemia occurs: blood loss (such as from the gi tract), the body is not making enough, or the body is breaking down the rbcs too quickly; recommend EGD and colonoscopy to further evaluate gi tract for possible blood loss and pending results of endoscopies, possible UGI with Small Bowel Follow Through/video capsule study  -follow-up with PCP and/or hematology for continued evaluation and management    History of diarrhea  - schedule EGD, discussed procedure with patient, patient verbalized understanding  - schedule Colonoscopy, discussed procedure with the patient, patient verbalized understanding  - discussed with patient that certain medications, such as metformin, may be contributing to symptoms. I recommend follow-up with provider who manages medication to discuss about possible alternative therapy, patient verbalized understanding  - recommended OTC probiotic, such as Align or Culturelle, as directed  - avoid lactose    Heartburn  -discussed about the different types of medications used to treat reflux and how to use them, antacids can be used PRN for breakthrough heartburn symptoms by reducing stomach acid that is already produced, H2 blockers (zantac)  work by limiting the amount acid production, & PPI's work to block acid production and are taken daily, patient verbalized understanding.  -Educated patient on lifestyle modifications to help control/reduce reflux/abdominal pain including: avoid large meals, avoid eating within 2-3 hours of bedtime (avoid late night eating & lying down soon after eating), elevate head of bed if nocturnal symptoms are present, smoking cessation (if current smoker), & weight loss (if overweight).   -Educated to avoid known foods which trigger reflux symptoms & to minimize/avoid high-fat foods, chocolate, caffeine, citrus, alcohol, & tomato products.  -Advised to avoid/limit use of NSAID's, since they can cause GI upset, bleeding, and/or ulcers. If needed, take with food.   - schedule EGD, discussed procedure with patient, patient verbalized understanding    Elevated blood pressure reading  - requested staff to repeat BP  - instructed patient to monitor blood pressure at home and follow-up with PCP &/or cardiologist  - If blood pressure remains elevated and/or experiencing symptoms of headache, chest pain, shortness of breath, and/or blurred vision, recommend going to ER for further evaluation and management    Anticoagulant long-term use  - informed patient that the anticoagulant(s) will likely need to be held for endoscopy, nurse will confirm with cardiologist/PCP.    Ventral hernia without obstruction or gangrene  - discussed with patient about diagnosis, and informed patient that if it becomes painful or if it does not reduce patient needs to see a general surgeon or go to the ER, patient verbalized understanding    Follow-up in about 1 month (around 3/14/2019), or if symptoms worsen or fail to improve.      If no improvement in symptoms or symptoms worsen, call/follow-up at clinic or go to ER.

## 2019-02-14 NOTE — PATIENT INSTRUCTIONS
Essential hypertension. Maintain < 2 Gm Na a day diet, and monitor BP at home; keep a log. Stop lisinopril due to association w lung cancer.  -     telmisartan (MICARDIS) 40 MG Tab; Take 1 tablet (40 mg total) by mouth once daily.  Dispense: 90 tablet; Refill: 1  -     CBC auto differential; Future; Expected date: 02/14/2019  -     Comprehensive metabolic panel; Future; Expected date: 02/14/2019  -     Hemoglobin A1c; Future; Expected date: 02/14/2019  -     Lipid panel; Future; Expected date: 02/14/2019  -     TSH; Future; Expected date: 02/14/2019  -     T4, free; Future; Expected date: 02/14/2019  -     T3, free; Future; Expected date: 02/14/2019  -     Urinalysis; Future; Expected date: 02/14/2019    Mixed hyperlipidemia. Maintain low fat high fiber diet, exercise regularly. Cont atorvastatin.   -     Comprehensive metabolic panel; Future; Expected date: 02/14/2019  -     Lipid panel; Future; Expected date: 02/14/2019    Type 2 diabetes mellitus without complication, without long-term current use of insulin. Maintain a low carb diet;   monitor CBG's at home; keep a log for review.  -     Comprehensive metabolic panel; Future; Expected date: 02/14/2019  -     Hemoglobin A1c; Future; Expected date: 02/14/2019    S/P coronary artery stent placement; has been stable; sees Dr Ward as cardiologist.  -     CBC auto differential; Future; Expected date: 02/14/2019  -     Comprehensive metabolic panel; Future; Expected date: 02/14/2019  -     Lipid panel; Future; Expected date: 02/14/2019    ROBBY (obstructive sleep apnea); Caloric restriction w regular exercise and weight reduction.  -     TSH; Future; Expected date: 02/14/2019  -     T4, free; Future; Expected date: 02/14/2019  -     T3, free; Future; Expected date: 02/14/2019    Class 1 obesity due to excess calories with serious comorbidity and body mass index (BMI) of 30.0 to 30.9 in adult. Caloric restriction w regular exercise and weight reduction.  -     TSH;  Future; Expected date: 02/14/2019  -     T4, free; Future; Expected date: 02/14/2019  -     T3, free; Future; Expected date: 02/14/2019    Borderline hypothyroidism; follow TFT's.  -     TSH; Future; Expected date: 02/14/2019  -     T4, free; Future; Expected date: 02/14/2019  -     T3, free; Future; Expected date: 02/14/2019    Normocytic anemia; limit alcohol; take Men's 50+ vitamin one a day

## 2019-02-14 NOTE — PATIENT INSTRUCTIONS
Lower GI Bleeding (Stable)  You have signs of blood in your stool. This is called rectal bleeding. The bleeding may have begun in another part of your gastrointestinal (GI) tract. If the blood is bright red, it is likely coming from the lower part of the GI tract. If the blood is black or dark, it might be coming from higher up in the GI tract. Very small amounts of GI bleeding may not be visible and can only be discovered during a test on your stool. Possible causes of lower GI bleeding include:  · Hemorrhoids  · Anal fissures  · Diverticulitis  · Inflammatory bowel disease (Crohn's disease or ulcerative colitis)  · Polyps (growths) in the intestine  Note: Iron supplements and medicines for diarrhea or upset stomach can cause black stools. Foods such as licorice and red beets can also discolor the stool and be mistaken for bleeding. These are not bleeding and are not a cause for alarm.  Home care  You have not lost a large amount of blood and your condition appears stable at this time. You may resume normal activity as long as you feel well.  Avoid NSAIDs, such as aspirin, ibuprofen, or naproxen. They can irritate the stomach and cause further bleeding. If you are taking these medicines for other medical reasons, talk to your healthcare provider before you stop them.   Follow-up care  Follow up with your healthcare provider as advised. Further tests may be needed to find the cause of your bleeding.  When to seek medical advice  Call your healthcare provider for any of the following:  · Large amount of rectal bleeding   · Increasing abdominal pain  · Weakness, dizziness  Call 911  Get emergency medical care if any of the following occur:  · Loss of consciousness  · Vomiting blood  Date Last Reviewed: 6/24/2015  © 7572-5067 [employer]. [address] [place] [address]. All rights reserved. This information is not intended as a substitute for professional medical care. Always follow your  healthcare professional's instructions.

## 2019-02-14 NOTE — PROGRESS NOTES
"Subjective:       Patient ID: Jadiel Rossi is a 73 y.o. male.    Chief Complaint: Results (lab work and routine follow up )    HPI  Overall doing fine.  HTN: BP avr 130's high/65-67; watches his salt intake; here 142/60. Will change lisinopril to micardis.   DM2: -118; watches his carb intake.   Renal insufficiency; GFR improved fro 50 to >60, of note.   Coronary stent x1; No CP, SOB, or palp. Card is Dr Ward; sees him tuesday.  AF: on eliquis as per cardiology. Also on metoprolol.    Review of Systems   Constitutional: Negative for appetite change, fever and unexpected weight change.   HENT: Negative for congestion, postnasal drip, rhinorrhea and sinus pressure.          seasonal allergies,    Eyes: Negative for discharge and itching.   Respiratory: Negative for cough, chest tightness, shortness of breath and wheezing.    Cardiovascular: Negative for chest pain, palpitations and leg swelling.   Gastrointestinal: Negative for abdominal pain, blood in stool, constipation, diarrhea, nausea and vomiting.   Endocrine: Negative for polydipsia, polyphagia and polyuria.   Genitourinary: Negative for dysuria and hematuria.   Musculoskeletal: Negative for arthralgias and myalgias.   Skin: Negative for rash.   Allergic/Immunologic: Negative for environmental allergies and food allergies.   Neurological: Negative for tremors, seizures and headaches.   Hematological: Negative for adenopathy. Bruises/bleeds easily.        On eliquis   Psychiatric/Behavioral:        Denies anxiety or depression.       Objective:      Vitals:    02/14/19 1549   BP: (!) 142/60   Pulse: 78   Resp: 16   Temp: 98.5 °F (36.9 °C)   TempSrc: Oral   SpO2: 95%   Weight: 96 kg (211 lb 10.3 oz)   Height: 5' 10" (1.778 m)     Body mass index is 30.37 kg/m².    Physical Exam   Constitutional: He is oriented to person, place, and time. He appears well-developed and well-nourished.   HENT:   Head: Normocephalic and atraumatic.   Eyes: EOM are normal. "   Neck: Normal range of motion. Neck supple. No thyromegaly present.   Cardiovascular: Normal rate, regular rhythm and normal heart sounds. Exam reveals no gallop.   No murmur heard.  MARCO ANTONIO: 3-4/6 aortic; 2/6 pulm/LLSB; 2-3/6 apex.   Pulmonary/Chest: Effort normal and breath sounds normal. No respiratory distress. He has no wheezes. He has no rales.   Abdominal: Soft. Bowel sounds are normal. He exhibits no distension. There is no tenderness. There is no rebound and no guarding.   Musculoskeletal: Normal range of motion. He exhibits edema.   Tr-1+ shraddha LE edema; no calf tenderness to palp.   Lymphadenopathy:     He has no cervical adenopathy.   Neurological: He is alert and oriented to person, place, and time.   Moves all 4 extremities fine.   Skin: No rash noted.   Psychiatric: He has a normal mood and affect. His behavior is normal. Thought content normal.   Vitals reviewed.      Assessment:       1. Essential hypertension    2. Mixed hyperlipidemia    3. Type 2 diabetes mellitus without complication, without long-term current use of insulin    4. S/P coronary artery stent placement    5. ROBBY (obstructive sleep apnea)    6. Class 1 obesity due to excess calories with serious comorbidity and body mass index (BMI) of 30.0 to 30.9 in adult    7. Borderline hypothyroidism    8. Normocytic anemia    9. Atrial fibrillation, unspecified type        Plan:       Essential hypertension. Maintain < 2 Gm Na a day diet, and monitor BP at home; keep a log. Stop lisinopril due to association w lung cancer.  -     telmisartan (MICARDIS) 40 MG Tab; Take 1 tablet (40 mg total) by mouth once daily.  Dispense: 90 tablet; Refill: 1  -     CBC auto differential; Future; Expected date: 02/14/2019  -     Comprehensive metabolic panel; Future; Expected date: 02/14/2019  -     Hemoglobin A1c; Future; Expected date: 02/14/2019  -     Lipid panel; Future; Expected date: 02/14/2019  -     TSH; Future; Expected date: 02/14/2019  -     T4, free;  Future; Expected date: 02/14/2019  -     T3, free; Future; Expected date: 02/14/2019  -     Urinalysis; Future; Expected date: 02/14/2019    Mixed hyperlipidemia. Maintain low fat high fiber diet, exercise regularly. Cont atorvastatin.   -     Comprehensive metabolic panel; Future; Expected date: 02/14/2019  -     Lipid panel; Future; Expected date: 02/14/2019    Type 2 diabetes mellitus without complication, without long-term current use of insulin. Maintain a low carb diet;   monitor CBG's at home; keep a log for review.  -     Comprehensive metabolic panel; Future; Expected date: 02/14/2019  -     Hemoglobin A1c; Future; Expected date: 02/14/2019    S/P coronary artery stent placement; has been stable; sees Dr Ward as cardiologist.  -     CBC auto differential; Future; Expected date: 02/14/2019  -     Comprehensive metabolic panel; Future; Expected date: 02/14/2019  -     Lipid panel; Future; Expected date: 02/14/2019    ROBBY (obstructive sleep apnea); Caloric restriction w regular exercise and weight reduction.  -     TSH; Future; Expected date: 02/14/2019  -     T4, free; Future; Expected date: 02/14/2019  -     T3, free; Future; Expected date: 02/14/2019    Class 1 obesity due to excess calories with serious comorbidity and body mass index (BMI) of 30.0 to 30.9 in adult. Caloric restriction w regular exercise and weight reduction.  -     TSH; Future; Expected date: 02/14/2019  -     T4, free; Future; Expected date: 02/14/2019  -     T3, free; Future; Expected date: 02/14/2019    Borderline hypothyroidism; follow TFT's.  -     TSH; Future; Expected date: 02/14/2019  -     T4, free; Future; Expected date: 02/14/2019  -     T3, free; Future; Expected date: 02/14/2019    Normocytic anemia; limit alcohol; take Men's 50+ vitamin one a day    Atrial fibrillation; on eliquis as per cardiology Dr Ward.

## 2019-02-22 RX ORDER — ALBUTEROL SULFATE 90 UG/1
2 AEROSOL, METERED RESPIRATORY (INHALATION) EVERY 6 HOURS PRN
Qty: 18 G | Refills: 2 | Status: SHIPPED | OUTPATIENT
Start: 2019-02-22 | End: 2019-05-06 | Stop reason: SDUPTHER

## 2019-02-22 NOTE — TELEPHONE ENCOUNTER
----- Message from Manuelito Hernandez sent at 2/22/2019  3:37 PM CST -----  Contact: Patient  Type: Needs Medical Advice    Who Called: Patient  Pharmacy name and phone #:    [id number] - [place] [address] AT [address] [address]  Phone: [phone] Fax: [phone]  Best Call Back Number: [phone]  Additional Information: Patient states that [facility] has sent 2 requests for albuterol (VENTOLIN HFA) 90 mcg/actuation inhaler to be refilled. Please call to verify. Thanks!

## 2019-02-24 DIAGNOSIS — E78.2 MIXED HYPERLIPIDEMIA: ICD-10-CM

## 2019-02-24 DIAGNOSIS — Z95.5 S/P CORONARY ARTERY STENT PLACEMENT: ICD-10-CM

## 2019-02-25 RX ORDER — ATORVASTATIN CALCIUM 40 MG/1
TABLET, FILM COATED ORAL
Qty: 90 TABLET | Refills: 1 | Status: SHIPPED | OUTPATIENT
Start: 2019-02-25 | End: 2019-08-26 | Stop reason: SDUPTHER

## 2019-02-25 NOTE — PROGRESS NOTES
Refill Authorization Note     is requesting a refill authorization.    Brief assessment and rationale for refill: ROUTE: NPP  Name and strength of medication: ATORVASTATIN 40MG TABLETS       Medication Therapy Plan: recently seen; hld wnl; will queue 6 more months                   How patient will take medication: t1t po daily           Comments:   Lab Results   Component Value Date    CHOL 136 10/01/2018    CHOL 157 02/16/2018    CHOL 170 05/15/2017     Lab Results   Component Value Date    HDL 27 (L) 10/01/2018    HDL 38 (L) 02/16/2018    HDL 32 (L) 05/15/2017     Lab Results   Component Value Date    LDLCALC 67.0 10/01/2018    LDLCALC 78.8 02/16/2018    LDLCALC 93 05/15/2017     Lab Results   Component Value Date    TRIG 210 (H) 10/01/2018    TRIG 201 (H) 02/16/2018    TRIG 224 (H) 05/15/2017     Lab Results   Component Value Date    CHOLHDL 19.9 (L) 10/01/2018    CHOLHDL 24.2 02/16/2018

## 2019-03-08 ENCOUNTER — TELEPHONE (OUTPATIENT)
Dept: GASTROENTEROLOGY | Facility: CLINIC | Age: 74
End: 2019-03-08

## 2019-03-08 NOTE — TELEPHONE ENCOUNTER
Spoke with pt. Rescheduled procedure per pt request. Pt verbalized understanding to new date & time. Pt did not need reminder letter or instructions mailed at this time but if he cannot locate them in the next few days, he will call back.

## 2019-03-08 NOTE — TELEPHONE ENCOUNTER
----- Message from Jossue Soriano sent at 3/8/2019  2:29 PM CST -----  Contact: same  Patient called in and has a procedure (EGD) scheduled for 4/17/19 and needs to cancel.  Patient would like a call back to reschedule at [phone]

## 2019-04-22 DIAGNOSIS — I10 ESSENTIAL HYPERTENSION: ICD-10-CM

## 2019-04-23 RX ORDER — INDAPAMIDE 1.25 MG/1
TABLET ORAL
Qty: 30 TABLET | Refills: 0 | Status: SHIPPED | OUTPATIENT
Start: 2019-04-23 | End: 2019-05-23 | Stop reason: SDUPTHER

## 2019-04-23 NOTE — TELEPHONE ENCOUNTER
Pt Of Eliud Chandler MD    Last seen on: 2/14/19    Next appt: 5/15/19    Last refill: 11/8/18    Allergies: Review of patient's allergies indicates:  No Known Allergies    Pharmacy:   [facility] [id number] [place] [address] [address] [address]  Phone: [phone] Fax: [phone]    Labs: Please review.      Please review! Thank you!

## 2019-04-29 ENCOUNTER — TELEPHONE (OUTPATIENT)
Dept: GASTROENTEROLOGY | Facility: CLINIC | Age: 74
End: 2019-04-29

## 2019-04-29 ENCOUNTER — PATIENT MESSAGE (OUTPATIENT)
Dept: GASTROENTEROLOGY | Facility: CLINIC | Age: 74
End: 2019-04-29

## 2019-04-29 NOTE — TELEPHONE ENCOUNTER
----- Message from Patt Garay sent at 4/29/2019  3:38 PM CDT -----  Contact: pt  Calling in regards to his upcoming procedure on May 1st and no one has called him and wanted information and please advise . [phone] (home)

## 2019-04-30 RX ORDER — ATENOLOL 50 MG/1
50 TABLET ORAL 2 TIMES DAILY
Status: ON HOLD | COMMUNITY
End: 2020-04-26

## 2019-05-01 ENCOUNTER — ANESTHESIA (OUTPATIENT)
Dept: ENDOSCOPY | Facility: HOSPITAL | Age: 74
End: 2019-05-01
Payer: MEDICARE

## 2019-05-01 ENCOUNTER — HOSPITAL ENCOUNTER (OUTPATIENT)
Facility: HOSPITAL | Age: 74
Discharge: HOME | End: 2019-05-01
Attending: INTERNAL MEDICINE | Admitting: INTERNAL MEDICINE
Payer: MEDICARE

## 2019-05-01 ENCOUNTER — ANESTHESIA EVENT (OUTPATIENT)
Dept: ENDOSCOPY | Facility: HOSPITAL | Age: 74
End: 2019-05-01
Payer: MEDICARE

## 2019-05-01 VITALS
BODY MASS INDEX: 31.21 KG/M2 | DIASTOLIC BLOOD PRESSURE: 60 MMHG | HEART RATE: 64 BPM | OXYGEN SATURATION: 91 % | HEIGHT: 70 IN | RESPIRATION RATE: 11 BRPM | WEIGHT: 218 LBS | TEMPERATURE: 98 F | SYSTOLIC BLOOD PRESSURE: 135 MMHG

## 2019-05-01 DIAGNOSIS — D64.9 ANEMIA: ICD-10-CM

## 2019-05-01 LAB
C DIFF GDH STL QL: NEGATIVE
C DIFF TOX A+B STL QL IA: NEGATIVE
GLUCOSE SERPL-MCNC: 129 MG/DL (ref 70–110)

## 2019-05-01 PROCEDURE — 43239 EGD BIOPSY SINGLE/MULTIPLE: CPT | Mod: 51,HCNC,, | Performed by: INTERNAL MEDICINE

## 2019-05-01 PROCEDURE — 87449 NOS EACH ORGANISM AG IA: CPT | Mod: HCNC,PO | Performed by: INTERNAL MEDICINE

## 2019-05-01 PROCEDURE — 88305 TISSUE SPECIMEN TO PATHOLOGY - SURGERY: ICD-10-PCS | Mod: 26,HCNC,, | Performed by: PATHOLOGY

## 2019-05-01 PROCEDURE — 88305 TISSUE EXAM BY PATHOLOGIST: CPT | Mod: 26,HCNC,, | Performed by: PATHOLOGY

## 2019-05-01 PROCEDURE — 87427 SHIGA-LIKE TOXIN AG IA: CPT | Mod: HCNC

## 2019-05-01 PROCEDURE — 88342 TISSUE SPECIMEN TO PATHOLOGY - SURGERY: ICD-10-PCS | Mod: 26,HCNC,, | Performed by: PATHOLOGY

## 2019-05-01 PROCEDURE — 45385 COLONOSCOPY W/LESION REMOVAL: CPT | Mod: HCNC,,, | Performed by: INTERNAL MEDICINE

## 2019-05-01 PROCEDURE — D9220A PRA ANESTHESIA: Mod: HCNC,ANES,, | Performed by: ANESTHESIOLOGY

## 2019-05-01 PROCEDURE — 87045 FECES CULTURE AEROBIC BACT: CPT | Mod: HCNC

## 2019-05-01 PROCEDURE — 87046 STOOL CULTR AEROBIC BACT EA: CPT | Mod: HCNC

## 2019-05-01 PROCEDURE — 37000009 HC ANESTHESIA EA ADD 15 MINS: Mod: HCNC,PO | Performed by: INTERNAL MEDICINE

## 2019-05-01 PROCEDURE — 63600175 PHARM REV CODE 636 W HCPCS: Mod: HCNC,PO | Performed by: NURSE ANESTHETIST, CERTIFIED REGISTERED

## 2019-05-01 PROCEDURE — 27201089 HC SNARE, DISP (ANY): Mod: HCNC,PO | Performed by: INTERNAL MEDICINE

## 2019-05-01 PROCEDURE — D9220A PRA ANESTHESIA: ICD-10-PCS | Mod: HCNC,ANES,, | Performed by: ANESTHESIOLOGY

## 2019-05-01 PROCEDURE — 88342 IMHCHEM/IMCYTCHM 1ST ANTB: CPT | Mod: 26,HCNC,, | Performed by: PATHOLOGY

## 2019-05-01 PROCEDURE — 87449 NOS EACH ORGANISM AG IA: CPT | Mod: HCNC

## 2019-05-01 PROCEDURE — 82962 GLUCOSE BLOOD TEST: CPT | Mod: HCNC,PO | Performed by: INTERNAL MEDICINE

## 2019-05-01 PROCEDURE — 87209 SMEAR COMPLEX STAIN: CPT | Mod: HCNC

## 2019-05-01 PROCEDURE — 25000003 PHARM REV CODE 250: Mod: HCNC,PO | Performed by: INTERNAL MEDICINE

## 2019-05-01 PROCEDURE — 88305 TISSUE EXAM BY PATHOLOGIST: CPT | Mod: HCNC | Performed by: PATHOLOGY

## 2019-05-01 PROCEDURE — 27201012 HC FORCEPS, HOT/COLD, DISP: Mod: HCNC,PO | Performed by: INTERNAL MEDICINE

## 2019-05-01 PROCEDURE — 45385 COLONOSCOPY W/LESION REMOVAL: CPT | Mod: HCNC,PO | Performed by: INTERNAL MEDICINE

## 2019-05-01 PROCEDURE — 43239 PR EGD, FLEX, W/BIOPSY, SGL/MULTI: ICD-10-PCS | Mod: 51,HCNC,, | Performed by: INTERNAL MEDICINE

## 2019-05-01 PROCEDURE — 37000008 HC ANESTHESIA 1ST 15 MINUTES: Mod: HCNC,PO | Performed by: INTERNAL MEDICINE

## 2019-05-01 PROCEDURE — 43239 EGD BIOPSY SINGLE/MULTIPLE: CPT | Mod: HCNC,PO | Performed by: INTERNAL MEDICINE

## 2019-05-01 PROCEDURE — 25000003 PHARM REV CODE 250: Mod: HCNC,PO | Performed by: NURSE ANESTHETIST, CERTIFIED REGISTERED

## 2019-05-01 PROCEDURE — 45385 PR COLONOSCOPY,REMV LESN,SNARE: ICD-10-PCS | Mod: HCNC,,, | Performed by: INTERNAL MEDICINE

## 2019-05-01 RX ORDER — PANTOPRAZOLE SODIUM 40 MG/1
40 TABLET, DELAYED RELEASE ORAL
Qty: 60 TABLET | Refills: 5 | Status: SHIPPED | OUTPATIENT
Start: 2019-05-01 | End: 2019-08-27

## 2019-05-01 RX ORDER — SODIUM CHLORIDE, SODIUM LACTATE, POTASSIUM CHLORIDE, CALCIUM CHLORIDE 600; 310; 30; 20 MG/100ML; MG/100ML; MG/100ML; MG/100ML
INJECTION, SOLUTION INTRAVENOUS CONTINUOUS
Status: DISCONTINUED | OUTPATIENT
Start: 2019-05-01 | End: 2019-05-01 | Stop reason: HOSPADM

## 2019-05-01 RX ORDER — LIDOCAINE HCL/PF 100 MG/5ML
SYRINGE (ML) INTRAVENOUS
Status: DISCONTINUED | OUTPATIENT
Start: 2019-05-01 | End: 2019-05-01

## 2019-05-01 RX ORDER — PROPOFOL 10 MG/ML
VIAL (ML) INTRAVENOUS
Status: DISCONTINUED | OUTPATIENT
Start: 2019-05-01 | End: 2019-05-01

## 2019-05-01 RX ORDER — GLYCOPYRROLATE 0.2 MG/ML
INJECTION INTRAMUSCULAR; INTRAVENOUS
Status: DISCONTINUED | OUTPATIENT
Start: 2019-05-01 | End: 2019-05-01

## 2019-05-01 RX ORDER — SODIUM CHLORIDE 0.9 % (FLUSH) 0.9 %
10 SYRINGE (ML) INJECTION
Status: DISCONTINUED | OUTPATIENT
Start: 2019-05-01 | End: 2019-05-01 | Stop reason: HOSPADM

## 2019-05-01 RX ADMIN — PROPOFOL 50 MG: 10 INJECTION, EMULSION INTRAVENOUS at 11:05

## 2019-05-01 RX ADMIN — GLYCOPYRROLATE 0.2 MG: 0.2 INJECTION, SOLUTION INTRAMUSCULAR; INTRAVENOUS at 11:05

## 2019-05-01 RX ADMIN — PROPOFOL 50 MG: 10 INJECTION, EMULSION INTRAVENOUS at 12:05

## 2019-05-01 RX ADMIN — LIDOCAINE HYDROCHLORIDE 150 MG: 20 INJECTION, SOLUTION INTRAVENOUS at 11:05

## 2019-05-01 RX ADMIN — PROPOFOL 100 MG: 10 INJECTION, EMULSION INTRAVENOUS at 11:05

## 2019-05-01 RX ADMIN — SODIUM CHLORIDE, SODIUM LACTATE, POTASSIUM CHLORIDE, AND CALCIUM CHLORIDE: .6; .31; .03; .02 INJECTION, SOLUTION INTRAVENOUS at 10:05

## 2019-05-01 NOTE — ANESTHESIA PREPROCEDURE EVALUATION
05/01/2019  Jadiel Rossi is a 73 y.o., male.    Anesthesia Evaluation    I have reviewed the Patient Summary Reports.    I have reviewed the Nursing Notes.      Review of Systems  Anesthesia Hx:  No problems with previous Anesthesia    Cardiovascular:   Hypertension Valvular problems/Murmurs    Pulmonary:   Sleep Apnea    Endocrine:   Diabetes Hypothyroidism        Physical Exam  General:  Well nourished, Obesity    Airway/Jaw/Neck:  Airway Findings: Mouth Opening: Normal Tongue: Normal  Mallampati: IV  TM Distance: Normal, at least 6 cm  Jaw/Neck Findings:  Neck ROM: Normal ROM     Eyes/Ears/Nose:  Eyes/Ears/Nose Findings:    Dental:  Dental Findings: In tact, Periodontal disease, Mild   Chest/Lungs:  Chest/Lungs Findings: Normal Respiratory Rate     Heart/Vascular:  Heart Findings: Rate: Normal  Rhythm: Regular Rhythm        Mental Status:  Mental Status Findings:  Cooperative, Alert and Oriented         Anesthesia Plan  Type of Anesthesia, risks & benefits discussed:  Anesthesia Type:  general  Patient's Preference: General  Intra-op Monitoring Plan: standard ASA monitors  Intra-op Monitoring Plan Comments:   Post Op Pain Control Plan:   Post Op Pain Control Plan Comments:   Induction:   IV  Beta Blocker:  Patient is not currently on a Beta-Blocker (No further documentation required).       Informed Consent: Patient understands risks and agrees with Anesthesia plan.  Questions answered. Anesthesia consent signed with patient.  ASA Score: 3     Day of Surgery Review of History & Physical:    H&P update referred to the surgeon.         Ready For Surgery From Anesthesia Perspective.

## 2019-05-01 NOTE — TRANSFER OF CARE
"Anesthesia Transfer of Care Note    Patient: Jadiel Rossi    Procedure(s) Performed: Procedure(s) (LRB):  EGD (ESOPHAGOGASTRODUODENOSCOPY) (N/A)  COLONOSCOPY (N/A)    Patient location: PACU    Anesthesia Type: general    Transport from OR: Transported from OR on 2-3 L/min O2 by NC with adequate spontaneous ventilation    Post pain: adequate analgesia    Post assessment: no apparent anesthetic complications and tolerated procedure well    Post vital signs: stable    Level of consciousness: sedated    Nausea/Vomiting: no nausea/vomiting    Complications: none    Transfer of care protocol was followed      Last vitals:   Visit Vitals  BP (!) 185/76 (BP Location: Right arm, Patient Position: Lying)   Pulse 65   Temp 36.7 °C (98.1 °F) (Skin)   Resp 16   Ht 5' 10" (1.778 m)   Wt 98.9 kg (218 lb)   SpO2 95%   BMI 31.28 kg/m²     "

## 2019-05-01 NOTE — BRIEF OP NOTE
Discharge Note  Short Stay      SUMMARY     Admit Date: 5/1/2019    Attending Physician: Jimenez Solomon Jr., MD     Discharge Physician: Jimenez Solomon Jr., MD    Discharge Date: 5/1/2019 12:53 PM    Final Diagnosis: Heartburn [R12]  Anemia, unspecified type [D64.9]  Positive FIT (fecal immunochemical test) [R19.5]  Intermittent diarrhea [R19.7]    Impression:          - Normal oropharynx.                       - Normal esophagus.                       - Z-line regular, 39 cm from the incisors.                       - Normal cardia.                       - Slight Gastritis. Biopsied.                       - Slight antritis. Biopsied.                       - Normal stomach otherwise.                       - Normal examined duodenum. Biopsied.  Recommendation:      - Perform a colonoscopy as previously scheduled.                       - Discharge patient to home after that.                       - Await pathology and CLOtest results.                       - Use Protonix (pantoprazole) 40 mg PO daily for 2                        months.                       - Call the [facility] clinic in 2 weeks for reports (if                        you haven't heard from us sooner) [phone].                       - Return to primary care physician, Dr. Chandler, as                        per your routine.    Impression:  - Five 2 to 3 mm polyps in the transverse colon,                        removed with a cold snare. Resected and retrieved.                       - One 2 mm polyp at the hepatic flexure, removed                        with a cold snare. Resected and retrieved.                       - Mild colonic spasm consistent with irritable bowel                        syndrome.                       - Non-bleeding internal hemorrhoids.                       - The examination was otherwise normal.  Recommendation:      - Discharge patient to home.                       - Await pathology results.                       - If the  pathology report reveals adenomatous                        tissue, then repeat the colonoscopy for surveillance                        in 4 years.                       - If the pathology report indicates hyperplastic                        polyp, then repeat colonoscopy for surveillance in 6                        years.                       - High fiber diet.                       - Use fiber, for example Citrucel, Fibercon, Konsyl                        or Metamucil.                       - Take a PROBIOTIC, such as ALIGN or CULTURELLE or                        ROSA-Q (all non-prescription), every day for a                        month.                       - Continue present medications.                       - Resume Eliquis (apixaban) at prior dose tomorrow.                       - Call the [facility] clinic in 2 weeks for reports (if                        you haven't heard from us sooner) [phone].                       - Return to primary care physician, Dr. Keane, as                        per your routine.    Jimenez Solomon MD  5/1/2019   Disposition: HOME OR SELF CARE    Patient Instructions:   Current Discharge Medication List      START taking these medications    Details   pantoprazole (PROTONIX) 40 MG tablet Take 1 tablet (40 mg total) by mouth before breakfast.  Qty: 60 tablet, Refills: 5         CONTINUE these medications which have NOT CHANGED    Details   albuterol (VENTOLIN HFA) 90 mcg/actuation inhaler Inhale 2 puffs into the lungs every 6 (six) hours as needed for Wheezing. Rescue  Qty: 18 g, Refills: 2      amLODIPine (NORVASC) 5 MG tablet Take 5 mg by mouth once daily.      atenolol (TENORMIN) 50 MG tablet Take 50 mg by mouth once daily.      atorvastatin (LIPITOR) 40 MG tablet TAKE 1 TABLET(40 MG) BY MOUTH EVERY DAY  Qty: 90 tablet, Refills: 1    Associated Diagnoses: Mixed hyperlipidemia; S/P coronary artery stent placement      ELIQUIS 5 mg Tab Take 1 tablet by mouth 2 (two) times  daily.  Refills: 3      fluticasone (FLONASE) 50 mcg/actuation nasal spray 1 spray (50 mcg total) by Each Nare route 2 (two) times daily as needed for Rhinitis or Allergies.  Qty: 1 Bottle, Refills: 1    Associated Diagnoses: Sinus congestion; Viral illness      indapamide (LOZOL) 1.25 MG Tab TAKE 1 TABLET BY MOUTH EVERY MORNING  Qty: 30 tablet, Refills: 0    Associated Diagnoses: Essential hypertension      metFORMIN (GLUCOPHAGE) 500 MG tablet TAKE 1 TABLET BY MOUTH TWICE DAILY  Qty: 180 tablet, Refills: 2    Comments: **Patient requests 90 days supply**      multivitamin (ONE DAILY MULTIVITAMIN) per tablet Take 1 tablet by mouth once daily.      sodium chloride (OCEAN NASAL) 0.65 % nasal spray 1 spray by Nasal route as needed for Congestion.  Qty: 45 mL, Refills: 3    Associated Diagnoses: Sinus congestion; Viral illness      telmisartan (MICARDIS) 40 MG Tab Take 1 tablet (40 mg total) by mouth once daily.  Qty: 90 tablet, Refills: 1    Associated Diagnoses: Essential hypertension      blood sugar diagnostic (BLOOD GLUCOSE TEST) Strp by Misc.(Non-Drug; Combo Route) route.      lancets (EASY TOUCH LANCETS) 28 gauge Misc by Misc.(Non-Drug; Combo Route) route.      nitroGLYCERIN (NITROSTAT) 0.4 MG SL tablet DISSOLVE ONE T SUBLINGUALLY Q 5 MINUTES X 3 DOSES PRN FOR CHEST PAIN  Refills: 0             Discharge Procedure Orders (must include Diet, Follow-up, Activity)    Follow Up:  Follow up with PCP as per your routine.  Please follow a high fiber diet.  Activity as tolerated.    No driving day of procedure.    PROBIOTICS:  Now that your colon is so cleaned out, now is a good time for a round of PROBIOTICS.  Take a Probiotic product such as Align or Culturelle or Deanne-Q, every day for a month.                  (The products listed are non-prescription, but you may need to ask the pharmacist for their location.)  Repeat this at least 5-6 times a year.

## 2019-05-01 NOTE — [employer] PATIENT INSTRUCTIONS
Discharge Summary/Instructions for after Colonoscopy with   Biopsy/Polypectomy  Jadiel Rossi    Wednesday, May 01, 2019  Jimenez Solomon MD  RESTRICTIONS ON ACTIVITY:  - Do not drive a car or operate machinery until the day after the procedure.      - The following day: return to full activity including work.  - For  3 days: No heavy lifting, straining or running.  - Diet: You can have solid foods, but no gassy foods (i.e. beans, broccoli,   cabbage, etc).  TREATMENT FOR COMMON SIDE EFFECTS:  - Mild abdominal pain and bloating or excessive gas: rest, eat lightly and   use a heating pad.  SYMPTOMS TO WATCH FOR AND REPORT TO YOUR PHYSICIAN:  1. Severe abdominal pain.  2. Fever within 24 hours after a procedure.  3. A large amount of rectal bleeding. (A small amount of blood from the   rectum is not serious, especially if hemorrhoids are present.  3.  Because air was put into your colon during the procedure, expelling   large amounts of air from your rectum is normal.  4.  You may not have a bowel movement for 1-3 days because of the   colonoscopy prep.  This is normal.  5.  Call immediately if you notice any of the following:   Chills and/or fever over 101   Persistent vomiting   Severe abdominal pain, other than gas cramps   Severe chest pain   Black, tarry stools   Any bleeding - exceeding one tablespoon  Your doctor recommends these additional instructions:  We are waiting for your pathology results.   If the pathology report reveals adenomatous (precancerous) tissue, then your   physician recommends a repeat colonoscopy for surveillance in 4-5  years.   If the pathology report indicates a hyperplastic polyp, then the colonoscopy   will be repeated for surveillance in 6-7 years.   Eat a high fiber diet.   Take a fiber supplement, for example Citrucel, Fibercon, Konsyl or   Metamucil.   Take a PROBIOTIC, such as ALIGN or CULTURELLE or ROSA-Q (all   non-prescription), every day for a month.   And repeat this  at least 5-6   times a year.  Continue your present medications.   Resume taking Eliquis (apixaban) at your prior dose tomorrow.   Return to your primary care physician, Dr. Chandler, as per your routine.  None  If you have any questions or problems, please call your physician.  EMERGENCY PHONE NUMBER: [phone]  LAB RESULTS: Call in two (2) weeks for lab results, [phone]  ___________________________________________  Nurse Signature  ___________________________________________  Patient/Designated Responsible Party Signature  Jimenez Solomon MD  5/1/2019 12:49:49 PM  This report has been verified and signed electronically.  [employer]

## 2019-05-01 NOTE — H&P
"History & Physical - Short Stay  Gastroenterology      SUBJECTIVE:     Procedure: Gastroscopy and Colonoscopy.    Chief Complaint/Indication for Procedure: Positive FIT.  Anemia.    History of Present Illness:  Office Visit     2/14/2019  [facility]      [facility]   Gastroenterology   Positive FIT (fecal immunochemical test) +6 more   Dx   Anemia ; Referred by Eliud Chandler MD   Reason for Visit        Progress Notes        Subjective:       Patient ID: Jadiel Rossi is a 73 y.o. male Body mass index is 31.03 kg/m².     Chief Complaint: Anemia     This patient is new to me.  Referring Provider:  Dr. Chandler for colon cancer screening.     Patient seen for colorectal cancer screening, average risk, age appropriate, first occurrence, with associated signs/symptoms of occult positive stool (see ROS). Denies family history of colon cancer/polyps.     Reviewed telephone encounter 1/31/19 from PCP: "Documentation:  Received fecal immunochemical test (fit) from 12/20/2018 collection date which was positive.  I researched patient's chart; colonoscopy has been overdue since 12/31/1996.  Called patient by phone; left a voicemail message.  Express the need for him to see his GI doctor to update his colonoscopy with a fecal immunochemical testing positive for blood; which is a risk factor for colorectal cancer.  Patient to call the office;  Please try to reach patient by phone tomorrow to notify him of the results and find out who his GI doctor is and that with  immunochemical fecal testing positive, he needs to obtain a colonoscopy as soon as possible from his GI MD for further evaluation"        GI Problem   The primary symptoms include diarrhea (occasional; occasional after eating greasy food or subway sandwiches; otherwise bowel movements once daily of formed stool, denies currently). Primary symptoms do not include fever, weight loss, fatigue, abdominal pain, nausea, vomiting, melena, " hematemesis, hematochezia (not visible to patient but positive stool study) or dysuria.   The illness is also significant for bloating (occasional). The illness does not include chills, anorexia, dysphagia, odynophagia or constipation. Significant associated medical issues include GERD (tums prn- not taken often). Associated medical issues do not include inflammatory bowel disease.      Assessment:       1. Positive FIT (fecal immunochemical test)    2. History of anemia    3. History of diarrhea    4. Heartburn    5. Elevated blood pressure reading    6. Anticoagulant long-term use    7. Ventral hernia without obstruction or gangrene        Plan:       Positive FIT (fecal immunochemical test)  - schedule Colonoscopy, discussed procedure with the patient, patient verbalized understanding     History of anemia  - schedule Colonoscopy, discussed procedure with the patient, patient verbalized understanding  - schedule EGD, discussed procedure with patient, patient verbalized understanding  - discussed with patient the different ways that anemia occurs: blood loss (such as from the gi tract), the body is not making enough, or the body is breaking down the rbcs too quickly; recommend EGD and colonoscopy to further evaluate gi tract for possible blood loss and pending results of endoscopies, possible UGI with Small Bowel Follow Through/video capsule study  -follow-up with PCP and/or hematology for continued evaluation and management     History of diarrhea  - schedule EGD, discussed procedure with patient, patient verbalized understanding  - schedule Colonoscopy, discussed procedure with the patient, patient verbalized understanding  - discussed with patient that certain medications, such as metformin, may be contributing to symptoms. I recommend follow-up with provider who manages medication to discuss about possible alternative therapy, patient verbalized understanding  - recommended OTC probiotic, such as Align or  Culturelle, as directed  - avoid lactose     Heartburn  -discussed about the different types of medications used to treat reflux and how to use them, antacids can be used PRN for breakthrough heartburn symptoms by reducing stomach acid that is already produced, H2 blockers (zantac) work by limiting the amount acid production, & PPI's work to block acid production and are taken daily, patient verbalized understanding.  -Educated patient on lifestyle modifications to help control/reduce reflux/abdominal pain including: avoid large meals, avoid eating within 2-3 hours of bedtime (avoid late night eating & lying down soon after eating), elevate head of bed if nocturnal symptoms are present, smoking cessation (if current smoker), & weight loss (if overweight).   -Educated to avoid known foods which trigger reflux symptoms & to minimize/avoid high-fat foods, chocolate, caffeine, citrus, alcohol, & tomato products.  -Advised to avoid/limit use of NSAID's, since they can cause GI upset, bleeding, and/or ulcers. If needed, take with food.   - schedule EGD, discussed procedure with patient, patient verbalized understanding     Elevated blood pressure reading  - requested staff to repeat BP  - instructed patient to monitor blood pressure at home and follow-up with PCP &/or cardiologist  - If blood pressure remains elevated and/or experiencing symptoms of headache, chest pain, shortness of breath, and/or blurred vision, recommend going to ER for further evaluation and management     Anticoagulant long-term use  - informed patient that the anticoagulant(s) will likely need to be held for endoscopy, nurse will confirm with cardiologist/PCP.     Ventral hernia without obstruction or gangrene  - discussed with patient about diagnosis, and informed patient that if it becomes painful or if it does not reduce patient needs to see a general surgeon or go to the ER, patient verbalized understanding     Follow-up in about 1 month (around  3/14/2019), or if symptoms worsen or fail to improve.            LABS:  Results for NILSON FUNES (MRN [id number]) as of 4/30/2019 20:13   Ref. Range 4/19/2017 14:00 9/29/2017 14:24 2/16/2018 15:16 8/9/2018 15:11 10/1/2018 11:56 2/12/2019 14:22   Hemoglobin Latest Ref Range: 14.0 - 18.0 g/dL 12.3 (L) 12.3 (L) 11.2 (L) 11.1 (L) 10.8 (L) 11.9 (L)   Hematocrit Latest Ref Range: 40.0 - 54.0 % 38.2 36.8 (L) 34.3 (L) 33.4 (L) 33.4 (L) 38.2 (L)   MCV Latest Ref Range: 82 - 98 fL 99 (H) 91 95 93 93 92     Results for NILSON FUNES (MRN [id number]) as of 4/30/2019 20:13   Ref. Range 9/29/2017 14:24 8/9/2018 15:11 2/12/2019 14:22   Iron Latest Ref Range: 45 - 160 ug/dL 51 67 53   TIBC Latest Ref Range: 250 - 450 ug/dL 295 327 369   Saturated Iron Latest Ref Range: 20 - 50 %  20 14 (L)   UIBC Latest Ref Range: 111 - 343 ug/dL 244     Transferrin Latest Ref Range: 200 - 375 mg/dL  221 249   Ferritin Latest Ref Range: 20.0 - 300.0 ng/mL 648 (H) 468 (H) 318 (H)   Folate Latest Ref Range: 4.0 - 24.0 ng/mL   8.2   Vitamin B-12 Latest Ref Range: 210 - 950 pg/mL   478   Iron Saturation Latest Ref Range: 15 - 55 % 17           PTA Medications   Medication Sig    albuterol (VENTOLIN HFA) 90 mcg/actuation inhaler Inhale 2 puffs into the lungs every 6 (six) hours as needed for Wheezing. Rescue    amLODIPine (NORVASC) 5 MG tablet Take 5 mg by mouth once daily.    atenolol (TENORMIN) 50 MG tablet Take 50 mg by mouth once daily.    atorvastatin (LIPITOR) 40 MG tablet TAKE 1 TABLET(40 MG) BY MOUTH EVERY DAY    ELIQUIS 5 mg Tab Take 1 tablet by mouth 2 (two) times daily.    fluticasone (FLONASE) 50 mcg/actuation nasal spray 1 spray (50 mcg total) by Each Nare route 2 (two) times daily as needed for Rhinitis or Allergies.    indapamide (LOZOL) 1.25 MG Tab TAKE 1 TABLET BY MOUTH EVERY MORNING    metFORMIN (GLUCOPHAGE) 500 MG tablet TAKE 1 TABLET BY MOUTH TWICE DAILY    multivitamin (ONE DAILY MULTIVITAMIN) per tablet Take 1 tablet  by mouth once daily.    sodium chloride (OCEAN NASAL) 0.65 % nasal spray 1 spray by Nasal route as needed for Congestion.    telmisartan (MICARDIS) 40 MG Tab Take 1 tablet (40 mg total) by mouth once daily.    blood sugar diagnostic (BLOOD GLUCOSE TEST) Strp by Misc.(Non-Drug; Combo Route) route.    lancets (EASY TOUCH LANCETS) 28 gauge Misc by Misc.(Non-Drug; Combo Route) route.    nitroGLYCERIN (NITROSTAT) 0.4 MG SL tablet DISSOLVE ONE T SUBLINGUALLY Q 5 MINUTES X 3 DOSES PRN FOR CHEST PAIN       Review of patient's allergies indicates:  No Known Allergies     Past Medical History:   Diagnosis Date    Anemia     Anxiety     Atrial fibrillation     [facility] for AF w RVR; no cardioversion needed; Dr Mendes EPS. Slowed down on its own and back to NSR; placed on eliquis.     Carotid artery plaque     bilateral    Diabetes mellitus, type 2     Essential hypertension     Hypertension     Hypothyroidism     Liver dysfunction     Methylenetetrahydrofolate reductase (MTHFR) gene mutation     Mixed hyperlipidemia     Obesity     ROBBY (obstructive sleep apnea)     Proteinuria     Valvular heart disease      Past Surgical History:   Procedure Laterality Date    APPENDECTOMY  [id number]    CORONARY STENT PLACEMENT  2017    LAD by Dr Ward     Family History   Problem Relation Age of Onset    Cancer Mother     Heart disease Mother     Heart disease Father     Arthritis Father     Hypertension Father     Cancer Sister         breast cancer    Diabetes Sister     Asthma Brother     COPD Brother     Diabetes Brother     Stroke Brother     Cancer Sister         leukemia    Early death Sister          at 64 of leukemia    Colon cancer Neg Hx     Crohn's disease Neg Hx     Esophageal cancer Neg Hx     Ulcerative colitis Neg Hx     Stomach cancer Neg Hx      Social History     Tobacco Use    Smoking status: Never Smoker    Smokeless tobacco: Never Used   Substance Use  "Topics    Alcohol use: Yes     Alcohol/week: 9.6 oz     Types: 8 Glasses of wine, 8 Shots of liquor per week    Drug use: No         OBJECTIVE:     Vital Signs (Most Recent)  Temp: 98.1 °F (36.7 °C) (05/01/19 1037)  Pulse: 65 (05/01/19 1037)  Resp: 16 (05/01/19 1037)  BP: (!) 185/76 (05/01/19 1037)  SpO2: 95 % (05/01/19 1037)    Physical Exam:  :Ht 5' 10" (1.778 m)   Wt 98.1 kg (216 lb 4.3 oz)   BMI 31.03 kg/m²                     GENERAL:  Comfortable, in no acute distress.                                 HEENT EXAM:  Nonicteric.  No adenopathy.  Oropharynx is clear.               NECK:  Supple.                                                               LUNGS:  Clear.                                                               CARDIAC:  Regular rate and rhythm.  S1, S2.  No murmur.                      ABDOMEN:  Soft, positive bowel sounds, nontender.  No hepatosplenomegaly or masses.  No rebound or guarding.                                             EXTREMITIES:  No edema.     MENTAL STATUS:  Alert and oriented.    ASSESSMENT/PLAN:     Assessment:  Positive FIT.  Anemia.    Plan: Gastroscopy and Colonoscopy    Anesthesia Plan:   MAC / General Anaesthesia    ASA Grade: ASA 2 - Patient with mild systemic disease with no functional limitations    MALLAMPATI SCORE: II (hard and soft palate, upper portion of tonsils anduvula visible)  "

## 2019-05-01 NOTE — DISCHARGE INSTRUCTIONS
Recovery After Procedural Sedation (Adult)  You have been given medicine by vein to make you sleep during your surgery. This may have included both a pain medicine and sleeping medicine. Most of the effects have worn off. But you may still have some drowsiness for the next 6 to 8 hours.  Home care  Follow these guidelines when you get home:  · For the next 8 hours, you should be watched by a responsible adult. This person should make sure your condition is not getting worse.  · Don't drink any alcohol for the next 24 hours.  · Don't drive, operate dangerous machinery, or make important business or personal decisions during the next 24 hours.  Note: Your healthcare provider may tell you not to take any medicine by mouth for pain or sleep in the next 4 hours. These medicines may react with the medicines you were given in the hospital. This could cause a much stronger response than usual.  Follow-up care  Follow up with your healthcare provider if you are not alert and back to your usual level of activity within 12 hours.  When to seek medical advice  Call your healthcare provider right away if any of these occur:  · Drowsiness gets worse  · Weakness or dizziness gets worse  · Repeated vomiting  · You can't be awakened   Date Last Reviewed: 10/18/2016  © 6752-2747 [employer]. [address] [place] [address], [place] [address]. All rights reserved. This information is not intended as a substitute for professional medical care. Always follow your healthcare professional's instructions.        PROBIOTICS:  Now that your colon is so cleaned out, now is a good time for a round of PROBIOTICS.  Take a Probiotic product such as Align or Culturelle or Deanne-Q, every day for a month.                  (The products listed are non-prescription, but you may need to ask the pharmacist for their location.)  Repeat this at least 5-6 times a year.        High-Fiber Diet  Fiber is in fruits, vegetables, cereals, and grains.  Fiber passes through your body undigested. A high-fiber diet helps food move through your intestinal tract. The added bulk is helpful in preventing constipation. In people with diverticulosis, fiber helps clean out the pouches along the colon wall. It also prevents new pouches from forming. A high-fiber diet reduces the risk of colon cancer. It also lowers blood cholesterol and prevents high blood sugar in people with diabetes.    The fiber-rich foods listed below should be part of your diet. If you are not used to high-fiber foods, start with 1 or 2 foods from this list. Every 3 to 4 days add a new one to your diet. Do this until you are eating 4 high-fiber foods per day. This should give you 20 to 35 grams of fiber a day. It is also important to drink a lot of water when you are on this diet. You should have 6 to 8 glasses of water a day. Water makes the fiber swell and increases the benefit.  Foods high in dietary fiber  The following foods are high in dietary fiber:  · Breads. Breads made with 100% whole-wheat flour; jameson, wheat, or rye crackers; whole-grain tortillas, bran muffins.  · Cereals. Whole-grain and bran cereals with bran (shredded wheat, wheat flakes, raisin bran, corn bran); oatmeal, rolled oats, granola, and brown rice.  · Fruits. Fresh fruits and their edible skins (pears, prunes, raisins, berries, apples, and apricots); bananas, citrus fruit, mangoes, pineapple; and prune juice.  · Nuts. Any nuts and seeds.  · Vegetables. Best served raw or lightly cooked. All types, especially: green peas, celery, eggplant, potatoes, spinach, broccoli, [place] sprouts, winter squash, carrots, cauliflower, soybeans, lentils, and fresh and dried beans of all kinds.  · Other. Popcorn, any spices.  Date Last Reviewed: 8/1/2016  © 3540-1563 [employer]. [address] [place] [address], [place] [address]. All rights reserved. This information is not intended as a substitute for professional medical care.  Always follow your healthcare professional's instructions.

## 2019-05-01 NOTE — [employer] PATIENT INSTRUCTIONS
Discharge Summary/Instructions after an Endoscopic Procedure  Patient Name: Jadiel Rossi  Patient MRN: [id number]  Patient YOB: 1945  Wednesday, May 01, 2019  Jimenez Solomon MD  RESTRICTIONS:  During your procedure today, you received medications for sedation.  These   medications may affect your judgment, balance and coordination.  Therefore,   for 24 hours, you have the following restrictions:   - DO NOT drive a car, operate machinery, make legal/financial decisions,   sign important papers or drink alcohol.    ACTIVITY:  Today: no heavy lifting, straining or running due to procedural   sedation/anesthesia.  The following day: return to full activity including work.  DIET:  Eat and drink normally unless instructed otherwise.     TREATMENT FOR COMMON SIDE EFFECTS:  - Mild abdominal pain, nausea, belching, bloating or excessive gas:  rest,   eat lightly and use a heating pad.  - Sore Throat: treat with throat lozenges and/or gargle with warm salt   water.  - Because air was used during the procedure, expelling large amounts of air   from your rectum or belching is normal.  - If a bowel prep was taken, you may not have a bowel movement for 1-3 days.    This is normal.  SYMPTOMS TO WATCH FOR AND REPORT TO YOUR PHYSICIAN:  1. Abdominal pain or bloating, other than gas cramps.  2. Chest pain.  3. Back pain.  4. Signs of infection such as: chills or fever occurring within 24 hours   after the procedure.  5. Rectal bleeding, which would show as bright red, maroon, or black stools.   (A tablespoon of blood from the rectum is not serious, especially if   hemorrhoids are present.)  6. Vomiting.  7. Weakness or dizziness.  GO DIRECTLY TO THE NEAREST EMERGENCY ROOM IF YOU HAVE ANY OF THE FOLLOWING:      Difficulty breathing              Chills and/or fever over 101 F   Persistent vomiting and/or vomiting blood   Severe abdominal pain   Severe chest pain   Black, tarry stools   Bleeding- more than one  tablespoon   Any other symptom or condition that you feel may need urgent attention  Your doctor recommends these additional instructions:  If any biopsies were taken, your doctors clinic will contact you in 1 to 2   weeks with any results.  Your physician has recommended a colonoscopy as previously scheduled.   You are being discharged to home.   We are waiting for your pathology results.   Take Protonix (pantoprazole) 40 mg by mouth once a day for two months.   Return to your primary care physician as previously scheduled.  For questions, problems or results please call your physician - Jimenez Solomon MD at Work:  [phone].  EMERGENCY PHONE NUMBER: [phone], LAB RESULTS: [phone]  IF A COMPLICATION OR EMERGENCY SITUATION ARISES AND YOU ARE UNABLE TO REACH   YOUR PHYSICIAN - GO DIRECTLY TO THE EMERGENCY ROOM.  ___________________________________________  Nurse Signature  ___________________________________________  Patient/Designated Responsible Party Signature  Jimenez Solomon MD  5/1/2019 12:28:25 PM  This report has been verified and signed electronically.  [employer]

## 2019-05-01 NOTE — ANESTHESIA POSTPROCEDURE EVALUATION
Anesthesia Post Evaluation    Patient: Jadiel Rossi    Procedure(s) Performed: Procedure(s) (LRB):  EGD (ESOPHAGOGASTRODUODENOSCOPY) (N/A)  COLONOSCOPY (N/A)    Final Anesthesia Type: general  Patient location during evaluation: PACU  Patient participation: Yes- Able to Participate  Level of consciousness: awake and alert, oriented and awake  Post-procedure vital signs: reviewed and stable  Pain management: adequate  Airway patency: patent  PONV status at discharge: No PONV  Anesthetic complications: no      Cardiovascular status: blood pressure returned to baseline and hemodynamically stable  Respiratory status: unassisted, spontaneous ventilation and room air  Hydration status: euvolemic  Follow-up not needed.          Vitals Value Taken Time   /60 5/1/2019 12:30 PM   Temp 36.8 °C (98.2 °F) 5/1/2019 12:20 PM   Pulse 64 5/1/2019 12:30 PM   Resp 11 5/1/2019 12:30 PM   SpO2 91 % 5/1/2019 12:30 PM         No case tracking events are documented in the log.      Pain/Rikki Score: Rikki Score: 5 (5/1/2019 12:30 PM)

## 2019-05-02 ENCOUNTER — TELEPHONE (OUTPATIENT)
Dept: ENDOSCOPY | Facility: HOSPITAL | Age: 74
End: 2019-05-02

## 2019-05-02 LAB — O+P STL TRI STN: NORMAL

## 2019-05-03 LAB
E COLI SXT1 STL QL IA: NEGATIVE
E COLI SXT2 STL QL IA: NEGATIVE

## 2019-05-06 LAB — BACTERIA STL CULT: NORMAL

## 2019-05-07 RX ORDER — LISINOPRIL 20 MG/1
TABLET ORAL
Qty: 180 TABLET | Refills: 0 | OUTPATIENT
Start: 2019-05-07

## 2019-05-07 RX ORDER — ALBUTEROL SULFATE 90 UG/1
AEROSOL, METERED RESPIRATORY (INHALATION)
Qty: 18 G | Refills: 2 | Status: SHIPPED | OUTPATIENT
Start: 2019-05-07 | End: 2019-08-26 | Stop reason: SDUPTHER

## 2019-05-22 DIAGNOSIS — I10 ESSENTIAL HYPERTENSION: ICD-10-CM

## 2019-05-23 NOTE — TELEPHONE ENCOUNTER
Pt Of Eliud Chandler MD    Last seen on: 2/14/2019  Next appt: 6/6/2019  Last refill: 4/23/2019    [facility] [address] [place] [address] [facility] [address] [address]  Phone: [phone] Fax: [phone]    Please review! Thank you!

## 2019-05-25 RX ORDER — INDAPAMIDE 1.25 MG/1
1.25 TABLET ORAL EVERY MORNING
Qty: 30 TABLET | Refills: 1 | Status: SHIPPED | OUTPATIENT
Start: 2019-05-25 | End: 2019-05-26 | Stop reason: SDUPTHER

## 2019-05-25 RX ORDER — METFORMIN HYDROCHLORIDE 500 MG/1
TABLET ORAL
Qty: 180 TABLET | Refills: 1 | Status: SHIPPED | OUTPATIENT
Start: 2019-05-25 | End: 2019-11-21 | Stop reason: SDUPTHER

## 2019-05-26 DIAGNOSIS — I10 ESSENTIAL HYPERTENSION: ICD-10-CM

## 2019-05-26 RX ORDER — INDAPAMIDE 1.25 MG/1
TABLET ORAL
Qty: 90 TABLET | Refills: 1 | Status: SHIPPED | OUTPATIENT
Start: 2019-05-26 | End: 2019-06-27

## 2019-05-27 NOTE — TELEPHONE ENCOUNTER
Called pt. No answer. Left voicemail to let pt know his medications were sent over to his preferred pharmacy.

## 2019-06-13 ENCOUNTER — PATIENT OUTREACH (OUTPATIENT)
Dept: ADMINISTRATIVE | Facility: HOSPITAL | Age: 74
End: 2019-06-13

## 2019-06-14 DIAGNOSIS — E11.9 TYPE 2 DIABETES MELLITUS WITHOUT COMPLICATION, UNSPECIFIED WHETHER LONG TERM INSULIN USE: ICD-10-CM

## 2019-06-24 ENCOUNTER — LAB VISIT (OUTPATIENT)
Dept: LAB | Facility: HOSPITAL | Age: 74
End: 2019-06-24
Attending: INTERNAL MEDICINE
Payer: MEDICARE

## 2019-06-24 DIAGNOSIS — E78.2 MIXED HYPERLIPIDEMIA: ICD-10-CM

## 2019-06-24 DIAGNOSIS — Z95.5 S/P CORONARY ARTERY STENT PLACEMENT: ICD-10-CM

## 2019-06-24 DIAGNOSIS — E11.9 TYPE 2 DIABETES MELLITUS WITHOUT COMPLICATION, WITHOUT LONG-TERM CURRENT USE OF INSULIN: ICD-10-CM

## 2019-06-24 DIAGNOSIS — I10 ESSENTIAL HYPERTENSION: ICD-10-CM

## 2019-06-24 DIAGNOSIS — E03.9 BORDERLINE HYPOTHYROIDISM: ICD-10-CM

## 2019-06-24 DIAGNOSIS — E66.09 CLASS 1 OBESITY DUE TO EXCESS CALORIES WITH SERIOUS COMORBIDITY AND BODY MASS INDEX (BMI) OF 30.0 TO 30.9 IN ADULT: ICD-10-CM

## 2019-06-24 DIAGNOSIS — G47.33 OSA (OBSTRUCTIVE SLEEP APNEA): ICD-10-CM

## 2019-06-24 LAB
ALBUMIN SERPL BCP-MCNC: 3.9 G/DL (ref 3.5–5.2)
ALP SERPL-CCNC: 59 U/L (ref 55–135)
ALT SERPL W/O P-5'-P-CCNC: 30 U/L (ref 10–44)
ANION GAP SERPL CALC-SCNC: 13 MMOL/L (ref 8–16)
AST SERPL-CCNC: 27 U/L (ref 10–40)
BASOPHILS # BLD AUTO: 0.07 K/UL (ref 0–0.2)
BASOPHILS NFR BLD: 0.9 % (ref 0–1.9)
BILIRUB SERPL-MCNC: 0.5 MG/DL (ref 0.1–1)
BUN SERPL-MCNC: 19 MG/DL (ref 8–23)
CALCIUM SERPL-MCNC: 10.4 MG/DL (ref 8.7–10.5)
CHLORIDE SERPL-SCNC: 98 MMOL/L (ref 95–110)
CHOLEST SERPL-MCNC: 159 MG/DL (ref 120–199)
CHOLEST/HDLC SERPL: 4.3 {RATIO} (ref 2–5)
CO2 SERPL-SCNC: 28 MMOL/L (ref 23–29)
CREAT SERPL-MCNC: 1.2 MG/DL (ref 0.5–1.4)
DIFFERENTIAL METHOD: ABNORMAL
EOSINOPHIL # BLD AUTO: 0.5 K/UL (ref 0–0.5)
EOSINOPHIL NFR BLD: 6.3 % (ref 0–8)
ERYTHROCYTE [DISTWIDTH] IN BLOOD BY AUTOMATED COUNT: 12.9 % (ref 11.5–14.5)
EST. GFR  (AFRICAN AMERICAN): >60 ML/MIN/1.73 M^2
EST. GFR  (NON AFRICAN AMERICAN): >60 ML/MIN/1.73 M^2
ESTIMATED AVG GLUCOSE: 131 MG/DL (ref 68–131)
GLUCOSE SERPL-MCNC: 116 MG/DL (ref 70–110)
HBA1C MFR BLD HPLC: 6.2 % (ref 4–5.6)
HCT VFR BLD AUTO: 39 % (ref 40–54)
HDLC SERPL-MCNC: 37 MG/DL (ref 40–75)
HDLC SERPL: 23.3 % (ref 20–50)
HGB BLD-MCNC: 12.4 G/DL (ref 14–18)
IMM GRANULOCYTES # BLD AUTO: 0.02 K/UL (ref 0–0.04)
IMM GRANULOCYTES NFR BLD AUTO: 0.2 % (ref 0–0.5)
LDLC SERPL CALC-MCNC: ABNORMAL MG/DL (ref 63–159)
LYMPHOCYTES # BLD AUTO: 2.4 K/UL (ref 1–4.8)
LYMPHOCYTES NFR BLD: 29.3 % (ref 18–48)
MAGNESIUM SERPL-MCNC: 1.4 MG/DL (ref 1.6–2.6)
MCH RBC QN AUTO: 30.7 PG (ref 27–31)
MCHC RBC AUTO-ENTMCNC: 31.8 G/DL (ref 32–36)
MCV RBC AUTO: 97 FL (ref 82–98)
MONOCYTES # BLD AUTO: 0.7 K/UL (ref 0.3–1)
MONOCYTES NFR BLD: 8.9 % (ref 4–15)
NEUTROPHILS # BLD AUTO: 4.5 K/UL (ref 1.8–7.7)
NEUTROPHILS NFR BLD: 54.4 % (ref 38–73)
NONHDLC SERPL-MCNC: 122 MG/DL
NRBC BLD-RTO: 0 /100 WBC
PLATELET # BLD AUTO: 234 K/UL (ref 150–350)
PMV BLD AUTO: 11 FL (ref 9.2–12.9)
POTASSIUM SERPL-SCNC: 4.4 MMOL/L (ref 3.5–5.1)
PROT SERPL-MCNC: 7.6 G/DL (ref 6–8.4)
RBC # BLD AUTO: 4.04 M/UL (ref 4.6–6.2)
SODIUM SERPL-SCNC: 139 MMOL/L (ref 136–145)
T3FREE SERPL-MCNC: 3 PG/ML (ref 2.3–4.2)
T4 FREE SERPL-MCNC: 0.93 NG/DL (ref 0.71–1.51)
TRIGL SERPL-MCNC: 497 MG/DL (ref 30–150)
TSH SERPL DL<=0.005 MIU/L-ACNC: 7.91 UIU/ML (ref 0.4–4)
WBC # BLD AUTO: 8.22 K/UL (ref 3.9–12.7)

## 2019-06-24 PROCEDURE — 83036 HEMOGLOBIN GLYCOSYLATED A1C: CPT | Mod: HCNC

## 2019-06-24 PROCEDURE — 85025 COMPLETE CBC W/AUTO DIFF WBC: CPT | Mod: HCNC

## 2019-06-24 PROCEDURE — 80053 COMPREHEN METABOLIC PANEL: CPT | Mod: HCNC

## 2019-06-24 PROCEDURE — 84481 FREE ASSAY (FT-3): CPT | Mod: HCNC

## 2019-06-24 PROCEDURE — 84443 ASSAY THYROID STIM HORMONE: CPT | Mod: HCNC

## 2019-06-24 PROCEDURE — 83735 ASSAY OF MAGNESIUM: CPT | Mod: HCNC

## 2019-06-24 PROCEDURE — 36415 COLL VENOUS BLD VENIPUNCTURE: CPT | Mod: HCNC,PN

## 2019-06-24 PROCEDURE — 80061 LIPID PANEL: CPT | Mod: HCNC

## 2019-06-24 PROCEDURE — 84439 ASSAY OF FREE THYROXINE: CPT | Mod: HCNC

## 2019-06-27 ENCOUNTER — OFFICE VISIT (OUTPATIENT)
Dept: FAMILY MEDICINE | Facility: CLINIC | Age: 74
End: 2019-06-27
Payer: MEDICARE

## 2019-06-27 VITALS
HEIGHT: 70 IN | HEART RATE: 72 BPM | DIASTOLIC BLOOD PRESSURE: 66 MMHG | SYSTOLIC BLOOD PRESSURE: 140 MMHG | BODY MASS INDEX: 32.17 KG/M2 | WEIGHT: 224.75 LBS

## 2019-06-27 DIAGNOSIS — Z95.5 S/P CORONARY ARTERY STENT PLACEMENT: ICD-10-CM

## 2019-06-27 DIAGNOSIS — E78.2 MIXED HYPERLIPIDEMIA: ICD-10-CM

## 2019-06-27 DIAGNOSIS — R60.0 PERIPHERAL EDEMA: ICD-10-CM

## 2019-06-27 DIAGNOSIS — G47.33 OSA (OBSTRUCTIVE SLEEP APNEA): ICD-10-CM

## 2019-06-27 DIAGNOSIS — E83.42 HYPOMAGNESEMIA: ICD-10-CM

## 2019-06-27 DIAGNOSIS — E11.9 TYPE 2 DIABETES MELLITUS WITHOUT COMPLICATION, WITHOUT LONG-TERM CURRENT USE OF INSULIN: ICD-10-CM

## 2019-06-27 DIAGNOSIS — E03.9 HYPOTHYROIDISM, UNSPECIFIED TYPE: ICD-10-CM

## 2019-06-27 DIAGNOSIS — I10 ESSENTIAL HYPERTENSION: Primary | ICD-10-CM

## 2019-06-27 DIAGNOSIS — Z15.89 METHYLENETETRAHYDROFOLATE REDUCTASE (MTHFR) GENE MUTATION: ICD-10-CM

## 2019-06-27 PROCEDURE — 99999 PR PBB SHADOW E&M-EST. PATIENT-LVL III: CPT | Mod: PBBFAC,HCNC,, | Performed by: INTERNAL MEDICINE

## 2019-06-27 PROCEDURE — 1101F PR PT FALLS ASSESS DOC 0-1 FALLS W/OUT INJ PAST YR: ICD-10-PCS | Mod: HCNC,CPTII,S$GLB, | Performed by: INTERNAL MEDICINE

## 2019-06-27 PROCEDURE — 3077F PR MOST RECENT SYSTOLIC BLOOD PRESSURE >= 140 MM HG: ICD-10-PCS | Mod: HCNC,CPTII,S$GLB, | Performed by: INTERNAL MEDICINE

## 2019-06-27 PROCEDURE — 3044F HG A1C LEVEL LT 7.0%: CPT | Mod: HCNC,CPTII,S$GLB, | Performed by: INTERNAL MEDICINE

## 2019-06-27 PROCEDURE — 99214 PR OFFICE/OUTPT VISIT, EST, LEVL IV, 30-39 MIN: ICD-10-PCS | Mod: HCNC,S$GLB,, | Performed by: INTERNAL MEDICINE

## 2019-06-27 PROCEDURE — 3044F PR MOST RECENT HEMOGLOBIN A1C LEVEL <7.0%: ICD-10-PCS | Mod: HCNC,CPTII,S$GLB, | Performed by: INTERNAL MEDICINE

## 2019-06-27 PROCEDURE — 3078F DIAST BP <80 MM HG: CPT | Mod: HCNC,CPTII,S$GLB, | Performed by: INTERNAL MEDICINE

## 2019-06-27 PROCEDURE — 99214 OFFICE O/P EST MOD 30 MIN: CPT | Mod: HCNC,S$GLB,, | Performed by: INTERNAL MEDICINE

## 2019-06-27 PROCEDURE — 99999 PR PBB SHADOW E&M-EST. PATIENT-LVL III: ICD-10-PCS | Mod: PBBFAC,HCNC,, | Performed by: INTERNAL MEDICINE

## 2019-06-27 PROCEDURE — 3078F PR MOST RECENT DIASTOLIC BLOOD PRESSURE < 80 MM HG: ICD-10-PCS | Mod: HCNC,CPTII,S$GLB, | Performed by: INTERNAL MEDICINE

## 2019-06-27 PROCEDURE — 3077F SYST BP >= 140 MM HG: CPT | Mod: HCNC,CPTII,S$GLB, | Performed by: INTERNAL MEDICINE

## 2019-06-27 PROCEDURE — 1101F PT FALLS ASSESS-DOCD LE1/YR: CPT | Mod: HCNC,CPTII,S$GLB, | Performed by: INTERNAL MEDICINE

## 2019-06-27 RX ORDER — INDAPAMIDE 2.5 MG/1
2.5 TABLET ORAL DAILY
Qty: 90 TABLET | Refills: 1 | Status: SHIPPED | OUTPATIENT
Start: 2019-06-27 | End: 2019-12-31

## 2019-06-27 RX ORDER — POTASSIUM CHLORIDE 750 MG/1
10 CAPSULE, EXTENDED RELEASE ORAL DAILY
Qty: 90 CAPSULE | Refills: 1 | Status: ON HOLD | OUTPATIENT
Start: 2019-06-27 | End: 2020-04-29 | Stop reason: HOSPADM

## 2019-06-27 RX ORDER — HYDROCHLOROTHIAZIDE 12.5 MG/1
12.5 CAPSULE ORAL DAILY
Qty: 90 CAPSULE | Refills: 1 | Status: SHIPPED | OUTPATIENT
Start: 2019-06-27 | End: 2019-06-27

## 2019-06-27 RX ORDER — LEVOTHYROXINE SODIUM 50 UG/1
50 TABLET ORAL DAILY
Qty: 90 TABLET | Refills: 1 | Status: SHIPPED | OUTPATIENT
Start: 2019-06-27 | End: 2020-09-24

## 2019-06-27 NOTE — PATIENT INSTRUCTIONS
Essential hypertension.Maintain < 2 Gm Na a day diet, and monitor BP at home; keep a log.  Continue telmisartan; increase lozol to 2.5 mg a day. On amlodipine as well.   -     Comprehensive metabolic panel; Future; Expected date: 06/27/2019  -     Lipid panel; Future; Expected date: 06/27/2019  -     Urinalysis; Future; Expected date: 06/27/2019  -     T3, free; Future; Expected date: 06/27/2019  -     T4, free; Future; Expected date: 06/27/2019  -     TSH; Future; Expected date: 06/27/2019    ROBBY (obstructive sleep apnea); intolerant of CPAP; exercise w weight reduction.     Type 2 diabetes mellitus without complication, without long-term current use of insulin. Maintain a low carb diet; monitor CBG's at home; keep a log for review. Needs to see his ophthalmologist for updated diabetic retinopathy screening.  Continue metformin 500 mg twice a day; include regular exercise in his weekly regimen as well as caloric restriction for weight reduction; watch diet closer. Exercise w weight reduction  -     Comprehensive metabolic panel; Future; Expected date: 06/27/2019    Mixed hyperlipidemia. Maintain low fat high fiber diet, exercise regularly.  Continue atorvastatin.  Add fish oil 1200 mg 2 a day to help his elevated triglyceride; he also needs to adhere to low-fat high-fiber diet more with pain more particular attention to dairy products and limitation of alcohol; doesn't want to increase atorvastatin. Goal LDL<60 w second stent to be placed. Will work on diet better and exercise.   -     Comprehensive metabolic panel; Future; Expected date: 06/27/2019  -     Lipid panel; Future; Expected date: 06/27/2019  -     T3, free; Future; Expected date: 06/27/2019  -     T4, free; Future; Expected date: 06/27/2019  -     TSH; Future; Expected date: 06/27/2019    S/P coronary artery stent placement; for second coronary stent to be placed by Dr Ward in July; earlier stent in LAD 4/25/17. Some KAUR noted by pt. Suspects new stent  to be in RCA branch. On eliquis for hx of AF.     Methylenetetrahydrofolate reductase (MTHFR) gene mutation; will add folate 400 mcg 2 p.o. daily and B12 500 mcg p.o. Daily; otc    Hypothyroidism, unspecified type:  With TSH increased to 7.9 and hyperlipidemia obesity diabetes and hypertension and low normal free T3 and free T4, feel patient should now be started on low-dose levothyroxine; will start patient on levothyroxine 50 mcg daily and repeat thyroid function test on follow-up along with lipid panel  -     T3, free; Future; Expected date: 06/27/2019  -     T4, free; Future; Expected date: 06/27/2019  -     TSH; Future; Expected date: 06/27/2019    Hypomagnesemia:  Start Mag-Ox 400 mg 2 p.o. daily OTC    Peripheral edema: Maintain less than 2 g sodium diet; elevate lower extremities at home; use compression stockings if possible.

## 2019-06-27 NOTE — PROGRESS NOTES
Subjective:       Patient ID: Jadiel Rossi is a 73 y.o. male.    Chief Complaint: Hypertension (140s-150s/60s at home)    HPI  for reassessment and go over his labs.  As well as reassessment as hypertension.  Hypertension:  Maintains less than 2 g sodium per day.  Blood pressure here is controlled at 140/66; blood pressure at home has been averaging- about same as here.   Obstructive sleep apnea: stressed importance of CPAP; however doesn't like it.     Obesity:  BMI 32.25; goal is to maintain a minimum 5 days exercise 25-30 minutes as well as caloric restriction to help his weight come down.  Mixed hyperlipidemia:  On low-fat high-fiber diet; tolerates atorvastatin fine.  Lipids have worsened.  Total cholesterol is 159; triglycerides have increased from 210 to 497; HDL is 37.  Will add fish oil 1200 mg 2 a day to his atorvastatin for better control of his triglycerides and asks patient to watch his dairy products a lot closer.  Needs include regular exercise and caloric restriction for weight reduction  As well as limitation of alcohol.    Diabetes mellitus type 2:  Patient tries to watch his carbohydrates adequately; medications include metformin 500 mg twice a day; hemoglobin A1c improved slightly to 6.2; fasting blood sugars 116 therapeutic. BS's at home 110-120.   Coronary artery stent placement:  No complaints of chest pain, or heart palpitations.  Patient has been having some dyspnea on exertion has seen his cardiologist Dr. gasca and is planned for insertion of a coronary stent this July.  His cardiologist is- Dr. RENITA Ward.  Has had exercise stress test and echocardiogram results are not available.  Patient with lower extremity edema noted lately but he has been off his diet and has been eating at [employer] a lot. Also may be from amlodipine as well.     Subclinical hypothyroidism:  TSH increased to 7.9 with free T3 and free T4 on the low normal side.  Will initiate low-dose Synthroid at 50 mcg per day  "in attempts to help both his weight and his lipid numbers; serial following of his thyroid function test    Review of Systems   Constitutional: Negative for appetite change and unexpected weight change.   HENT: Negative for congestion, postnasal drip, rhinorrhea and sinus pressure.         Seasonal allergies   Eyes: Negative for discharge and itching.   Respiratory: Negative for cough, chest tightness, shortness of breath and wheezing.    Cardiovascular: Positive for leg swelling. Negative for chest pain and palpitations.        Some dyspnea on exertion noted being worked up by his cardiologist   Gastrointestinal: Negative for abdominal pain, blood in stool, constipation, diarrhea, nausea and vomiting.   Endocrine: Negative for polydipsia, polyphagia and polyuria.   Genitourinary: Negative for dysuria and hematuria.   Musculoskeletal: Negative for arthralgias and myalgias.   Skin: Negative for rash.   Allergic/Immunologic: Negative for environmental allergies and food allergies.   Neurological: Negative for tremors, seizures and headaches.   Hematological: Negative for adenopathy. Does not bruise/bleed easily.   Psychiatric/Behavioral:        Denies anxiety or depression.       Objective:      Vitals:    06/27/19 0802   BP: (!) 140/66   Pulse: 72   Weight: 101.9 kg (224 lb 12.1 oz)   Height: 5' 10" (1.778 m)     Body mass index is 32.25 kg/m².    Physical Exam   Constitutional: He is oriented to person, place, and time. He appears well-developed and well-nourished.   HENT:   Head: Normocephalic and atraumatic.   Eyes: EOM are normal.   Neck: Normal range of motion. Neck supple. No thyromegaly present.   Cardiovascular: Normal rate, regular rhythm and normal heart sounds. Exam reveals no gallop.   No murmur heard.  Pulmonary/Chest: Effort normal and breath sounds normal. No respiratory distress. He has no wheezes. He has no rales.   Abdominal: Soft. Bowel sounds are normal. He exhibits no distension. There is no " tenderness. There is no rebound and no guarding.   Musculoskeletal: Normal range of motion. He exhibits edema.   Mild peripheral pitting edema estimated at 1+ no palpable calf tenderness   Lymphadenopathy:     He has no cervical adenopathy.   Neurological: He is alert and oriented to person, place, and time.   Moves all 4 extremities fine.   Skin: No rash noted.   Psychiatric: He has a normal mood and affect. His behavior is normal. Thought content normal.   Vitals reviewed.      Assessment:       1. Essential hypertension    2. ROBBY (obstructive sleep apnea)    3. Type 2 diabetes mellitus without complication, without long-term current use of insulin    4. Mixed hyperlipidemia    5. S/P coronary artery stent placement    6. Methylenetetrahydrofolate reductase (MTHFR) gene mutation    7. Hypothyroidism, unspecified type    8. Hypomagnesemia    9. Peripheral edema        Plan:       Essential hypertension.Maintain < 2 Gm Na a day diet, and monitor BP at home; keep a log.  Continue telmisartan; increase lozol to 2.5 mg a day. On amlodipine as well.   -     Comprehensive metabolic panel; Future; Expected date: 06/27/2019  -     Lipid panel; Future; Expected date: 06/27/2019  -     Urinalysis; Future; Expected date: 06/27/2019  -     T3, free; Future; Expected date: 06/27/2019  -     T4, free; Future; Expected date: 06/27/2019  -     TSH; Future; Expected date: 06/27/2019    ROBBY (obstructive sleep apnea); intolerant of CPAP; exercise w weight reduction.     Type 2 diabetes mellitus without complication, without long-term current use of insulin. Maintain a low carb diet; monitor CBG's at home; keep a log for review. Needs to see his ophthalmologist for updated diabetic retinopathy screening.  Continue metformin 500 mg twice a day; include regular exercise in his weekly regimen as well as caloric restriction for weight reduction; watch diet closer. Exercise w weight reduction  -     Comprehensive metabolic panel; Future;  Expected date: 06/27/2019    Mixed hyperlipidemia. Maintain low fat high fiber diet, exercise regularly.  Continue atorvastatin.  Add fish oil 1200 mg 2 a day to help his elevated triglyceride; he also needs to adhere to low-fat high-fiber diet more with pain more particular attention to dairy products and limitation of alcohol; doesn't want to increase atorvastatin. Goal LDL<60 w second stent to be placed. Will work on diet better and exercise. Beginning levothyroxine will help as well.   -     Comprehensive metabolic panel; Future; Expected date: 06/27/2019  -     Lipid panel; Future; Expected date: 06/27/2019  -     T3, free; Future; Expected date: 06/27/2019  -     T4, free; Future; Expected date: 06/27/2019  -     TSH; Future; Expected date: 06/27/2019    S/P coronary artery stent placement; for second coronary stent to be placed by Dr Ward in July; earlier stent in [facility] 4/25/17. Some KAUR noted by pt. Suspects new stent to be in RCA branch. On eliquis for hx of AF.     Methylenetetrahydrofolate reductase (MTHFR) gene mutation; will add folate 400 mcg 2 p.o. daily and B12 500 mcg p.o. Daily; otc    Hypothyroidism, unspecified type:  With TSH increased to 7.9 and hyperlipidemia obesity diabetes and hypertension and low normal free T3 and free T4, feel patient should now be started on low-dose levothyroxine; will start patient on levothyroxine 50 mcg daily and repeat thyroid function test on follow-up along with lipid panel  -     T3, free; Future; Expected date: 06/27/2019  -     T4, free; Future; Expected date: 06/27/2019  -     TSH; Future; Expected date: 06/27/2019    Hypomagnesemia:  Start Mag-Ox 400 mg 2 p.o. daily OTC    Peripheral edema: Maintain less than 2 g sodium diet; elevate lower extremities at home; use compression stockings if possible.

## 2019-07-01 ENCOUNTER — TELEPHONE (OUTPATIENT)
Dept: ADMINISTRATIVE | Facility: HOSPITAL | Age: 74
End: 2019-07-01

## 2019-08-11 DIAGNOSIS — I10 ESSENTIAL HYPERTENSION: ICD-10-CM

## 2019-08-12 RX ORDER — TELMISARTAN 40 MG/1
TABLET ORAL
Qty: 90 TABLET | Refills: 1 | Status: ON HOLD | OUTPATIENT
Start: 2019-08-12 | End: 2020-04-26

## 2019-08-20 ENCOUNTER — OFFICE VISIT (OUTPATIENT)
Dept: FAMILY MEDICINE | Facility: CLINIC | Age: 74
End: 2019-08-20
Payer: MEDICARE

## 2019-08-20 VITALS
BODY MASS INDEX: 31.66 KG/M2 | WEIGHT: 221.13 LBS | OXYGEN SATURATION: 96 % | HEART RATE: 60 BPM | SYSTOLIC BLOOD PRESSURE: 162 MMHG | HEIGHT: 70 IN | TEMPERATURE: 98 F | DIASTOLIC BLOOD PRESSURE: 54 MMHG

## 2019-08-20 DIAGNOSIS — E11.9 TYPE 2 DIABETES MELLITUS WITHOUT COMPLICATION, WITHOUT LONG-TERM CURRENT USE OF INSULIN: ICD-10-CM

## 2019-08-20 DIAGNOSIS — Z79.899 LONG-TERM USE OF HIGH-RISK MEDICATION: ICD-10-CM

## 2019-08-20 DIAGNOSIS — R60.0 EDEMA OF RIGHT LOWER EXTREMITY: ICD-10-CM

## 2019-08-20 DIAGNOSIS — I48.91 ATRIAL FIBRILLATION, UNSPECIFIED TYPE: ICD-10-CM

## 2019-08-20 DIAGNOSIS — S80.01XA TRAUMATIC HEMATOMA OF RIGHT KNEE, INITIAL ENCOUNTER: ICD-10-CM

## 2019-08-20 DIAGNOSIS — M25.561 ACUTE PAIN OF RIGHT KNEE: Primary | ICD-10-CM

## 2019-08-20 DIAGNOSIS — I10 ESSENTIAL HYPERTENSION: ICD-10-CM

## 2019-08-20 DIAGNOSIS — R60.0 BILATERAL LOWER EXTREMITY EDEMA: ICD-10-CM

## 2019-08-20 DIAGNOSIS — M17.11 ARTHRITIS OF RIGHT KNEE: ICD-10-CM

## 2019-08-20 PROCEDURE — 3077F PR MOST RECENT SYSTOLIC BLOOD PRESSURE >= 140 MM HG: ICD-10-PCS | Mod: HCNC,CPTII,S$GLB, | Performed by: INTERNAL MEDICINE

## 2019-08-20 PROCEDURE — 3077F SYST BP >= 140 MM HG: CPT | Mod: HCNC,CPTII,S$GLB, | Performed by: INTERNAL MEDICINE

## 2019-08-20 PROCEDURE — 3078F DIAST BP <80 MM HG: CPT | Mod: HCNC,CPTII,S$GLB, | Performed by: INTERNAL MEDICINE

## 2019-08-20 PROCEDURE — 99999 PR PBB SHADOW E&M-EST. PATIENT-LVL IV: CPT | Mod: PBBFAC,HCNC,, | Performed by: INTERNAL MEDICINE

## 2019-08-20 PROCEDURE — 1101F PT FALLS ASSESS-DOCD LE1/YR: CPT | Mod: HCNC,CPTII,S$GLB, | Performed by: INTERNAL MEDICINE

## 2019-08-20 PROCEDURE — 3044F HG A1C LEVEL LT 7.0%: CPT | Mod: HCNC,CPTII,S$GLB, | Performed by: INTERNAL MEDICINE

## 2019-08-20 PROCEDURE — 3078F PR MOST RECENT DIASTOLIC BLOOD PRESSURE < 80 MM HG: ICD-10-PCS | Mod: HCNC,CPTII,S$GLB, | Performed by: INTERNAL MEDICINE

## 2019-08-20 PROCEDURE — 99999 PR PBB SHADOW E&M-EST. PATIENT-LVL IV: ICD-10-PCS | Mod: PBBFAC,HCNC,, | Performed by: INTERNAL MEDICINE

## 2019-08-20 PROCEDURE — 99214 OFFICE O/P EST MOD 30 MIN: CPT | Mod: HCNC,S$GLB,, | Performed by: INTERNAL MEDICINE

## 2019-08-20 PROCEDURE — 99214 PR OFFICE/OUTPT VISIT, EST, LEVL IV, 30-39 MIN: ICD-10-PCS | Mod: HCNC,S$GLB,, | Performed by: INTERNAL MEDICINE

## 2019-08-20 PROCEDURE — 1101F PR PT FALLS ASSESS DOC 0-1 FALLS W/OUT INJ PAST YR: ICD-10-PCS | Mod: HCNC,CPTII,S$GLB, | Performed by: INTERNAL MEDICINE

## 2019-08-20 PROCEDURE — 3044F PR MOST RECENT HEMOGLOBIN A1C LEVEL <7.0%: ICD-10-PCS | Mod: HCNC,CPTII,S$GLB, | Performed by: INTERNAL MEDICINE

## 2019-08-20 RX ORDER — CLOPIDOGREL BISULFATE 75 MG/1
TABLET ORAL
Refills: 3 | Status: ON HOLD | COMMUNITY
Start: 2019-07-05 | End: 2020-04-29 | Stop reason: SDUPTHER

## 2019-08-20 RX ORDER — HYDROCHLOROTHIAZIDE 12.5 MG/1
CAPSULE ORAL
Refills: 1 | COMMUNITY
Start: 2019-06-27 | End: 2020-03-03

## 2019-08-20 RX ORDER — CYCLOBENZAPRINE HCL 5 MG
5 TABLET ORAL 3 TIMES DAILY PRN
Qty: 30 TABLET | Refills: 1 | Status: ON HOLD | OUTPATIENT
Start: 2019-08-20 | End: 2020-04-26

## 2019-08-20 RX ORDER — CALCIUM CARBONATE/VITAMIN D3 500-10/5ML
400 LIQUID (ML) ORAL ONCE
Status: ON HOLD | COMMUNITY
End: 2020-04-26

## 2019-08-20 NOTE — PATIENT INSTRUCTIONS
Acute pain of right knee; cold applications for 2 days then thermal heat. To [facility] ER asap for workup to include knee Xrays and steven US RLE venous to r/o DVT. But pt declines ER evaluation and desires outpt workup. No NSAID's as on eliquis for AF. Non-weight bearing to his right lower extremity. Try and stay off his knee as much as possible. Tylenol arthritis for pain. Ortho consult to Dr Schreiber for further evaluation and treatment. Use crutches; stay off right knee as much as possible.   -     US Lower Extremity Veins Right; Future; Expected date: 08/20/2019  -     X-Ray Knee 3 View Right; Future; Expected date: 08/20/2019    Traumatic hematoma of right knee, initial encounter; cold applications for 2 days then thermal heat.   -     US Lower Extremity Veins Right; Future; Expected date: 08/20/2019  -     X-Ray Knee 3 View Right; Future; Expected date: 08/20/2019    Edema of right lower extremity.Maintain less than 2 g sodium diet; elevate lower extremities at home; use compression stockings if possible.  -     US Lower Extremity Veins Right; Future; Expected date: 08/20/2019  -     X-Ray Knee 3 View Right; Future; Expected date: 08/20/2019    Atrial fibrillation, unspecified type    Long-term use of high-risk medication; on eliquis for AF per cardiology Dr Ward.     Arthritis of right knee  -     X-Ray Knee 3 View Right; Future; Expected date: 08/20/2019    Steven LE edema; suspected from venous insufficiency. Maintain less than 2 g sodium diet; elevate lower extremities at home

## 2019-08-20 NOTE — PROGRESS NOTES
Subjective:       Patient ID: Jadiel Rossi is a 73 y.o. male.    Chief Complaint: right knee pain (swelling since Friday 08/16/2019, took ibuprofen last pm, didn't help)    HPI  Pt being seen today for acute onset right knee pain since Friday of last week. No hx of  fall noted or bite known; or any heavy lifting.  No long travel in plane, train, or car. No prior hx of DVT or PE. Pt does note banging his right knee against a drawer handle at school pretty hard on Thursday. Sx's started since then.  Reportedly Mag did pretty good and had no problems before that.  Patient is on Eliquis for atrial fibrillation of note.  R knee swelling has improved since friday; leg swelling has also improved some.  Has had baseline shraddha LE edema which has worsened in his RLE. No calf or thigh pain. No CP, SOB, or palpitations. No fever or chills noted. No hx of gout or pseudogout. No abd pain, or n/v/d. Can walk ok except knee is tender w decreased ROM to knee as well.  Edema also noted to the knee which suspect may be hematoma related as well as patient is on Eliquis. Walking helps his pain. Sitting and stretching knee makes it worse.  Have recommended application of cold packs for the next 48 hr then thermal heat applications and attempt to stay off his knee as much as possible and to use crutches so as to apply nonweightbearing to his right knee  Patient with diabetes mellitus type 2 with blood sugars averaging 116-120; have advised initially the patient go to the emergency room for further evaluation but declined.  Increase edema extends from his right knee down his right lower extremity compared to his left lower extremity.  Will also obtain x-ray views of his right knee, and right lower extremity venous ultrasound to rule out DVT. Patient with baseline bilateral lower extremity edema; suspect due to venous insufficiency; will place Orthopedics consult to Dr. Schreiber for further evaluation and treatment as well; patient also  "with hypertension and suspect increased due to pain  and discomfort as well as anxiety.    Review of Systems   Constitutional: Negative for activity change and unexpected weight change.   HENT: Negative for hearing loss, rhinorrhea and trouble swallowing.    Eyes: Negative for discharge and visual disturbance.   Respiratory: Negative for chest tightness and wheezing.    Cardiovascular: Negative for chest pain and palpitations.   Gastrointestinal: Negative for blood in stool, constipation, diarrhea and vomiting.   Endocrine: Negative for polydipsia and polyuria.   Genitourinary: Negative for difficulty urinating, hematuria and urgency.   Musculoskeletal: Positive for arthralgias and joint swelling. Negative for neck pain.   Neurological: Negative for weakness and headaches.   Psychiatric/Behavioral: Negative for confusion and dysphoric mood.       Objective:      Vitals:    08/20/19 1547   BP: (!) 162/54   BP Location: Left arm   Patient Position: Sitting   BP Method: Large (Manual)   Pulse: 60   Temp: 98.1 °F (36.7 °C)   TempSrc: Oral   SpO2: 96%   Weight: 100.3 kg (221 lb 1.9 oz)   Height: 5' 10" (1.778 m)     Body mass index is 31.73 kg/m².    Physical Exam   Constitutional: He is oriented to person, place, and time. He appears well-developed and well-nourished.   HENT:   Head: Normocephalic and atraumatic.   Eyes: EOM are normal.   Neck: Normal range of motion. Neck supple. No thyromegaly present.   Cardiovascular: Normal rate and regular rhythm. Exam reveals no gallop.   No murmur heard.  MARCO ANTONIO 2-3/^ aortic area.    Pulmonary/Chest: Effort normal and breath sounds normal. No respiratory distress. He has no wheezes. He has no rales.   Abdominal: Soft. Bowel sounds are normal. He exhibits no distension. There is no tenderness. There is no rebound and no guarding.   Musculoskeletal: Normal range of motion. He exhibits no edema.   Right knee w decreased ROM; erthematous w increase temperature. hard to flex 90 deg. No " c repitance; edema noted to knee; tender superiorly w patellar and inferiorly; LLE 2 + edema w no calf tenderness to palp; RLE w 2-3+ edema w no tenderness to palp; spider veins noted. No bite noted or tear of skin. Knee is tender and erythematous  w increased warmth.    Lymphadenopathy:     He has no cervical adenopathy.   Neurological: He is alert and oriented to person, place, and time.   Moves all 4 extremities fine.   Skin: No rash noted.   Psychiatric: He has a normal mood and affect. His behavior is normal. Thought content normal.   Vitals reviewed.      Assessment:       1. Acute pain of right knee    2. Traumatic hematoma of right knee, initial encounter    3. Edema of right lower extremity    4. Atrial fibrillation, unspecified type    5. Long-term use of high-risk medication    6. Arthritis of right knee    7. Bilateral lower extremity edema    8. Essential hypertension    9. Type 2 diabetes mellitus without complication, without long-term current use of insulin        Plan:       Acute pain of right knee; cold applications for 2 days then thermal heat. To [facility] ER asap for workup to include knee Xrays and shraddha US RLE venous to r/o DVT. But pt declines ER evaluation and desires outpt workup. No NSAID's as on eliquis for AF. Non-weight bearing to his right lower extremity. Use crutches. Try and stay off his Right knee as much as possible. Tylenol arthritis for pain. Ortho consult to Dr Schreiber for further evaluation and treatment.   -     US Lower Extremity Veins Right; Future; Expected date: 08/20/2019  -     X-Ray Knee 3 View Right; Future; Expected date: 08/20/2019    Traumatic hematoma of right knee, initial encounter; cold applications for 2 days then thermal heat.   -     US Lower Extremity Veins Right; Future; Expected date: 08/20/2019  -     X-Ray Knee 3 View Right; Future; Expected date: 08/20/2019    Edema of right lower extremity.Maintain less than 2 g sodium diet; elevate lower extremities at  home; use compression stockings if possible.  -     US Lower Extremity Veins Right; Future; Expected date: 08/20/2019  -     X-Ray Knee 3 View Right; Future; Expected date: 08/20/2019    Atrial fibrillation, unspecified type    Long-term use of high-risk medication; on eliquis for AF per cardiology Dr Ward.     Arthritis of right knee  -     X-Ray Knee 3 View Right; Future; Expected date: 08/20/2019    Steven LE edema; suspected from venous insufficiency. Maintain less than 2 g sodium diet; elevate lower extremities at home    Diabetes mellitus type 2, without complication a long-term use of insulin:  Presently controlled with blood sugars 116-120.    Essential hypertension; Maintain < 2 Gm Na a day diet, and monitor BP at home; keep a log.

## 2019-08-21 ENCOUNTER — TELEPHONE (OUTPATIENT)
Dept: ORTHOPEDICS | Facility: CLINIC | Age: 74
End: 2019-08-21

## 2019-08-23 ENCOUNTER — TELEPHONE (OUTPATIENT)
Dept: FAMILY MEDICINE | Facility: CLINIC | Age: 74
End: 2019-08-23

## 2019-08-23 NOTE — TELEPHONE ENCOUNTER
PA request rec'd from [employer](Fax [phone]) for Cyclobenzaprine 5MG #30, 1R    Phone [phone]  Pt ID [id number]    PA initiated via [email or web address]  Key: [id number]    Awaiting response.Rec'd fax from [employer]. Rx approved through 08/22/2021. Info faxed to pharmacy.

## 2019-08-26 DIAGNOSIS — Z95.5 S/P CORONARY ARTERY STENT PLACEMENT: ICD-10-CM

## 2019-08-26 DIAGNOSIS — E78.2 MIXED HYPERLIPIDEMIA: ICD-10-CM

## 2019-08-26 NOTE — TELEPHONE ENCOUNTER
Spoke w/ Mirela at [facility] , Pt has picked up rx for Flexeril . Pt paid cash on 8/20 .--lp

## 2019-08-26 NOTE — TELEPHONE ENCOUNTER
Please send me an e-mail back once prior authorization has been obtained for the medication and I will re-send the medication to his pharmacist; or obtain for me from his insurance company one of the muscle skeletal relaxants that they do cover so I can choose from 1 of those to send in.

## 2019-08-29 RX ORDER — ALBUTEROL SULFATE 90 UG/1
AEROSOL, METERED RESPIRATORY (INHALATION)
Qty: 18 G | Refills: 2 | Status: SHIPPED | OUTPATIENT
Start: 2019-08-29 | End: 2019-10-21 | Stop reason: SDUPTHER

## 2019-08-29 RX ORDER — ATORVASTATIN CALCIUM 40 MG/1
TABLET, FILM COATED ORAL
Qty: 90 TABLET | Refills: 2 | Status: SHIPPED | OUTPATIENT
Start: 2019-08-29 | End: 2020-06-30 | Stop reason: SDUPTHER

## 2019-09-13 ENCOUNTER — PATIENT OUTREACH (OUTPATIENT)
Dept: ADMINISTRATIVE | Facility: HOSPITAL | Age: 74
End: 2019-09-13

## 2019-10-23 RX ORDER — ALBUTEROL SULFATE 90 UG/1
AEROSOL, METERED RESPIRATORY (INHALATION)
Qty: 18 G | Refills: 2 | Status: ON HOLD | OUTPATIENT
Start: 2019-10-23 | End: 2020-04-29

## 2019-11-13 DIAGNOSIS — I10 ESSENTIAL HYPERTENSION: ICD-10-CM

## 2019-11-14 RX ORDER — TELMISARTAN 40 MG/1
TABLET ORAL
Qty: 90 TABLET | Refills: 0 | OUTPATIENT
Start: 2019-11-14

## 2019-11-22 RX ORDER — METFORMIN HYDROCHLORIDE 500 MG/1
TABLET ORAL
Qty: 180 TABLET | Refills: 3 | Status: SHIPPED | OUTPATIENT
Start: 2019-11-22 | End: 2020-03-03

## 2019-12-03 ENCOUNTER — PATIENT MESSAGE (OUTPATIENT)
Dept: FAMILY MEDICINE | Facility: CLINIC | Age: 74
End: 2019-12-03

## 2019-12-06 DIAGNOSIS — E11.9 TYPE 2 DIABETES MELLITUS WITHOUT COMPLICATION: ICD-10-CM

## 2019-12-30 DIAGNOSIS — R60.0 PERIPHERAL EDEMA: ICD-10-CM

## 2019-12-30 DIAGNOSIS — I10 ESSENTIAL HYPERTENSION: ICD-10-CM

## 2019-12-31 RX ORDER — INDAPAMIDE 2.5 MG/1
TABLET ORAL
Qty: 90 TABLET | Refills: 1 | Status: SHIPPED | OUTPATIENT
Start: 2019-12-31 | End: 2020-03-03

## 2020-01-16 ENCOUNTER — PATIENT MESSAGE (OUTPATIENT)
Dept: FAMILY MEDICINE | Facility: CLINIC | Age: 75
End: 2020-01-16

## 2020-02-22 PROCEDURE — G0180 MD CERTIFICATION HHA PATIENT: HCPCS | Mod: ,,, | Performed by: INTERNAL MEDICINE

## 2020-02-22 PROCEDURE — G0180 PR HOME HEALTH MD CERTIFICATION: ICD-10-PCS | Mod: ,,, | Performed by: INTERNAL MEDICINE

## 2020-02-28 ENCOUNTER — LAB VISIT (OUTPATIENT)
Dept: LAB | Facility: HOSPITAL | Age: 75
End: 2020-02-28
Attending: INTERNAL MEDICINE
Payer: MEDICARE

## 2020-02-28 DIAGNOSIS — E11.9 TYPE 2 DIABETES MELLITUS WITHOUT COMPLICATION, WITHOUT LONG-TERM CURRENT USE OF INSULIN: ICD-10-CM

## 2020-02-28 DIAGNOSIS — E78.2 MIXED HYPERLIPIDEMIA: ICD-10-CM

## 2020-02-28 DIAGNOSIS — I10 ESSENTIAL HYPERTENSION: ICD-10-CM

## 2020-02-28 DIAGNOSIS — E03.9 HYPOTHYROIDISM, UNSPECIFIED TYPE: ICD-10-CM

## 2020-02-28 LAB
ALBUMIN SERPL BCP-MCNC: 3.3 G/DL (ref 3.5–5.2)
ALP SERPL-CCNC: 72 U/L (ref 55–135)
ALT SERPL W/O P-5'-P-CCNC: 64 U/L (ref 10–44)
ANION GAP SERPL CALC-SCNC: 9 MMOL/L (ref 8–16)
AST SERPL-CCNC: 23 U/L (ref 10–40)
BILIRUB SERPL-MCNC: 0.8 MG/DL (ref 0.1–1)
BUN SERPL-MCNC: 32 MG/DL (ref 8–23)
CALCIUM SERPL-MCNC: 10.6 MG/DL (ref 8.7–10.5)
CHLORIDE SERPL-SCNC: 96 MMOL/L (ref 95–110)
CHOLEST SERPL-MCNC: 168 MG/DL (ref 120–199)
CHOLEST/HDLC SERPL: 4.4 {RATIO} (ref 2–5)
CO2 SERPL-SCNC: 35 MMOL/L (ref 23–29)
CREAT SERPL-MCNC: 1.8 MG/DL (ref 0.5–1.4)
EST. GFR  (AFRICAN AMERICAN): 42.2 ML/MIN/1.73 M^2
EST. GFR  (NON AFRICAN AMERICAN): 36.5 ML/MIN/1.73 M^2
ESTIMATED AVG GLUCOSE: 160 MG/DL (ref 68–131)
GLUCOSE SERPL-MCNC: 119 MG/DL (ref 70–110)
HBA1C MFR BLD HPLC: 7.2 % (ref 4–5.6)
HDLC SERPL-MCNC: 38 MG/DL (ref 40–75)
HDLC SERPL: 22.6 % (ref 20–50)
LDLC SERPL CALC-MCNC: 69.4 MG/DL (ref 63–159)
MAGNESIUM SERPL-MCNC: 1.8 MG/DL (ref 1.6–2.6)
NONHDLC SERPL-MCNC: 130 MG/DL
POTASSIUM SERPL-SCNC: 5.1 MMOL/L (ref 3.5–5.1)
PROT SERPL-MCNC: 7 G/DL (ref 6–8.4)
SODIUM SERPL-SCNC: 140 MMOL/L (ref 136–145)
T3FREE SERPL-MCNC: 2.5 PG/ML (ref 2.3–4.2)
T4 FREE SERPL-MCNC: 1.17 NG/DL (ref 0.71–1.51)
TRIGL SERPL-MCNC: 303 MG/DL (ref 30–150)
TSH SERPL DL<=0.005 MIU/L-ACNC: 4.84 UIU/ML (ref 0.4–4)

## 2020-02-28 PROCEDURE — 83735 ASSAY OF MAGNESIUM: CPT | Mod: HCNC

## 2020-02-28 PROCEDURE — 80061 LIPID PANEL: CPT | Mod: HCNC

## 2020-02-28 PROCEDURE — 84481 FREE ASSAY (FT-3): CPT | Mod: HCNC

## 2020-02-28 PROCEDURE — 80053 COMPREHEN METABOLIC PANEL: CPT | Mod: HCNC

## 2020-02-28 PROCEDURE — 36415 COLL VENOUS BLD VENIPUNCTURE: CPT | Mod: HCNC,PN

## 2020-02-28 PROCEDURE — 84439 ASSAY OF FREE THYROXINE: CPT | Mod: HCNC

## 2020-02-28 PROCEDURE — 84443 ASSAY THYROID STIM HORMONE: CPT | Mod: HCNC

## 2020-02-28 PROCEDURE — 83036 HEMOGLOBIN GLYCOSYLATED A1C: CPT | Mod: HCNC

## 2020-03-03 ENCOUNTER — OFFICE VISIT (OUTPATIENT)
Dept: FAMILY MEDICINE | Facility: CLINIC | Age: 75
End: 2020-03-03
Payer: MEDICARE

## 2020-03-03 VITALS
HEIGHT: 65 IN | WEIGHT: 199.94 LBS | TEMPERATURE: 99 F | SYSTOLIC BLOOD PRESSURE: 160 MMHG | BODY MASS INDEX: 33.31 KG/M2 | HEART RATE: 74 BPM | DIASTOLIC BLOOD PRESSURE: 60 MMHG | OXYGEN SATURATION: 97 %

## 2020-03-03 DIAGNOSIS — Z09 HOSPITAL DISCHARGE FOLLOW-UP: Primary | ICD-10-CM

## 2020-03-03 DIAGNOSIS — J18.9 COMMUNITY ACQUIRED PNEUMONIA, UNSPECIFIED LATERALITY: ICD-10-CM

## 2020-03-03 DIAGNOSIS — R06.02 SHORTNESS OF BREATH: ICD-10-CM

## 2020-03-03 DIAGNOSIS — I38 VALVULAR HEART DISEASE: ICD-10-CM

## 2020-03-03 DIAGNOSIS — R09.02 HYPOXIA: ICD-10-CM

## 2020-03-03 DIAGNOSIS — E78.2 MIXED HYPERLIPIDEMIA: ICD-10-CM

## 2020-03-03 DIAGNOSIS — E03.9 HYPOTHYROIDISM, UNSPECIFIED TYPE: ICD-10-CM

## 2020-03-03 DIAGNOSIS — E66.09 CLASS 1 OBESITY DUE TO EXCESS CALORIES WITH SERIOUS COMORBIDITY AND BODY MASS INDEX (BMI) OF 33.0 TO 33.9 IN ADULT: ICD-10-CM

## 2020-03-03 DIAGNOSIS — E78.5 DYSLIPIDEMIA: ICD-10-CM

## 2020-03-03 DIAGNOSIS — N28.9 ACUTE RENAL INSUFFICIENCY: ICD-10-CM

## 2020-03-03 DIAGNOSIS — G47.33 OSA (OBSTRUCTIVE SLEEP APNEA): ICD-10-CM

## 2020-03-03 DIAGNOSIS — Z95.5 S/P CORONARY ARTERY STENT PLACEMENT: ICD-10-CM

## 2020-03-03 DIAGNOSIS — J45.21 MILD INTERMITTENT ASTHMA WITH EXACERBATION: ICD-10-CM

## 2020-03-03 DIAGNOSIS — I10 ESSENTIAL HYPERTENSION: ICD-10-CM

## 2020-03-03 DIAGNOSIS — E11.9 TYPE 2 DIABETES MELLITUS WITHOUT COMPLICATION, WITHOUT LONG-TERM CURRENT USE OF INSULIN: ICD-10-CM

## 2020-03-03 PROCEDURE — 99215 OFFICE O/P EST HI 40 MIN: CPT | Mod: HCNC,S$GLB,, | Performed by: INTERNAL MEDICINE

## 2020-03-03 PROCEDURE — 1101F PR PT FALLS ASSESS DOC 0-1 FALLS W/OUT INJ PAST YR: ICD-10-PCS | Mod: HCNC,CPTII,S$GLB, | Performed by: INTERNAL MEDICINE

## 2020-03-03 PROCEDURE — 1101F PT FALLS ASSESS-DOCD LE1/YR: CPT | Mod: HCNC,CPTII,S$GLB, | Performed by: INTERNAL MEDICINE

## 2020-03-03 PROCEDURE — 99999 PR PBB SHADOW E&M-EST. PATIENT-LVL IV: ICD-10-PCS | Mod: PBBFAC,HCNC,, | Performed by: INTERNAL MEDICINE

## 2020-03-03 PROCEDURE — 3051F HG A1C>EQUAL 7.0%<8.0%: CPT | Mod: HCNC,CPTII,S$GLB, | Performed by: INTERNAL MEDICINE

## 2020-03-03 PROCEDURE — 3077F SYST BP >= 140 MM HG: CPT | Mod: HCNC,CPTII,S$GLB, | Performed by: INTERNAL MEDICINE

## 2020-03-03 PROCEDURE — 99499 UNLISTED E&M SERVICE: CPT | Mod: HCNC,S$GLB,, | Performed by: INTERNAL MEDICINE

## 2020-03-03 PROCEDURE — 99499 RISK ADDL DX/OHS AUDIT: ICD-10-PCS | Mod: HCNC,S$GLB,, | Performed by: INTERNAL MEDICINE

## 2020-03-03 PROCEDURE — 3051F PR MOST RECENT HEMOGLOBIN A1C LEVEL 7.0 - < 8.0%: ICD-10-PCS | Mod: HCNC,CPTII,S$GLB, | Performed by: INTERNAL MEDICINE

## 2020-03-03 PROCEDURE — 1159F MED LIST DOCD IN RCRD: CPT | Mod: HCNC,S$GLB,, | Performed by: INTERNAL MEDICINE

## 2020-03-03 PROCEDURE — 3078F PR MOST RECENT DIASTOLIC BLOOD PRESSURE < 80 MM HG: ICD-10-PCS | Mod: HCNC,CPTII,S$GLB, | Performed by: INTERNAL MEDICINE

## 2020-03-03 PROCEDURE — 99215 PR OFFICE/OUTPT VISIT, EST, LEVL V, 40-54 MIN: ICD-10-PCS | Mod: HCNC,S$GLB,, | Performed by: INTERNAL MEDICINE

## 2020-03-03 PROCEDURE — 99999 PR PBB SHADOW E&M-EST. PATIENT-LVL IV: CPT | Mod: PBBFAC,HCNC,, | Performed by: INTERNAL MEDICINE

## 2020-03-03 PROCEDURE — 3077F PR MOST RECENT SYSTOLIC BLOOD PRESSURE >= 140 MM HG: ICD-10-PCS | Mod: HCNC,CPTII,S$GLB, | Performed by: INTERNAL MEDICINE

## 2020-03-03 PROCEDURE — 1159F PR MEDICATION LIST DOCUMENTED IN MEDICAL RECORD: ICD-10-PCS | Mod: HCNC,S$GLB,, | Performed by: INTERNAL MEDICINE

## 2020-03-03 PROCEDURE — 3078F DIAST BP <80 MM HG: CPT | Mod: HCNC,CPTII,S$GLB, | Performed by: INTERNAL MEDICINE

## 2020-03-03 RX ORDER — METOPROLOL SUCCINATE 100 MG/1
TABLET, EXTENDED RELEASE ORAL
Status: ON HOLD | COMMUNITY
Start: 2020-01-29 | End: 2020-04-29 | Stop reason: HOSPADM

## 2020-03-03 RX ORDER — INDAPAMIDE 1.25 MG/1
1.25 TABLET ORAL DAILY
Qty: 90 TABLET | Refills: 1 | Status: SHIPPED | OUTPATIENT
Start: 2020-03-03 | End: 2020-09-24

## 2020-03-03 RX ORDER — FERROUS SULFATE 325(65) MG
TABLET ORAL
COMMUNITY
Start: 2020-02-19 | End: 2020-06-08

## 2020-03-03 RX ORDER — TIOTROPIUM BROMIDE 18 UG/1
CAPSULE ORAL; RESPIRATORY (INHALATION)
COMMUNITY
Start: 2020-02-18 | End: 2024-01-16

## 2020-03-03 RX ORDER — RANOLAZINE 500 MG/1
TABLET, EXTENDED RELEASE ORAL
Status: ON HOLD | COMMUNITY
Start: 2019-12-09 | End: 2020-04-26

## 2020-03-03 NOTE — PATIENT INSTRUCTIONS
Hospital discharge follow-up; doing a lot better since d/ch; no longer hypoxic and SOB has cleared.   -     X-Ray Chest PA And Lateral; Future; Expected date: 03/03/2020    Community acquired pneumonia, unspecified laterality; finished ab'c.   -     X-Ray Chest PA And Lateral; Future; Expected date: 03/03/2020    Asthma w mild severity by pt w interm exacerbation; doing a lot better; not needing rescue albuterol.     Shortness of breath; has resolved; believed from asthma exacerbation  -     Comprehensive metabolic panel; Future; Expected date: 03/03/2020  -     CBC auto differential; Future; Expected date: 03/03/2020  -     TSH; Future; Expected date: 03/03/2020  -     T3, free; Future; Expected date: 03/03/2020  -     T4, free; Future; Expected date: 03/03/2020  -     Magnesium; Future; Expected date: 03/03/2020    ROBBY (obstructive sleep apnea)  -     Ambulatory referral/consult to Pulmonology; Future; Expected date: 03/04/2020 Yovanny Ayers for CPAP mask need.     Hypoxia; has resolved after hospitalization d/ch.    S/P coronary artery stent placement; has been stable. Sees Dr Ward on Friday.     Essential hypertension.Maintain < 2 Gm Na a day diet, and monitor BP at home; keep a log. Decrease indapamide to 1.25 mg a day due to acute renal insufficiency.   -     indapamide (LOZOL) 1.25 MG Tab; Take 1 tablet (1.25 mg total) by mouth once daily.  Dispense: 90 tablet; Refill: 1  -     Comprehensive metabolic panel; Future; Expected date: 03/03/2020  -     CBC auto differential; Future; Expected date: 03/03/2020  -     TSH; Future; Expected date: 03/03/2020  -     T3, free; Future; Expected date: 03/03/2020  -     T4, free; Future; Expected date: 03/03/2020  -     Magnesium; Future; Expected date: 03/03/2020  -     Brain natriuretic peptide; Future; Expected date: 03/03/2020  -     Urinalysis Microscopic; Future; Expected date: 03/03/2020  -     Urinalysis; Future; Expected date: 03/03/2020    Mixed  hyperlipidemia.Maintain low fat high fiber diet, exercise regularly. Weight reduction where indicated. Cont atorvastain.  -     Comprehensive metabolic panel; Future; Expected date: 03/03/2020  -     TSH; Future; Expected date: 03/03/2020  -     T3, free; Future; Expected date: 03/03/2020  -     T4, free; Future; Expected date: 03/03/2020    Dyslipidemia  -     Comprehensive metabolic panel; Future; Expected date: 03/03/2020  -     TSH; Future; Expected date: 03/03/2020  -     T3, free; Future; Expected date: 03/03/2020  -     T4, free; Future; Expected date: 03/03/2020    Type 2 diabetes mellitus without complication, without long-term current use of insulin.Maintain a low carb diet; monitor CBG's at home; keep a log for review. Stop metformin due to GFR 36.5. Watch diet closer; exercise.   -     Comprehensive metabolic panel; Future; Expected date: 03/03/2020  -     Microalbumin/creatinine urine ratio; Future; Expected date: 03/03/2020    Valvular heart disease    Class 1 obesity due to excess calories with serious comorbidity and body mass index (BMI) of 33.0 to 33.9 in adult...Caloric restriction w regular exercise and weight reduction.-     Ambulatory referral/consult to Pulmonology; Future; Expected date: 03/04/2020  -     TSH; Future; Expected date: 03/03/2020  -     T3, free; Future; Expected date: 03/03/2020  -     T4, free; Future; Expected date: 03/03/2020    Acute renal insufficiency; keep well hydrated; decrease indapamide to 1.25 mg a day; make sure he is off hydrochlorothiazide.   -     indapamide (LOZOL) 1.25 MG Tab; Take 1 tablet (1.25 mg total) by mouth once daily.  Dispense: 90 tablet; Refill: 1  -     Comprehensive metabolic panel; Future; Expected date: 03/03/2020  -     CBC auto differential; Future; Expected date: 03/03/2020  -     Brain natriuretic peptide; Future; Expected date: 03/03/2020  -     Urinalysis Microscopic; Future; Expected date: 03/03/2020  -     Urinalysis; Future; Expected  date: 03/03/2020    Hypothyroidism: resume levothyroxine 50 mcg a day; TFT w f/u.

## 2020-03-03 NOTE — PROGRESS NOTES
Subjective:       Patient ID: Jadiel Rossi is a 74 y.o. male.    Chief Complaint: Follow-up (review lab results; S/P hospital stay for pneumonia at [facility])    [facility]   for his labs and follow-up from hospital stay for pneumonia at [facility] rib recently.  Patient recently discharged [facility] 02/19/2020 for pneumonia multilobar; hospitalize reportedly for 7 days.  No documentation available at time of evaluation today.  Records will be obtained patient was discharged home on Augmentin treated with oxygen during his hospital stay serial nebs every 4 hr antibiotics and steroids.  Therapy also included IV fluids and respiratory therapy as well.  Patient is on Spiriva for 30 days has albuterol rescue inhaler if needed.  Patient was admitted with chest pain with shortness of breath and hypoxia.  Since discharge some generalized weakness noted feels good.  Sat 97% on room air he has no coughing or sore throat.  No chest pain shortness of breath or heart palpitations either.      Acute renal insufficiency:  GFR 36.5 previously was greater than 60 06/24/2019.  Off telmisartan; decrease in dap mi to 1.25 mg p.o. daily.  Make sure he is off HCTZ.  Stop metformin altogether he has been off since Christmas.     Hypothyroidism:  TSH 4.8 with free T3 at 2.5; free T4 at 1.17; needs to resume levothyroxine at 50 g per day.     Diabetes mellitus type 2:  Stop metformin altogether due to renal insufficiency; has been off since Christmas reportedly.  Fasting blood sugar 119 with hemoglobin A1c 7.2; creatinine 1.8.  Needs to adhere to low carb diet as well.  Blood sugar at home averages 130s.     Essential hypertension:  Systolic blood pressure here 160/60; knows to watch his salt intake; at home 170/72 to 74.     Obstructive sleep apnea//obesity:  BMI 33.27 knows the need for regular exercise and caloric restriction help his weight come down.  To see Dr. Riddle via pulmonary consult  "for CPAP mask     Mixed hyperlipidemia:  On low-fat high-fiber diet or least tries; on atorvastatin tolerates that fine.     Status post coronary artery stent placement:  No complaints of chest pain shortness of breath or palpitations.  Cardiologist is Dr. Ward.     Total time 11:40 a.m.-12 53 p.m..  Greater than 50% of time spent in discussion, counseling, and review.  Labs reviewed and discussed with patient at length.  Labs ordered for follow-up.  Medications reviewed and addressed.  PMH and surgical hx delineated and noted; SMH/FMH also delineated and noted. ROS obtained at length prior to physical exam being performed.    Review of Systems   Constitutional: Negative for fever and unexpected weight change.   HENT: Negative for congestion, postnasal drip, rhinorrhea and sore throat.    Respiratory: Negative for cough, chest tightness, shortness of breath and wheezing.    Cardiovascular: Negative for chest pain, palpitations and leg swelling.   Gastrointestinal: Negative for abdominal pain, blood in stool, constipation, diarrhea, nausea and vomiting.   Endocrine: Negative for polydipsia, polyphagia and polyuria.   Genitourinary: Negative for dysuria and hematuria.   Musculoskeletal: Negative for arthralgias and myalgias.   Skin: Negative for rash.   Allergic/Immunologic: Negative for environmental allergies and food allergies.   Neurological: Positive for weakness. Negative for syncope.        Slight weakness from hospitalization but better,.   Psychiatric/Behavioral: Negative for dysphoric mood. The patient is not nervous/anxious.        Objective:      Vitals:    03/03/20 1059 03/03/20 1103   BP: (!) 184/62 (!) 160/60   BP Location: Right arm Right arm   Patient Position: Sitting Sitting   BP Method: Medium (Manual) Medium (Manual)   Pulse: 74    Temp: 98.7 °F (37.1 °C)    TempSrc: Oral    SpO2: 97%    Weight: 90.7 kg (199 lb 15.3 oz)    Height: 5' 5" (1.651 m)      Body mass index is 33.27 kg/m².  Wt Readings " from Last 3 Encounters:   03/03/20 90.7 kg (199 lb 15.3 oz)   08/27/19 100.4 kg (221 lb 5.5 oz)   08/20/19 100 kg (220 lb 7.4 oz)        Physical Exam   Constitutional: He is oriented to person, place, and time. He appears well-developed and well-nourished.   HENT:   Head: Normocephalic and atraumatic.   Throat pink; TM's pink; NM swollen; inflamed w left side w nose bleed dried blood. Use simply saline irrigation. Hold on any further steroid nasal sprays for at least 10 days.    Eyes: EOM are normal.   Neck: Normal range of motion. Neck supple. No thyromegaly present.   No carotid bruits heard.    Cardiovascular: Normal rate and regular rhythm. Exam reveals no gallop.   No murmur heard.  MARCO ANTONIO 2/6 aortic; less so 2/6 LLSB softer.    Pulmonary/Chest: Effort normal and breath sounds normal. No respiratory distress. He has no wheezes. He has no rales.   Decreased shraddha BS's but CTA. No wheezing heard.    Abdominal: Soft. Bowel sounds are normal. He exhibits no distension. There is no tenderness. There is no rebound and no guarding.   Musculoskeletal: Normal range of motion. He exhibits no edema.   Lymphadenopathy:     He has no cervical adenopathy.   Neurological: He is alert and oriented to person, place, and time.   Moves all 4 extremities fine.   Skin: No rash noted.   Psychiatric: He has a normal mood and affect. His behavior is normal. Thought content normal.   Vitals reviewed.      Assessment:       1. Hospital discharge follow-up    2. Community acquired pneumonia, unspecified laterality    3. Mild intermittent asthma with exacerbation    4. Shortness of breath    5. Hypoxia    6. ROBBY (obstructive sleep apnea)    7. S/P coronary artery stent placement    8. Essential hypertension    9. Mixed hyperlipidemia    10. Dyslipidemia    11. Type 2 diabetes mellitus without complication, without long-term current use of insulin    12. Valvular heart disease    13. Class 1 obesity due to excess calories with serious  comorbidity and body mass index (BMI) of 33.0 to 33.9 in adult    14. Acute renal insufficiency    15. Hypothyroidism, unspecified type        Plan:       Hospital discharge follow-up; doing a lot better since d/ch; no longer hypoxic and SOB has cleared.   -     X-Ray Chest PA And Lateral; Future; Expected date: 03/03/2020    Community acquired pneumonia, unspecified laterality; finished ab'c.   -     X-Ray Chest PA And Lateral; Future; Expected date: 03/03/2020    Asthma w mild severity by pt w interm exacerbation; doing a lot better; not needing rescue albuterol.     Shortness of breath; has resolved; believed from asthma exacerbation  -     Comprehensive metabolic panel; Future; Expected date: 03/03/2020  -     CBC auto differential; Future; Expected date: 03/03/2020  -     TSH; Future; Expected date: 03/03/2020  -     T3, free; Future; Expected date: 03/03/2020  -     T4, free; Future; Expected date: 03/03/2020  -     Magnesium; Future; Expected date: 03/03/2020    ROBBY (obstructive sleep apnea)  -     Ambulatory referral/consult to Pulmonology; Future; Expected date: 03/04/2020 Yovanny Ayers for CPAP mask need.     Hypoxia; has resolved after hospitalization d/ch.    S/P coronary artery stent placement; has been stable. Sees Dr Ward on Friday.     Essential hypertension.Maintain < 2 Gm Na a day diet, and monitor BP at home; keep a log. Decrease indapamide to 1.25 mg a day due to acute renal insufficiency.   -     indapamide (LOZOL) 1.25 MG Tab; Take 1 tablet (1.25 mg total) by mouth once daily.  Dispense: 90 tablet; Refill: 1  -     Comprehensive metabolic panel; Future; Expected date: 03/03/2020  -     CBC auto differential; Future; Expected date: 03/03/2020  -     TSH; Future; Expected date: 03/03/2020  -     T3, free; Future; Expected date: 03/03/2020  -     T4, free; Future; Expected date: 03/03/2020  -     Magnesium; Future; Expected date: 03/03/2020  -     Brain natriuretic peptide; Future; Expected date:  03/03/2020  -     Urinalysis Microscopic; Future; Expected date: 03/03/2020  -     Urinalysis; Future; Expected date: 03/03/2020    Mixed hyperlipidemia.Maintain low fat high fiber diet, exercise regularly. Weight reduction where indicated. Cont atorvastain.  -     Comprehensive metabolic panel; Future; Expected date: 03/03/2020  -     TSH; Future; Expected date: 03/03/2020  -     T3, free; Future; Expected date: 03/03/2020  -     T4, free; Future; Expected date: 03/03/2020    Dyslipidemia  -     Comprehensive metabolic panel; Future; Expected date: 03/03/2020  -     TSH; Future; Expected date: 03/03/2020  -     T3, free; Future; Expected date: 03/03/2020  -     T4, free; Future; Expected date: 03/03/2020    Type 2 diabetes mellitus without complication, without long-term current use of insulin.Maintain a low carb diet; monitor CBG's at home; keep a log for review. Stop metformin due to GFR 36.5. Watch diet closer; exercise.   -     Comprehensive metabolic panel; Future; Expected date: 03/03/2020  -     Microalbumin/creatinine urine ratio; Future; Expected date: 03/03/2020    Valvular heart disease; has been stable.    Class 1 obesity due to excess calories with serious comorbidity and body mass index (BMI) of 33.0 to 33.9 in adult...Caloric restriction w regular exercise and weight reduction.-     Ambulatory referral/consult to Pulmonology; Future; Expected date: 03/04/2020  -     TSH; Future; Expected date: 03/03/2020  -     T3, free; Future; Expected date: 03/03/2020  -     T4, free; Future; Expected date: 03/03/2020    Acute renal insufficiency; keep well hydrated; decrease indapamide to 1.25 mg a day; make sure he is off hydrochlorothiazide.   -     indapamide (LOZOL) 1.25 MG Tab; Take 1 tablet (1.25 mg total) by mouth once daily.  Dispense: 90 tablet; Refill: 1  -     Comprehensive metabolic panel; Future; Expected date: 03/03/2020  -     CBC auto differential; Future; Expected date: 03/03/2020  -     Brain  natriuretic peptide; Future; Expected date: 03/03/2020  -     Urinalysis Microscopic; Future; Expected date: 03/03/2020  -     Urinalysis; Future; Expected date: 03/03/2020    Hypothyroidism: resume levothyroxine 50 mcg a day; TFT w f/u.

## 2020-03-04 ENCOUNTER — TELEPHONE (OUTPATIENT)
Dept: HOME HEALTH SERVICES | Facility: HOSPITAL | Age: 75
End: 2020-03-04

## 2020-03-04 DIAGNOSIS — E11.9 TYPE 2 DIABETES MELLITUS WITHOUT COMPLICATION, WITHOUT LONG-TERM CURRENT USE OF INSULIN: Primary | ICD-10-CM

## 2020-03-05 ENCOUNTER — HOSPITAL ENCOUNTER (OUTPATIENT)
Dept: RADIOLOGY | Facility: HOSPITAL | Age: 75
Discharge: HOME | End: 2020-03-05
Attending: INTERNAL MEDICINE
Payer: MEDICARE

## 2020-03-05 ENCOUNTER — IMMUNIZATION (OUTPATIENT)
Dept: FAMILY MEDICINE | Facility: CLINIC | Age: 75
End: 2020-03-05
Payer: MEDICARE

## 2020-03-05 ENCOUNTER — EXTERNAL HOME HEALTH (OUTPATIENT)
Dept: HOME HEALTH SERVICES | Facility: HOSPITAL | Age: 75
End: 2020-03-05
Payer: MEDICARE

## 2020-03-05 DIAGNOSIS — Z09 HOSPITAL DISCHARGE FOLLOW-UP: ICD-10-CM

## 2020-03-05 DIAGNOSIS — J18.9 COMMUNITY ACQUIRED PNEUMONIA, UNSPECIFIED LATERALITY: ICD-10-CM

## 2020-03-05 PROCEDURE — G0008 FLU VACCINE - HIGH DOSE (65+) PRESERVATIVE FREE IM: ICD-10-PCS | Mod: HCNC,S$GLB,, | Performed by: INTERNAL MEDICINE

## 2020-03-05 PROCEDURE — 71046 X-RAY EXAM CHEST 2 VIEWS: CPT | Mod: 26,HCNC,, | Performed by: RADIOLOGY

## 2020-03-05 PROCEDURE — 71046 X-RAY EXAM CHEST 2 VIEWS: CPT | Mod: TC,HCNC,PN

## 2020-03-05 PROCEDURE — G0008 ADMIN INFLUENZA VIRUS VAC: HCPCS | Mod: HCNC,S$GLB,, | Performed by: INTERNAL MEDICINE

## 2020-03-05 PROCEDURE — 71046 XR CHEST PA AND LATERAL: ICD-10-PCS | Mod: 26,HCNC,, | Performed by: RADIOLOGY

## 2020-03-05 PROCEDURE — 90662 FLU VACCINE - HIGH DOSE (65+) PRESERVATIVE FREE IM: ICD-10-PCS | Mod: HCNC,S$GLB,, | Performed by: INTERNAL MEDICINE

## 2020-03-05 PROCEDURE — 90662 IIV NO PRSV INCREASED AG IM: CPT | Mod: HCNC,S$GLB,, | Performed by: INTERNAL MEDICINE

## 2020-03-05 NOTE — TELEPHONE ENCOUNTER
Patient would like a RX refill for Contour test Strips.  Last ov - 03/03/2020  Future ov - 05/25/2020  Last refill - N/A      Please review and advise. Thank you.

## 2020-03-06 ENCOUNTER — TELEPHONE (OUTPATIENT)
Dept: FAMILY MEDICINE | Facility: CLINIC | Age: 75
End: 2020-03-06

## 2020-03-06 ENCOUNTER — PATIENT MESSAGE (OUTPATIENT)
Dept: FAMILY MEDICINE | Facility: CLINIC | Age: 75
End: 2020-03-06

## 2020-03-06 DIAGNOSIS — N17.9 AKI (ACUTE KIDNEY INJURY): ICD-10-CM

## 2020-03-06 DIAGNOSIS — E11.9 TYPE 2 DIABETES MELLITUS WITHOUT COMPLICATION, WITHOUT LONG-TERM CURRENT USE OF INSULIN: Primary | ICD-10-CM

## 2020-03-06 NOTE — TELEPHONE ENCOUNTER
Please notify patient that contour test strips was sent to his pharmacy in [facility]

## 2020-03-06 NOTE — TELEPHONE ENCOUNTER
Notify patient that due to several abnormalities in his blood chemistry I would like him to repeat his blood chemistry non- fasting sometime today if at all possible.  Will go over the results with him shortly once we receive them

## 2020-03-09 ENCOUNTER — EXTERNAL HOME HEALTH (OUTPATIENT)
Dept: HOME HEALTH SERVICES | Facility: HOSPITAL | Age: 75
End: 2020-03-09
Payer: MEDICARE

## 2020-03-09 ENCOUNTER — LAB VISIT (OUTPATIENT)
Dept: LAB | Facility: HOSPITAL | Age: 75
End: 2020-03-09
Attending: INTERNAL MEDICINE
Payer: MEDICARE

## 2020-03-09 DIAGNOSIS — E11.9 TYPE 2 DIABETES MELLITUS WITHOUT COMPLICATION, WITHOUT LONG-TERM CURRENT USE OF INSULIN: ICD-10-CM

## 2020-03-09 DIAGNOSIS — N17.9 AKI (ACUTE KIDNEY INJURY): ICD-10-CM

## 2020-03-09 LAB
ALBUMIN SERPL BCP-MCNC: 3.1 G/DL (ref 3.5–5.2)
ALP SERPL-CCNC: 78 U/L (ref 55–135)
ALT SERPL W/O P-5'-P-CCNC: 27 U/L (ref 10–44)
ANION GAP SERPL CALC-SCNC: 11 MMOL/L (ref 8–16)
AST SERPL-CCNC: 18 U/L (ref 10–40)
BILIRUB SERPL-MCNC: 0.4 MG/DL (ref 0.1–1)
BUN SERPL-MCNC: 25 MG/DL (ref 8–23)
CALCIUM SERPL-MCNC: 9.8 MG/DL (ref 8.7–10.5)
CHLORIDE SERPL-SCNC: 98 MMOL/L (ref 95–110)
CO2 SERPL-SCNC: 31 MMOL/L (ref 23–29)
CREAT SERPL-MCNC: 1.6 MG/DL (ref 0.5–1.4)
EST. GFR  (AFRICAN AMERICAN): 48.7 ML/MIN/1.73 M^2
EST. GFR  (NON AFRICAN AMERICAN): 42.1 ML/MIN/1.73 M^2
GLUCOSE SERPL-MCNC: 173 MG/DL (ref 70–110)
POTASSIUM SERPL-SCNC: 4.7 MMOL/L (ref 3.5–5.1)
PROT SERPL-MCNC: 7.4 G/DL (ref 6–8.4)
SODIUM SERPL-SCNC: 140 MMOL/L (ref 136–145)

## 2020-03-09 PROCEDURE — 80053 COMPREHEN METABOLIC PANEL: CPT | Mod: HCNC

## 2020-03-09 PROCEDURE — 36415 COLL VENOUS BLD VENIPUNCTURE: CPT | Mod: HCNC,PN

## 2020-03-12 ENCOUNTER — TELEPHONE (OUTPATIENT)
Dept: FAMILY MEDICINE | Facility: CLINIC | Age: 75
End: 2020-03-12

## 2020-03-12 DIAGNOSIS — E11.9 TYPE 2 DIABETES MELLITUS WITHOUT COMPLICATION, WITHOUT LONG-TERM CURRENT USE OF INSULIN: Primary | ICD-10-CM

## 2020-03-12 RX ORDER — GLIMEPIRIDE 1 MG/1
1 TABLET ORAL
Qty: 90 TABLET | Refills: 1 | Status: ON HOLD | OUTPATIENT
Start: 2020-03-12 | End: 2020-04-26

## 2020-03-12 NOTE — TELEPHONE ENCOUNTER
Received fax from [employer]([phone]), Contour Test Strips 100's not covered by plan, insurance wants Accu-chek.    Last ov -- 03/03/2020  Future ov - 05/25/2020  Last refill - N/A        Please review and advise. Thank you.

## 2020-03-12 NOTE — TELEPHONE ENCOUNTER
Please notify patient that easy Touch test stripswere discontinued and Accu-Chek test strips were sent in to his pharmacy

## 2020-03-13 ENCOUNTER — PATIENT MESSAGE (OUTPATIENT)
Dept: FAMILY MEDICINE | Facility: CLINIC | Age: 75
End: 2020-03-13

## 2020-03-14 ENCOUNTER — TELEPHONE (OUTPATIENT)
Dept: FAMILY MEDICINE | Facility: CLINIC | Age: 75
End: 2020-03-14

## 2020-03-14 DIAGNOSIS — E11.9 TYPE 2 DIABETES MELLITUS WITHOUT COMPLICATION, WITHOUT LONG-TERM CURRENT USE OF INSULIN: Primary | ICD-10-CM

## 2020-03-14 DIAGNOSIS — N17.9 AKI (ACUTE KIDNEY INJURY): ICD-10-CM

## 2020-03-14 NOTE — TELEPHONE ENCOUNTER
Notify patient that I have placed a BMP in for additional renal function test to be obtained in 2 weeks if he would pass by the lab after an overnight fast to have it drawn would let him know the results for reassessment; ask if he can also please adhere to low carb diet and exercise regularly and try to get his weight down in order to improve his fasting blood sugar.

## 2020-03-16 NOTE — TELEPHONE ENCOUNTER
Spoke with pt and informed him that Dr. Chandler put in order for BMP to have drawn in 2 weeks. Also informed pt that PCP recommended a low carb diet and exercise to get his weight down and improve his fasting blood glucose. Pt verbalized understanding.

## 2020-04-01 ENCOUNTER — TELEPHONE (OUTPATIENT)
Dept: FAMILY MEDICINE | Facility: CLINIC | Age: 75
End: 2020-04-01

## 2020-04-01 ENCOUNTER — PATIENT MESSAGE (OUTPATIENT)
Dept: FAMILY MEDICINE | Facility: CLINIC | Age: 75
End: 2020-04-01

## 2020-04-01 DIAGNOSIS — E11.9 TYPE 2 DIABETES MELLITUS WITHOUT COMPLICATION, WITHOUT LONG-TERM CURRENT USE OF INSULIN: Primary | ICD-10-CM

## 2020-04-01 NOTE — TELEPHONE ENCOUNTER
Please notify patient that diabetic test strips were sent into his pharmacist insurance preferred as earlier submitted Test strips were not approved by his insurance company

## 2020-04-01 NOTE — TELEPHONE ENCOUNTER
----- [facility] sent at 4/1/2020  4:35 PM CDT -----  Contact: pt  Type:  Patient Returning Call    Who Called:  pt  Who Left Message for Patient:    Does the patient know what this is regarding?:  yes  Best Call Back Number:   Additional Information:  Pt called back

## 2020-04-01 NOTE — TELEPHONE ENCOUNTER
Received fax from [facility]([phone]) requesting change in medication or PA for One Touch Ultra Blue Test Strips.         Please review and advise. Thank you

## 2020-04-26 PROBLEM — J96.01 ACUTE HYPOXEMIC RESPIRATORY FAILURE: Status: ACTIVE | Noted: 2020-04-26

## 2020-04-26 PROBLEM — R06.02 SOB (SHORTNESS OF BREATH): Status: ACTIVE | Noted: 2020-04-26

## 2020-04-27 PROBLEM — I48.91 ATRIAL FIBRILLATION WITH RVR: Status: ACTIVE | Noted: 2020-04-27

## 2020-04-28 PROBLEM — D50.9 IRON DEFICIENCY ANEMIA: Status: ACTIVE | Noted: 2020-04-28

## 2020-04-28 PROBLEM — R59.0 MEDIASTINAL ADENOPATHY: Status: ACTIVE | Noted: 2020-04-28

## 2020-04-28 PROBLEM — N18.2 STAGE 2 CHRONIC KIDNEY DISEASE: Status: ACTIVE | Noted: 2020-04-28

## 2020-04-29 ENCOUNTER — TELEPHONE (OUTPATIENT)
Dept: FAMILY MEDICINE | Facility: CLINIC | Age: 75
End: 2020-04-29

## 2020-04-29 RX ORDER — ALBUTEROL SULFATE 90 UG/1
AEROSOL, METERED RESPIRATORY (INHALATION)
Qty: 18 G | Refills: 2 | Status: SHIPPED | OUTPATIENT
Start: 2020-04-29 | End: 2020-07-28

## 2020-04-29 NOTE — TELEPHONE ENCOUNTER
Rec'd call from Flora at [facility]. Pt has f/u appt , virtual visit on 5/14/20 and they would like pt to have sooner appt. Called pt to offer sooner appt. No answer. Left msg for pt to call back.

## 2020-05-01 DIAGNOSIS — E11.9 DIABETES MELLITUS, TYPE 2: ICD-10-CM

## 2020-05-05 ENCOUNTER — OFFICE VISIT (OUTPATIENT)
Dept: FAMILY MEDICINE | Facility: CLINIC | Age: 75
End: 2020-05-05
Payer: MEDICARE

## 2020-05-05 DIAGNOSIS — J81.0 ACUTE PULMONARY EDEMA: ICD-10-CM

## 2020-05-05 DIAGNOSIS — Z95.5 S/P CORONARY ARTERY STENT PLACEMENT: ICD-10-CM

## 2020-05-05 DIAGNOSIS — E03.9 HYPOTHYROIDISM, UNSPECIFIED TYPE: ICD-10-CM

## 2020-05-05 DIAGNOSIS — D12.6 TUBULAR ADENOMA OF COLON: ICD-10-CM

## 2020-05-05 DIAGNOSIS — J18.9 PNEUMONIA OF BOTH LUNGS DUE TO INFECTIOUS ORGANISM, UNSPECIFIED PART OF LUNG: ICD-10-CM

## 2020-05-05 DIAGNOSIS — R93.89 ABNORMAL CT OF THE CHEST: ICD-10-CM

## 2020-05-05 DIAGNOSIS — D63.8 ANEMIA, CHRONIC DISEASE: ICD-10-CM

## 2020-05-05 DIAGNOSIS — E78.2 MIXED HYPERLIPIDEMIA: ICD-10-CM

## 2020-05-05 DIAGNOSIS — I10 ESSENTIAL HYPERTENSION: ICD-10-CM

## 2020-05-05 DIAGNOSIS — R59.0 MEDIASTINAL ADENOPATHY: ICD-10-CM

## 2020-05-05 DIAGNOSIS — Z09 HOSPITAL DISCHARGE FOLLOW-UP: Primary | ICD-10-CM

## 2020-05-05 DIAGNOSIS — R60.0 BILATERAL LOWER EXTREMITY EDEMA: ICD-10-CM

## 2020-05-05 DIAGNOSIS — J96.01 ACUTE HYPOXEMIC RESPIRATORY FAILURE: ICD-10-CM

## 2020-05-05 DIAGNOSIS — I48.91 ATRIAL FIBRILLATION WITH RVR: ICD-10-CM

## 2020-05-05 DIAGNOSIS — E11.69 TYPE 2 DIABETES MELLITUS WITH OTHER SPECIFIED COMPLICATION, WITHOUT LONG-TERM CURRENT USE OF INSULIN: ICD-10-CM

## 2020-05-05 PROCEDURE — 3051F PR MOST RECENT HEMOGLOBIN A1C LEVEL 7.0 - < 8.0%: ICD-10-PCS | Mod: HCNC,CPTII,95, | Performed by: INTERNAL MEDICINE

## 2020-05-05 PROCEDURE — 99499 UNLISTED E&M SERVICE: CPT | Mod: HCNC,95,, | Performed by: INTERNAL MEDICINE

## 2020-05-05 PROCEDURE — 3051F HG A1C>EQUAL 7.0%<8.0%: CPT | Mod: HCNC,CPTII,95, | Performed by: INTERNAL MEDICINE

## 2020-05-05 PROCEDURE — 99499 RISK ADDL DX/OHS AUDIT: ICD-10-PCS | Mod: HCNC,95,, | Performed by: INTERNAL MEDICINE

## 2020-05-05 PROCEDURE — 1101F PT FALLS ASSESS-DOCD LE1/YR: CPT | Mod: HCNC,CPTII,95, | Performed by: INTERNAL MEDICINE

## 2020-05-05 PROCEDURE — 1101F PR PT FALLS ASSESS DOC 0-1 FALLS W/OUT INJ PAST YR: ICD-10-PCS | Mod: HCNC,CPTII,95, | Performed by: INTERNAL MEDICINE

## 2020-05-05 PROCEDURE — 99214 OFFICE O/P EST MOD 30 MIN: CPT | Mod: HCNC,95,, | Performed by: INTERNAL MEDICINE

## 2020-05-05 PROCEDURE — 1159F MED LIST DOCD IN RCRD: CPT | Mod: HCNC,95,, | Performed by: INTERNAL MEDICINE

## 2020-05-05 PROCEDURE — 99214 PR OFFICE/OUTPT VISIT, EST, LEVL IV, 30-39 MIN: ICD-10-PCS | Mod: HCNC,95,, | Performed by: INTERNAL MEDICINE

## 2020-05-05 PROCEDURE — 1159F PR MEDICATION LIST DOCUMENTED IN MEDICAL RECORD: ICD-10-PCS | Mod: HCNC,95,, | Performed by: INTERNAL MEDICINE

## 2020-05-05 NOTE — PROGRESS NOTES
Subjective:      The patient location is:[place]; [place]  The chief complaint leading to consultation is: hospital f/u.  04/26/2020 admit to [facility] for acute hypoxic respiratory failure  Visit type: audiovisual  Total time spent with patient:  149-232 p.m. than 50% of the time spent in discussion, counseling, and review.  Labs reviewed with patient in ordered for follow-up.  Additional 20 min spent on supplemental documentation and further review.  Each patient to whom he or she provides medical services by telemedicine is:  (1) informed of the relationship between the physician and patient and the respective role of any other health care provider with respect to management of the patient; and (2) notified that he or she may decline to receive medical services by telemedicine and may withdraw from such care at any time.    Notes:  Please see [facility]       Patient ID: Jadiel Rossi is a 74 y.o. male.    Chief Complaint:  Follow-up from recent hospital stay at [facility]    HPI  Patient was reportedly admitted [facility] 04/26/2020 and stayed for 4 days and was discharged 04/29/2020.  COVID screen was negative; O2 was needed at 2 L.  Patient was noted to have left greater than right lower extremity edema as well for reportedly bilateral pneumonia acute hypoxia with respiratory failure; CTA of the chest was negative for PE; please see rest of the findings on CTA of the chest.  patient was noted to have an elevated D-dimer also with atrial fib with rapid ventricular response with dyspnea managed as well treatment included ceftriaxone and azithromycin for antibiotic coverage patient was to see cardiologist Dr. Ward in 5 days after discharge.  Patient is on Eliquis as an anticoagulant.  Ultrasound lower extremities negative for DVT.  Pulmonary consult is pending in follow-up by Dr. Ward his cardiologist.     Of note patient hospitalized 02/19/2020 at [place]  for a multilobar pneumonia.  Patient also recently required 2 units of blood at [facility] the end of February for anemia.     Patient with no chest pain shortness of breath or palpitation.  No audible wheezing heard.  Lower extremity edema reportedly doing a lot better.     Hypertension:  Blood pressures been averaging 145/60 8-70 with pulse 70-80.  He knows to watch his salt a lot better and doing carburetor component of exercise as tolerated into his weekly routine.     Mixed hyperlipidemia:  On low-fat high-fiber diet or least tries; tolerates atorvastatin fine.     Coronary stent x2:  Sees as his cardiologist Dr. Ward. This is apparently been stable.     Atrial fib with rapid ventricular response:  Has been stable apparently since discharge; patient is on Eliquis sotalol and Plavix per Cardiology.     Diabetes mellitus type 2:  Blood sugars been averaging 106-116.  Patient has been off metformin since the hospital; an on glimepiride; 4/29/20 GFR had improved to 58.  Will resume metformin at 500 mg per day initially then b.i.d. if needed.  Stop his glimepiride.     Hypothyroidism:  Takes his levothyroxine appropriately on 50 g p.o. Daily.     Normocytic anemia:  Normocytic anemia which has improved from 8.8/27.9 to 9.0/28.6.  Patient is on Feosol 325 mg per day and multivitamin patient with a low TIBC pointing towards anemia chronic disease.    Total:  SP performed 05/01/2019 by Dr. Douglas.  Five sessile polyps removed from the transverse colon and 1 sessile polyp from the hepatic flexure removed.  Transverse colon polyp in hepatic flexure polyp removed sent to pathology both returned back tubular adenoma; repeat total colonoscopy for surveillance in 4 years by Dr. Solomon.            Review of Systems   Constitutional: Negative for fever and unexpected weight change.   HENT: Negative for congestion, postnasal drip and rhinorrhea.    Respiratory: Negative for cough, chest tightness,  shortness of breath and wheezing.    Cardiovascular: Negative for chest pain, palpitations and leg swelling.   Gastrointestinal: Negative for abdominal pain, blood in stool, constipation, diarrhea, nausea and vomiting.   Genitourinary: Negative for dysuria and hematuria.   Skin: Negative for rash.   Neurological: Negative for syncope and weakness.   Psychiatric/Behavioral: Negative for dysphoric mood. The patient is not nervous/anxious.        Objective:      Vitals:    05/16/20 1832   BP: (!) 148/67  Comment: Vital sign on 05/05/2020   Pulse: 70   Resp: 12   Weight: 93.9 kg (207 lb)  Comment: On 05/04/2020     Body mass index is 29.7 kg/m².  Wt Readings from Last 3 Encounters:   05/16/20 93.9 kg (207 lb)   04/29/20 97 kg (213 lb 13.5 oz)   03/03/20 90.7 kg (199 lb 15.3 oz)        Physical Exam   Constitutional: He is oriented to person, place, and time. He appears well-developed and well-nourished. No distress.   No apparent distress alert and oriented answers questions appropriately with good thought content   HENT:   Head: Normocephalic and atraumatic.   Eyes: EOM are normal.   Neck: Normal range of motion.   Pulmonary/Chest: Effort normal. No respiratory distress.   Breathing comfortably no signs of any respiratory distress with his speech or otherwise.   Musculoskeletal: Normal range of motion.   Neurological: He is alert and oriented to person, place, and time.   Skin: He is not diaphoretic.   Psychiatric: He has a normal mood and affect. His behavior is normal. Judgment and thought content normal.   Vitals reviewed.      Assessment:       1. Hospital discharge follow-up    2. Acute hypoxemic respiratory failure    3. Abnormal CT of the chest    4. Pneumonia of both lungs due to infectious organism, unspecified part of lung    5. Acute pulmonary edema    6. Mediastinal adenopathy    7. Atrial fibrillation with RVR    8. S/P coronary artery stent placement    9. Essential hypertension    10. Type 2 diabetes  mellitus with other specified complication, without long-term current use of insulin    11. Mixed hyperlipidemia    12. Hypothyroidism, unspecified type    13. Bilateral lower extremity edema    14. Anemia, chronic disease    15. Tubular adenoma of colon        Plan:     Hospital discharge follow-up:  Mid hospital with suspected bilateral pneumonia and acute respiratory failure with hypoxia; CT of the chest was negative for PE.  Patient also with atrial fib with rapid ventricular response.  Thought to have bilateral atelectasis with bilateral pulmonary edema.  Doing better in follow-up with no complaints of chest pain shortness of breath or palpitations; lower extremity edema is a lot better; no wheezing is noted.     Acute hypoxemic respiratory failure:  Seems to have improved a fair amount since his hospitalization and discharge; continue present management  -     CBC auto differential; Future; Expected date: 05/05/2020  -     T4, free; Future; Expected date: 05/05/2020  -     T3, free; Future; Expected date: 05/05/2020  -     TSH; Future; Expected date: 05/05/2020  -     Comprehensive metabolic panel; Future; Expected date: 05/05/2020  -     Magnesium; Future; Expected date: 05/05/2020  -     Brain Natriuretic Peptide; Future; Expected date: 05/05/2020  -     X-Ray Chest PA And Lateral; Future; Expected date: 05/16/2020    Abnormal CT of the chest:  Pulmonary consult placed for Dr. Ward cardiology; recommendations by the radiologist on the CT a was to obtain CT of the chest in 3 months to ensure clearance of mediastinal lymph nodes.  Will leave to the Pulmonary service to order.  -     X-Ray Chest PA And Lateral; Future; Expected date: 05/16/2020    Pneumonia of both lungs due to infectious organism, unspecified part of lung  -     CBC auto differential; Future; Expected date: 05/05/2020  -     X-Ray Chest PA And Lateral; Future; Expected date: 05/16/2020    Acute pulmonary edema:  Seems to have serial  improvement with both hospitalization and since his discharge with regards to his breathing.  -     CBC auto differential; Future; Expected date: 05/05/2020  -     T4, free; Future; Expected date: 05/05/2020  -     T3, free; Future; Expected date: 05/05/2020  -     TSH; Future; Expected date: 05/05/2020  -     Comprehensive metabolic panel; Future; Expected date: 05/05/2020  -     Magnesium; Future; Expected date: 05/05/2020  -     Brain Natriuretic Peptide; Future; Expected date: 05/05/2020  -     X-Ray Chest PA And Lateral; Future; Expected date: 05/16/2020    Mediastinal adenopathy:  Pulmonologist has been consulted per cardiologist Dr. Ward    Atrial fibrillation with RVR:  Seems to have stabilized on Eliquis sotalol and Plavix.  -     CBC auto differential; Future; Expected date: 05/05/2020  -     T4, free; Future; Expected date: 05/05/2020  -     T3, free; Future; Expected date: 05/05/2020  -     TSH; Future; Expected date: 05/05/2020  -     Comprehensive metabolic panel; Future; Expected date: 05/05/2020  -     Magnesium; Future; Expected date: 05/05/2020  -     Brain Natriuretic Peptide; Future; Expected date: 05/05/2020    S/P coronary artery stent placement:  Followed by Cardiology Dr. Ward; appears to be stable at present  -     CBC auto differential; Future; Expected date: 05/05/2020  -     Comprehensive metabolic panel; Future; Expected date: 05/05/2020  -     Brain Natriuretic Peptide; Future; Expected date: 05/05/2020    Essential hypertension: Maintain < 2 Gm Na a day diet, and monitor BP at home; keep a log.  Continue present management.  -     CBC auto differential; Future; Expected date: 05/05/2020  -     T4, free; Future; Expected date: 05/05/2020  -     T3, free; Future; Expected date: 05/05/2020  -     TSH; Future; Expected date: 05/05/2020  -     Comprehensive metabolic panel; Future; Expected date: 05/05/2020  -     Magnesium; Future; Expected date: 05/05/2020  -     Lipid Panel; Future;  Expected date: 05/05/2020  -     Hemoglobin A1C; Future; Expected date: 05/05/2020    Type 2 diabetes mellitus with other specified complication, without long-term current use of insulin:  Recommend discontinue glimepiride resume metformin at 500 mg a day then after a few days increase to b.i.d. if needed  -     Comprehensive metabolic panel; Future; Expected date: 05/05/2020  -     Hemoglobin A1C; Future; Expected date: 05/05/2020    Mixed hyperlipidemia: Maintain low fat high fiber diet, exercise regularly. Weight reduction where indicated.  Continue atorvastatin at present dose  -     T4, free; Future; Expected date: 05/05/2020  -     T3, free; Future; Expected date: 05/05/2020  -     TSH; Future; Expected date: 05/05/2020  -     Comprehensive metabolic panel; Future; Expected date: 05/05/2020  -     Lipid Panel; Future; Expected date: 05/05/2020    Hypothyroidism, unspecified type:  Thyroid function test on follow-up for reassessment  -     T4, free; Future; Expected date: 05/05/2020  -     T3, free; Future; Expected date: 05/05/2020  -     TSH; Future; Expected date: 05/05/2020    Bilateral lower extremity edema:  Lower extremity edema reportedly is doing a lot better.  Continue present management  -     T4, free; Future; Expected date: 05/05/2020  -     T3, free; Future; Expected date: 05/05/2020  -     TSH; Future; Expected date: 05/05/2020  -     Comprehensive metabolic panel; Future; Expected date: 05/05/2020  -     Magnesium; Future; Expected date: 05/05/2020  -     Brain Natriuretic Peptide; Future; Expected date: 05/05/2020    Anemia, chronic disease  -     CBC auto differential; Future; Expected date: 05/05/2020  -     Iron and TIBC; Future; Expected date: 05/05/2020  -     Ferritin; Future; Expected date: 05/05/2020    Tubular adenoma of colon:  05/01/2019 total colonoscopy with 5 sessile polyps removed from transverse colon and 1 sessile polyp removed from hepatic flexure.  Transverse colon polyp and  hepatic flexure polyp both positive for tubular adenoma.  Total colonoscopy recommended in 4 years or around 05/01/2023 by Dr. Solomon; high-fiber diet

## 2020-05-06 ENCOUNTER — PATIENT MESSAGE (OUTPATIENT)
Dept: ADMINISTRATIVE | Facility: HOSPITAL | Age: 75
End: 2020-05-06

## 2020-05-16 ENCOUNTER — TELEPHONE (OUTPATIENT)
Dept: FAMILY MEDICINE | Facility: CLINIC | Age: 75
End: 2020-05-16

## 2020-05-16 VITALS
WEIGHT: 207 LBS | DIASTOLIC BLOOD PRESSURE: 67 MMHG | SYSTOLIC BLOOD PRESSURE: 148 MMHG | BODY MASS INDEX: 29.7 KG/M2 | RESPIRATION RATE: 12 BRPM | HEART RATE: 70 BPM

## 2020-05-16 NOTE — PATIENT INSTRUCTIONS
Hospital discharge follow-up:  Mid hospital with suspected bilateral pneumonia and acute respiratory failure with hypoxia; CT of the chest was negative for PE.  Patient also with atrial fib with rapid ventricular response.  Thought to have bilateral atelectasis with bilateral pulmonary edema.  Doing better in follow-up with no complaints of chest pain shortness of breath or palpitations; lower extremity edema is a lot better; no wheezing is noted.     Acute hypoxemic respiratory failure:  Seems to have improved a fair amount since his hospitalization and discharge; continue present management    Abnormal CT of the chest:  Pulmonary consult placed for Dr. Ward cardiology; recommendations by the radiologist on the CT a was to obtain CT of the chest in 3 months to ensure clearance of mediastinal lymph nodes.  Will leave to the Pulmonary service to order.    Pneumonia of both lungs due to infectious organism, unspecified part of lung; seems to be doing a lot better    Acute pulmonary edema:  Seems to have serial improvement with both hospitalization and since his discharge with regards to his breathing.  Continue present management    Mediastinal adenopathy:  Pulmonologist has been consulted per cardiologist Dr. Ward    Atrial fibrillation with RVR:  Seems to have stabilized on Eliquis, sotalol and Plavix.    S/P coronary artery stent placement:  Followed by Cardiology Dr. Ward; appears to be stable at present    Essential hypertension: Maintain < 2 Gm Na a day diet, and monitor BP at home; keep a log.  Continue present management.    Type 2 diabetes mellitus with other specified complication, without long-term current use of insulin:  Recommend discontinue glimepiride resume metformin at 500 mg a day then after a few days increase to b.i.d. if needed    Mixed hyperlipidemia: Maintain low fat high fiber diet, exercise regularly. Weight reduction where indicated.  Continue atorvastatin at present  dose    Hypothyroidism, unspecified type:  Thyroid function test on follow-up for reassessment    Bilateral lower extremity edema:  Lower extremity edema reportedly is doing a lot better.  Continue present management    Anemia, chronic disease    Tubular adenoma of colon:  05/01/2019 total colonoscopy with 5 sessile polyps removed from transverse colon and 1 sessile polyp removed from hepatic flexure.  Transverse colon polyp and hepatic flexure polyp both positive for tubular adenoma.  Total colonoscopy recommended in 4 years or around 05/01/2023 by Dr. Solomon; high-fiber diet

## 2020-05-16 NOTE — TELEPHONE ENCOUNTER
Please help patient schedule his chest x-ray EPA and lateral for follow-up, to be repeated before his follow-up appointment; sometime within the next week.  Patient to obtain his labs after an overnight fast prior to his follow-up appointment as well

## 2020-05-18 ENCOUNTER — TELEPHONE (OUTPATIENT)
Dept: FAMILY MEDICINE | Facility: CLINIC | Age: 75
End: 2020-05-18

## 2020-05-18 NOTE — TELEPHONE ENCOUNTER
----- Message from Karon Munson sent at 5/18/2020  1:04 PM CDT -----  Contact: patient  Type:  Sooner Apoointment Request    Caller is requesting a sooner appointment.  Caller declined first available appointment listed below.  Caller will not accept being placed on the waitlist and is requesting a message be sent to doctor.    Name of Caller:  Patient  When is the first available appointment?  7/16  Symptoms:  Follow up/lab/xray results  Best Call Back Number:    Additional Information:  Please advise-thank you

## 2020-05-18 NOTE — TELEPHONE ENCOUNTER
----- Message from Manny Maria sent at 5/18/2020  1:39 PM CDT -----  Type:  Patient Returning Call    Who Called:  Patient  Who Left Message for Patient:  Adriane  Does the patient know what this is regarding?:  Appointment  Best Call Back Number:  [phone]  Additional Information:

## 2020-05-25 ENCOUNTER — HOSPITAL ENCOUNTER (OUTPATIENT)
Dept: RADIOLOGY | Facility: HOSPITAL | Age: 75
Discharge: HOME | End: 2020-05-25
Attending: INTERNAL MEDICINE
Payer: MEDICARE

## 2020-05-25 DIAGNOSIS — J18.9 PNEUMONIA OF BOTH LUNGS DUE TO INFECTIOUS ORGANISM, UNSPECIFIED PART OF LUNG: ICD-10-CM

## 2020-05-25 DIAGNOSIS — J96.01 ACUTE HYPOXEMIC RESPIRATORY FAILURE: ICD-10-CM

## 2020-05-25 DIAGNOSIS — J81.0 ACUTE PULMONARY EDEMA: ICD-10-CM

## 2020-05-25 DIAGNOSIS — R93.89 ABNORMAL CT OF THE CHEST: ICD-10-CM

## 2020-05-25 PROCEDURE — 71046 X-RAY EXAM CHEST 2 VIEWS: CPT | Mod: 26,HCNC,, | Performed by: RADIOLOGY

## 2020-05-25 PROCEDURE — 71046 XR CHEST PA AND LATERAL: ICD-10-PCS | Mod: 26,HCNC,, | Performed by: RADIOLOGY

## 2020-05-25 PROCEDURE — 71046 X-RAY EXAM CHEST 2 VIEWS: CPT | Mod: TC,HCNC,PN

## 2020-05-26 NOTE — TELEPHONE ENCOUNTER
----- Message from Angie Caldwell sent at 5/26/2020  4:44 PM CDT -----  Contact: Pt  Type: Needs medical advice      Who Called:  Pt  Best Call Back Number:   [phone]  Additional Information:   Pt requesting a call back in regards to a medication amLODIPine (NORVASC) 5 MG tablet

## 2020-05-27 RX ORDER — AMLODIPINE BESYLATE 5 MG/1
5 TABLET ORAL 2 TIMES DAILY
Qty: 60 TABLET | Refills: 1 | Status: SHIPPED | OUTPATIENT
Start: 2020-05-27 | End: 2020-05-27

## 2020-05-27 RX ORDER — AMLODIPINE BESYLATE 5 MG/1
TABLET ORAL
Qty: 180 TABLET | Refills: 3 | Status: SHIPPED | OUTPATIENT
Start: 2020-05-27 | End: 2021-04-24

## 2020-05-28 ENCOUNTER — PATIENT MESSAGE (OUTPATIENT)
Dept: FAMILY MEDICINE | Facility: CLINIC | Age: 75
End: 2020-05-28

## 2020-05-29 ENCOUNTER — PATIENT OUTREACH (OUTPATIENT)
Dept: ADMINISTRATIVE | Facility: HOSPITAL | Age: 75
End: 2020-05-29

## 2020-05-29 NOTE — TELEPHONE ENCOUNTER
"" Edin" in the early e-mail is his cardiologist Dr. Ward; he had checked his ferritin level before I could check it and his results came back at 900; and I agree with him that he needs to stop his iron supplements.  He was on ferrous sulfate 325 mg twice a day which I recommended that he stop as well.  He also needs to check his multivitamin to make sure there is no iron in that; and switch to 1 that does not have iron  "

## 2020-06-08 ENCOUNTER — OFFICE VISIT (OUTPATIENT)
Dept: FAMILY MEDICINE | Facility: CLINIC | Age: 75
End: 2020-06-08
Payer: MEDICARE

## 2020-06-08 VITALS
SYSTOLIC BLOOD PRESSURE: 178 MMHG | WEIGHT: 215.63 LBS | TEMPERATURE: 98 F | OXYGEN SATURATION: 95 % | DIASTOLIC BLOOD PRESSURE: 60 MMHG | HEART RATE: 76 BPM | BODY MASS INDEX: 30.87 KG/M2 | HEIGHT: 70 IN

## 2020-06-08 DIAGNOSIS — E66.3 OVERWEIGHT (BMI 25.0-29.9): ICD-10-CM

## 2020-06-08 DIAGNOSIS — E11.69 TYPE 2 DIABETES MELLITUS WITH OTHER SPECIFIED COMPLICATION, WITHOUT LONG-TERM CURRENT USE OF INSULIN: ICD-10-CM

## 2020-06-08 DIAGNOSIS — G47.33 OBSTRUCTIVE SLEEP APNEA: ICD-10-CM

## 2020-06-08 DIAGNOSIS — R60.0 BILATERAL LOWER EXTREMITY EDEMA: ICD-10-CM

## 2020-06-08 DIAGNOSIS — Z78.9 ALCOHOL USE: ICD-10-CM

## 2020-06-08 DIAGNOSIS — R59.0 MEDIASTINAL ADENOPATHY: ICD-10-CM

## 2020-06-08 DIAGNOSIS — Z95.5 S/P CORONARY ARTERY STENT PLACEMENT: ICD-10-CM

## 2020-06-08 DIAGNOSIS — I10 ESSENTIAL HYPERTENSION: Primary | ICD-10-CM

## 2020-06-08 DIAGNOSIS — E03.9 HYPOTHYROIDISM, UNSPECIFIED TYPE: ICD-10-CM

## 2020-06-08 DIAGNOSIS — D53.9 MACROCYTIC ANEMIA: ICD-10-CM

## 2020-06-08 DIAGNOSIS — E78.2 MIXED HYPERLIPIDEMIA: ICD-10-CM

## 2020-06-08 DIAGNOSIS — N18.30 CKD (CHRONIC KIDNEY DISEASE) STAGE 3, GFR 30-59 ML/MIN: ICD-10-CM

## 2020-06-08 PROCEDURE — 99499 UNLISTED E&M SERVICE: CPT | Mod: HCNC,S$GLB,, | Performed by: INTERNAL MEDICINE

## 2020-06-08 PROCEDURE — 3051F PR MOST RECENT HEMOGLOBIN A1C LEVEL 7.0 - < 8.0%: ICD-10-PCS | Mod: HCNC,CPTII,S$GLB, | Performed by: INTERNAL MEDICINE

## 2020-06-08 PROCEDURE — 3078F PR MOST RECENT DIASTOLIC BLOOD PRESSURE < 80 MM HG: ICD-10-PCS | Mod: HCNC,CPTII,S$GLB, | Performed by: INTERNAL MEDICINE

## 2020-06-08 PROCEDURE — 3077F SYST BP >= 140 MM HG: CPT | Mod: HCNC,CPTII,S$GLB, | Performed by: INTERNAL MEDICINE

## 2020-06-08 PROCEDURE — 1101F PT FALLS ASSESS-DOCD LE1/YR: CPT | Mod: HCNC,CPTII,S$GLB, | Performed by: INTERNAL MEDICINE

## 2020-06-08 PROCEDURE — 1101F PR PT FALLS ASSESS DOC 0-1 FALLS W/OUT INJ PAST YR: ICD-10-PCS | Mod: HCNC,CPTII,S$GLB, | Performed by: INTERNAL MEDICINE

## 2020-06-08 PROCEDURE — 3078F DIAST BP <80 MM HG: CPT | Mod: HCNC,CPTII,S$GLB, | Performed by: INTERNAL MEDICINE

## 2020-06-08 PROCEDURE — 1159F PR MEDICATION LIST DOCUMENTED IN MEDICAL RECORD: ICD-10-PCS | Mod: HCNC,S$GLB,, | Performed by: INTERNAL MEDICINE

## 2020-06-08 PROCEDURE — 3051F HG A1C>EQUAL 7.0%<8.0%: CPT | Mod: HCNC,CPTII,S$GLB, | Performed by: INTERNAL MEDICINE

## 2020-06-08 PROCEDURE — 1159F MED LIST DOCD IN RCRD: CPT | Mod: HCNC,S$GLB,, | Performed by: INTERNAL MEDICINE

## 2020-06-08 PROCEDURE — 99999 PR PBB SHADOW E&M-EST. PATIENT-LVL V: ICD-10-PCS | Mod: PBBFAC,HCNC,, | Performed by: INTERNAL MEDICINE

## 2020-06-08 PROCEDURE — 3077F PR MOST RECENT SYSTOLIC BLOOD PRESSURE >= 140 MM HG: ICD-10-PCS | Mod: HCNC,CPTII,S$GLB, | Performed by: INTERNAL MEDICINE

## 2020-06-08 PROCEDURE — 99999 PR PBB SHADOW E&M-EST. PATIENT-LVL V: CPT | Mod: PBBFAC,HCNC,, | Performed by: INTERNAL MEDICINE

## 2020-06-08 PROCEDURE — 99214 OFFICE O/P EST MOD 30 MIN: CPT | Mod: HCNC,S$GLB,, | Performed by: INTERNAL MEDICINE

## 2020-06-08 PROCEDURE — 99214 PR OFFICE/OUTPT VISIT, EST, LEVL IV, 30-39 MIN: ICD-10-PCS | Mod: HCNC,S$GLB,, | Performed by: INTERNAL MEDICINE

## 2020-06-08 PROCEDURE — 99499 RISK ADDL DX/OHS AUDIT: ICD-10-PCS | Mod: HCNC,S$GLB,, | Performed by: INTERNAL MEDICINE

## 2020-06-08 RX ORDER — LOSARTAN POTASSIUM 25 MG/1
25 TABLET ORAL DAILY
Qty: 30 TABLET | Refills: 2 | Status: SHIPPED | OUTPATIENT
Start: 2020-06-08 | End: 2020-09-08

## 2020-06-08 NOTE — PATIENT INSTRUCTIONS
Essential hypertension: add losartan 25 mg a day. Maintain < 2 Gm Na a day diet, and monitor BP at home; keep a log.  -     Basic metabolic panel; Future; Expected date: 06/08/2020  -     losartan (COZAAR) 25 MG tablet; Take 1 tablet (25 mg total) by mouth once daily.  Dispense: 30 tablet; Refill: 2    Type 2 diabetes mellitus with other specified complication, without long-term current use of insulin: Maintain a low carb diet; monitor CBG's at home; keep a log for review. Needs to wean off alcohol.   -     Basic metabolic panel; Future; Expected date: 06/08/2020  -     Hemoglobin A1C; Future; Expected date: 06/08/2020  -     losartan (COZAAR) 25 MG tablet; Take 1 tablet (25 mg total) by mouth once daily.  Dispense: 30 tablet; Refill: 2    Mixed hyperlipidemia> Maintain low fat high fiber diet, exercise regularly. Weight reduction where indicated. Cont atorvastatin  -     TSH; Future; Expected date: 06/08/2020  -     T4, free; Future; Expected date: 06/08/2020  -     T3, free; Future; Expected date: 06/08/2020  -     Lipid Panel; Future; Expected date: 06/08/2020    Hypothyroidism, unspecified type: needs to take levothyroxine 50 mcg po daily.   -     TSH; Future; Expected date: 06/08/2020  -     T4, free; Future; Expected date: 06/08/2020  -     T3, free; Future; Expected date: 06/08/2020    Macrocytic anemia: Men's 50+ MVI one a day.   -     CBC auto differential; Future; Expected date: 06/08/2020  -     Iron and TIBC; Future; Expected date: 06/08/2020  -     Ferritin; Future; Expected date: 06/08/2020  -     Reticulocytes; Future; Expected date: 06/08/2020  -     Vitamin B12; Future; Expected date: 06/08/2020  -     Folate; Future; Expected date: 06/08/2020    Alcohol use: on 12 per week; to wean off altogether gradually. Men's 50+ one a day. Increase MagOx 400 mg to 2 a day. Folate 400 mcg one a day.   -     CBC auto differential; Future; Expected date: 06/08/2020  -     Ferritin; Future; Expected date:  06/08/2020  -     Reticulocytes; Future; Expected date: 06/08/2020  -     Magnesium; Future; Expected date: 06/08/2020  -     Vitamin B12; Future; Expected date: 06/08/2020  -     Folate; Future; Expected date: 06/08/2020    Bilateral lower extremity edema:Maintain less than 2 g sodium diet; elevate lower extremities at home; use compression stockings if possible.    S/P coronary artery stent placement: has been stable; sees Dr Ward  -     losartan (COZAAR) 25 MG tablet; Take 1 tablet (25 mg total) by mouth once daily.  Dispense: 30 tablet; Refill: 2

## 2020-06-08 NOTE — PROGRESS NOTES
Subjective:       Patient ID: Jadiel Rossi is a 74 y.o. male.    Chief Complaint: Follow-up (review lab results)    HPI  here today for reassessment and go over his labs.     Essential hypertension:  Watches his salt intake; blood pressures at home has been averaging 150 over 60-66.  Has been drinking a lot of caffeine with diet Coke; discussed the need to change to decaf.  Discussed proper technique for taking his blood pressure.  The fact was brought out that he needs to sit for 4 min with his legs planted on the ground not elevated or crossed before checking his blood pressure.  Needs new blood pressure cuff present 1 is 2 years old     Obstructive sleep apnea:  Not using CPAP at night, request that he have Dr. Mccurdy address this; Seeing pulmonary Dr. Mccurdy; patient with a history mediastinal adenopathy with CT of the chest pending for further evaluation by Pulmonary.     CKD stage 3:  Knows to keep well hydrated; renal function has improved to 59.6.     Mixed hyperlipidemia:  On low-fat high-fiber diet or at least tries; tolerates atorvastatin fine.     Status post coronary artery stent placement:  No complaints of chest pain, shortness of breath, or palpitations.  His cardiologist is Dr. Ward; he has had a recent echo in lower extremity ultrasound results are pending.     Diabetes mellitus type 2:  Watches his carbohydrates; blood sugars running 125-130.  Fasting blood sugar 141.     Hypothyroidism:  Takes levothyroxine appropriately but only taking his levothyroxine 50 mcg averaging 2 times per week stressed the importance of taking it daily.  TSH 4.09.     Macrocytic anemia has improved from H&H 9.0/28.6 to presently 10.6/35.0 .    Alcohol use:  12 drinks per week; stressed the importance of weaning off alcohol.  Needs to increase Maxzide ox 400 mg to 2 a day to improve his magnesium level.     Total time 9:55 a.m. to 10:30 a.m..  Greater than 50% of time spent in discussion, counseling, and  "review.  Additional 24 min spent on supplemental documentation and review.      Review of Systems   Constitutional: Negative for appetite change and unexpected weight change.   HENT: Negative for congestion, postnasal drip, rhinorrhea and sinus pressure.         Denies seasonal allergies, or perennial allergies   Eyes: Negative for discharge and itching.   Respiratory: Negative for cough, chest tightness, shortness of breath and wheezing.    Cardiovascular: Negative for chest pain, palpitations and leg swelling.   Gastrointestinal: Negative for abdominal pain, blood in stool, constipation, diarrhea, nausea and vomiting.   Endocrine: Negative for polydipsia, polyphagia and polyuria.   Genitourinary: Negative for dysuria and hematuria.   Musculoskeletal: Negative for arthralgias and myalgias.   Skin: Negative for rash.   Allergic/Immunologic: Negative for environmental allergies and food allergies.   Neurological: Negative for tremors, seizures and headaches.   Hematological: Negative for adenopathy. Does not bruise/bleed easily.   Psychiatric/Behavioral:        Denies anxiety or depression.       Objective:      Vitals:    06/08/20 0922 06/08/20 0928 06/08/20 0929   BP: (!) 198/54 (!) 188/62 (!) 178/60   BP Location: Right arm     Patient Position: Sitting     BP Method: Large (Manual)     Pulse: 76     Temp: 97.9 °F (36.6 °C)     TempSrc: Oral     SpO2: 95%     Weight: 97.8 kg (215 lb 9.8 oz)     Height: 5' 10" (1.778 m)       Body mass index is 30.94 kg/m².  Wt Readings from Last 3 Encounters:   06/08/20 97.8 kg (215 lb 9.8 oz)   05/16/20 93.9 kg (207 lb)   04/29/20 97 kg (213 lb 13.5 oz)        Physical Exam   Constitutional: He is oriented to person, place, and time. He appears well-developed and well-nourished.   HENT:   Head: Normocephalic and atraumatic.   Eyes: EOM are normal.   Neck: Normal range of motion. Neck supple. No thyromegaly present.   No carotid bruits heard   Cardiovascular: Normal rate, regular " rhythm and normal heart sounds. Exam reveals no gallop.   No murmur heard.  Pulmonary/Chest: Effort normal and breath sounds normal. No respiratory distress. He has no wheezes. He has no rales.   Abdominal: Soft. Bowel sounds are normal. He exhibits no distension. There is no tenderness. There is no rebound and no guarding.   Musculoskeletal: Normal range of motion. He exhibits no edema.   Lymphadenopathy:     He has no cervical adenopathy.   Neurological: He is alert and oriented to person, place, and time.   Moves all 4 extremities fine.   Skin: No rash noted.   Psychiatric: He has a normal mood and affect. His behavior is normal. Thought content normal.   Vitals reviewed.      Assessment:       1. Essential hypertension    2. CKD (chronic kidney disease) stage 3, GFR 30-59 ml/min    3. Obstructive sleep apnea    4. Overweight (BMI 25.0-29.9)    5. Mediastinal adenopathy    6. Type 2 diabetes mellitus with other specified complication, without long-term current use of insulin    7. Mixed hyperlipidemia    8. S/P coronary artery stent placement    9. Hypothyroidism, unspecified type    10. Macrocytic anemia    11. Alcohol use    12. Bilateral lower extremity edema        Plan:       Essential hypertension: add losartan 25 mg a day. Maintain < 2 Gm Na a day diet, and monitor BP at home; keep a log.  Needs a new blood pressure cuff at home as present 1 is 2 years old; drinks a lot of diet Coke; needs to change to decaf.  And decrease the number of diet Cokes as decaf.  -     Basic metabolic panel; Future; Expected date: 06/08/2020  -     losartan (COZAAR) 25 MG tablet; Take 1 tablet (25 mg total) by mouth once daily.  Dispense: 30 tablet; Refill: 2    Chronic kidney disease stage 3:  Keep adequately hydrated and avoid NSA ID agents; GFR improved to 59.6.    Obstructive sleep apnea/mediastinal adenopathy:  To see Dr. Mccurdy as pulmonologist; CT of the chest pending; to also discuss issues with his prior CPAP  mask    Overweight:  05/05/2020 BMI 29.7; regular exercise 5 times a week minimum 30 min his ago with caloric restriction help his weight come down    Type 2 diabetes mellitus with other specified complication, without long-term current use of insulin: Maintain a low carb diet; monitor CBG's at home; keep a log for review. Needs to wean off alcohol.  Exercise along with weight reduction and watching his carbohydrate intake  -     Basic metabolic panel; Future; Expected date: 06/08/2020  -     Hemoglobin A1C; Future; Expected date: 06/08/2020  -     losartan (COZAAR) 25 MG tablet; Take 1 tablet (25 mg total) by mouth once daily.  Dispense: 30 tablet; Refill: 2    Mixed hyperlipidemia: Maintain low fat high fiber diet, exercise regularly. Weight reduction where indicated. Cont atorvastatin  -     TSH; Future; Expected date: 06/08/2020  -     T4, free; Future; Expected date: 06/08/2020  -     T3, free; Future; Expected date: 06/08/2020  -     Lipid Panel; Future; Expected date: 06/08/2020    S/P coronary artery stent placement: has been stable; sees Dr Ward  -     losartan (COZAAR) 25 MG tablet; Take 1 tablet (25 mg total) by mouth once daily.  Dispense: 30 tablet; Refill: 2    Hypothyroidism, unspecified type: needs to take levothyroxine 50 mcg po daily.  Presently averaging 2 times a week.  Thyroid function test for follow-up  -     TSH; Future; Expected date: 06/08/2020  -     T4, free; Future; Expected date: 06/08/2020  -     T3, free; Future; Expected date: 06/08/2020    Macrocytic anemia: Men's 50+ MVI one a day.  To wean off alcohol intake altogether; men's 50+ multivitamin 1 a day; total colonoscopy 18 months ago reportedly was david Solomon 5 year follow-up  -     CBC auto differential; Future; Expected date: 06/08/2020  -     Iron and TIBC; Future; Expected date: 06/08/2020  -     Ferritin; Future; Expected date: 06/08/2020  -     Reticulocytes; Future; Expected date: 06/08/2020  -     Vitamin B12; Future;  Expected date: 06/08/2020  -     Folate; Future; Expected date: 06/08/2020    Alcohol use: on 12 per week; to wean off altogether gradually. Men's 50+ one a day. Increase MagOx 400 mg to 2 a day. Folate 400 mcg one a day.   -     CBC auto differential; Future; Expected date: 06/08/2020  -     Ferritin; Future; Expected date: 06/08/2020  -     Reticulocytes; Future; Expected date: 06/08/2020  -     Magnesium; Future; Expected date: 06/08/2020  -     Vitamin B12; Future; Expected date: 06/08/2020  -     Folate; Future; Expected date: 06/08/2020    Bilateral lower extremity edema: Maintain less than 2 g sodium diet; elevate lower extremities at home; use compression stockings if possible.

## 2020-06-15 ENCOUNTER — PES CALL (OUTPATIENT)
Dept: ADMINISTRATIVE | Facility: CLINIC | Age: 75
End: 2020-06-15

## 2020-06-30 DIAGNOSIS — Z95.5 S/P CORONARY ARTERY STENT PLACEMENT: ICD-10-CM

## 2020-06-30 DIAGNOSIS — E78.2 MIXED HYPERLIPIDEMIA: ICD-10-CM

## 2020-07-02 DIAGNOSIS — E11.9 TYPE 2 DIABETES MELLITUS WITHOUT COMPLICATION, UNSPECIFIED WHETHER LONG TERM INSULIN USE: ICD-10-CM

## 2020-07-03 RX ORDER — ATORVASTATIN CALCIUM 40 MG/1
TABLET, FILM COATED ORAL
Qty: 90 TABLET | Refills: 1 | Status: SHIPPED | OUTPATIENT
Start: 2020-07-03 | End: 2020-12-31

## 2020-07-10 ENCOUNTER — PATIENT OUTREACH (OUTPATIENT)
Dept: ADMINISTRATIVE | Facility: HOSPITAL | Age: 75
End: 2020-07-10

## 2020-07-10 NOTE — PROGRESS NOTES
Chart review completed 07/10/2020  Care Everywhere updates requested and reviewed.  Immunizations reconciled. Media reviewed.     Health Maintenance Due   Topic Date Due    Shingles Vaccine (1 of 2) 12/31/1996    Eye Exam  05/25/2019    Pneumococcal Vaccine (65+ Low/Medium Risk) (2 of 2 - PPSV23) 09/10/2019    Foot Exam  09/10/2019

## 2020-07-27 ENCOUNTER — PATIENT OUTREACH (OUTPATIENT)
Dept: ADMINISTRATIVE | Facility: HOSPITAL | Age: 75
End: 2020-07-27

## 2020-07-27 NOTE — PROGRESS NOTES
Chart review completed 2020.  Care Everywhere updates requested and reviewed.  Immunizations reconciled. Media reports reviewed.  Duplicate HM overrides and  orders removed.  Overdue HM topic chart audit and/or requested.  Overdue lab testing linked to upcoming lab appointments if applies.        Health Maintenance Due   Topic Date Due    Shingles Vaccine (1 of 2) 1996    Eye Exam  2019    Pneumococcal Vaccine (65+ Low/Medium Risk) (2 of 2 - PPSV23) 09/10/2019    Foot Exam  09/10/2019

## 2020-07-28 RX ORDER — ALBUTEROL SULFATE 90 UG/1
AEROSOL, METERED RESPIRATORY (INHALATION)
Qty: 18 G | Refills: 2 | Status: SHIPPED | OUTPATIENT
Start: 2020-07-28 | End: 2020-10-20

## 2020-08-13 ENCOUNTER — PATIENT OUTREACH (OUTPATIENT)
Dept: ADMINISTRATIVE | Facility: HOSPITAL | Age: 75
End: 2020-08-13

## 2020-08-13 NOTE — PROGRESS NOTES
Health Maintenance Due   Topic Date Due    Shingles Vaccine (1 of 2) 1996    Eye Exam  2019    Pneumococcal Vaccine (65+ Low/Medium Risk) (2 of 2 - PPSV23) 09/10/2019    Foot Exam  09/10/2019       Chart review completed 2020.  Care Everywhere updates requested and reviewed.  Immunizations reconciled. Media reports reviewed.  Duplicate HM overrides and  orders removed.  Overdue HM topic chart audit and/or requested.  Overdue lab testing linked to upcoming lab appointments if applies.

## 2020-09-04 ENCOUNTER — PATIENT OUTREACH (OUTPATIENT)
Dept: ADMINISTRATIVE | Facility: HOSPITAL | Age: 75
End: 2020-09-04

## 2020-09-04 NOTE — PROGRESS NOTES
Health Maintenance Due   Topic Date Due    Shingles Vaccine (1 of 2) 1996    Eye Exam  2019    Pneumococcal Vaccine (65+ Low/Medium Risk) (2 of 2 - PPSV23) 09/10/2019    Foot Exam  09/10/2019    Influenza Vaccine (1) 2020    Hemoglobin A1c  2020       Chart review completed 2020.  Care Everywhere updates requested and reviewed.  Immunizations reconciled. Media reports reviewed.  Duplicate HM overrides and  orders removed.  Overdue HM topic chart audit and/or requested.  Overdue lab testing linked to upcoming lab appointments if applies.

## 2020-09-16 ENCOUNTER — LAB VISIT (OUTPATIENT)
Dept: LAB | Facility: HOSPITAL | Age: 75
End: 2020-09-16
Attending: INTERNAL MEDICINE
Payer: MEDICARE

## 2020-09-16 DIAGNOSIS — E11.69 TYPE 2 DIABETES MELLITUS WITH OTHER SPECIFIED COMPLICATION, WITHOUT LONG-TERM CURRENT USE OF INSULIN: ICD-10-CM

## 2020-09-16 DIAGNOSIS — D53.9 MACROCYTIC ANEMIA: ICD-10-CM

## 2020-09-16 DIAGNOSIS — I10 ESSENTIAL HYPERTENSION: ICD-10-CM

## 2020-09-16 DIAGNOSIS — E78.2 MIXED HYPERLIPIDEMIA: ICD-10-CM

## 2020-09-16 DIAGNOSIS — Z78.9 ALCOHOL USE: ICD-10-CM

## 2020-09-16 DIAGNOSIS — E03.9 HYPOTHYROIDISM, UNSPECIFIED TYPE: ICD-10-CM

## 2020-09-16 LAB
ANION GAP SERPL CALC-SCNC: 11 MMOL/L (ref 8–16)
BASOPHILS # BLD AUTO: 0.07 K/UL (ref 0–0.2)
BASOPHILS NFR BLD: 0.8 % (ref 0–1.9)
BUN SERPL-MCNC: 27 MG/DL (ref 8–23)
CALCIUM SERPL-MCNC: 9.6 MG/DL (ref 8.7–10.5)
CHLORIDE SERPL-SCNC: 97 MMOL/L (ref 95–110)
CHOLEST SERPL-MCNC: 127 MG/DL (ref 120–199)
CHOLEST/HDLC SERPL: 3.3 {RATIO} (ref 2–5)
CO2 SERPL-SCNC: 33 MMOL/L (ref 23–29)
CREAT SERPL-MCNC: 1.3 MG/DL (ref 0.5–1.4)
DIFFERENTIAL METHOD: ABNORMAL
EOSINOPHIL # BLD AUTO: 0.5 K/UL (ref 0–0.5)
EOSINOPHIL NFR BLD: 5.4 % (ref 0–8)
ERYTHROCYTE [DISTWIDTH] IN BLOOD BY AUTOMATED COUNT: 13.6 % (ref 11.5–14.5)
EST. GFR  (AFRICAN AMERICAN): >60 ML/MIN/1.73 M^2
EST. GFR  (NON AFRICAN AMERICAN): 54.1 ML/MIN/1.73 M^2
FERRITIN SERPL-MCNC: 386 NG/ML (ref 20–300)
FOLATE SERPL-MCNC: 17.3 NG/ML (ref 4–24)
GLUCOSE SERPL-MCNC: 121 MG/DL (ref 70–110)
HCT VFR BLD AUTO: 35.8 % (ref 40–54)
HDLC SERPL-MCNC: 39 MG/DL (ref 40–75)
HDLC SERPL: 30.7 % (ref 20–50)
HGB BLD-MCNC: 11.4 G/DL (ref 14–18)
IMM GRANULOCYTES # BLD AUTO: 0.03 K/UL (ref 0–0.04)
IMM GRANULOCYTES NFR BLD AUTO: 0.3 % (ref 0–0.5)
IRON SERPL-MCNC: 65 UG/DL (ref 45–160)
LDLC SERPL CALC-MCNC: 54.6 MG/DL (ref 63–159)
LYMPHOCYTES # BLD AUTO: 2.2 K/UL (ref 1–4.8)
LYMPHOCYTES NFR BLD: 25.1 % (ref 18–48)
MAGNESIUM SERPL-MCNC: 1.8 MG/DL (ref 1.6–2.6)
MCH RBC QN AUTO: 30.9 PG (ref 27–31)
MCHC RBC AUTO-ENTMCNC: 31.8 G/DL (ref 32–36)
MCV RBC AUTO: 97 FL (ref 82–98)
MONOCYTES # BLD AUTO: 1 K/UL (ref 0.3–1)
MONOCYTES NFR BLD: 10.8 % (ref 4–15)
NEUTROPHILS # BLD AUTO: 5.1 K/UL (ref 1.8–7.7)
NEUTROPHILS NFR BLD: 57.6 % (ref 38–73)
NONHDLC SERPL-MCNC: 88 MG/DL
NRBC BLD-RTO: 0 /100 WBC
PLATELET # BLD AUTO: 205 K/UL (ref 150–350)
PMV BLD AUTO: 10.5 FL (ref 9.2–12.9)
POTASSIUM SERPL-SCNC: 4.3 MMOL/L (ref 3.5–5.1)
RBC # BLD AUTO: 3.69 M/UL (ref 4.6–6.2)
RETICS/RBC NFR AUTO: 1.7 % (ref 0.4–2)
SATURATED IRON: 20 % (ref 20–50)
SODIUM SERPL-SCNC: 141 MMOL/L (ref 136–145)
T3FREE SERPL-MCNC: 2.4 PG/ML (ref 2.3–4.2)
T4 FREE SERPL-MCNC: 0.97 NG/DL (ref 0.71–1.51)
TOTAL IRON BINDING CAPACITY: 318 UG/DL (ref 250–450)
TRANSFERRIN SERPL-MCNC: 215 MG/DL (ref 200–375)
TRIGL SERPL-MCNC: 167 MG/DL (ref 30–150)
TSH SERPL DL<=0.005 MIU/L-ACNC: 3.96 UIU/ML (ref 0.4–4)
VIT B12 SERPL-MCNC: 523 PG/ML (ref 210–950)
WBC # BLD AUTO: 8.78 K/UL (ref 3.9–12.7)

## 2020-09-16 PROCEDURE — 84481 FREE ASSAY (FT-3): CPT | Mod: HCNC

## 2020-09-16 PROCEDURE — 84439 ASSAY OF FREE THYROXINE: CPT | Mod: HCNC

## 2020-09-16 PROCEDURE — 85045 AUTOMATED RETICULOCYTE COUNT: CPT | Mod: HCNC

## 2020-09-16 PROCEDURE — 82728 ASSAY OF FERRITIN: CPT | Mod: HCNC

## 2020-09-16 PROCEDURE — 83036 HEMOGLOBIN GLYCOSYLATED A1C: CPT | Mod: HCNC

## 2020-09-16 PROCEDURE — 80048 BASIC METABOLIC PNL TOTAL CA: CPT | Mod: HCNC

## 2020-09-16 PROCEDURE — 82746 ASSAY OF FOLIC ACID SERUM: CPT | Mod: HCNC

## 2020-09-16 PROCEDURE — 84443 ASSAY THYROID STIM HORMONE: CPT | Mod: HCNC

## 2020-09-16 PROCEDURE — 83540 ASSAY OF IRON: CPT | Mod: HCNC

## 2020-09-16 PROCEDURE — 82607 VITAMIN B-12: CPT | Mod: HCNC

## 2020-09-16 PROCEDURE — 80061 LIPID PANEL: CPT | Mod: HCNC

## 2020-09-16 PROCEDURE — 36415 COLL VENOUS BLD VENIPUNCTURE: CPT | Mod: HCNC,PN

## 2020-09-16 PROCEDURE — 83735 ASSAY OF MAGNESIUM: CPT | Mod: HCNC

## 2020-09-16 PROCEDURE — 85025 COMPLETE CBC W/AUTO DIFF WBC: CPT | Mod: HCNC

## 2020-09-17 LAB
ESTIMATED AVG GLUCOSE: 151 MG/DL (ref 68–131)
HBA1C MFR BLD HPLC: 6.9 % (ref 4–5.6)

## 2020-09-23 DIAGNOSIS — I10 ESSENTIAL HYPERTENSION: ICD-10-CM

## 2020-09-23 DIAGNOSIS — N28.9 ACUTE RENAL INSUFFICIENCY: ICD-10-CM

## 2020-09-24 ENCOUNTER — OFFICE VISIT (OUTPATIENT)
Dept: FAMILY MEDICINE | Facility: CLINIC | Age: 75
End: 2020-09-24
Payer: MEDICARE

## 2020-09-24 VITALS
OXYGEN SATURATION: 96 % | DIASTOLIC BLOOD PRESSURE: 52 MMHG | HEIGHT: 70 IN | TEMPERATURE: 98 F | HEART RATE: 65 BPM | WEIGHT: 215.38 LBS | SYSTOLIC BLOOD PRESSURE: 144 MMHG | BODY MASS INDEX: 30.83 KG/M2

## 2020-09-24 DIAGNOSIS — I10 ESSENTIAL HYPERTENSION: Primary | ICD-10-CM

## 2020-09-24 DIAGNOSIS — Z12.5 PROSTATE CANCER SCREENING: ICD-10-CM

## 2020-09-24 DIAGNOSIS — E11.69 TYPE 2 DIABETES MELLITUS WITH OTHER SPECIFIED COMPLICATION, WITHOUT LONG-TERM CURRENT USE OF INSULIN: ICD-10-CM

## 2020-09-24 DIAGNOSIS — R60.0 BILATERAL LOWER EXTREMITY EDEMA: ICD-10-CM

## 2020-09-24 DIAGNOSIS — N18.30 CKD (CHRONIC KIDNEY DISEASE) STAGE 3, GFR 30-59 ML/MIN: ICD-10-CM

## 2020-09-24 DIAGNOSIS — G47.33 OBSTRUCTIVE SLEEP APNEA: ICD-10-CM

## 2020-09-24 DIAGNOSIS — Z95.5 S/P CORONARY ARTERY STENT PLACEMENT: ICD-10-CM

## 2020-09-24 DIAGNOSIS — E03.9 HYPOTHYROIDISM, UNSPECIFIED TYPE: ICD-10-CM

## 2020-09-24 DIAGNOSIS — D64.9 ANEMIA, UNSPECIFIED TYPE: ICD-10-CM

## 2020-09-24 PROCEDURE — 1101F PR PT FALLS ASSESS DOC 0-1 FALLS W/OUT INJ PAST YR: ICD-10-PCS | Mod: HCNC,CPTII,S$GLB, | Performed by: INTERNAL MEDICINE

## 2020-09-24 PROCEDURE — 99999 PR PBB SHADOW E&M-EST. PATIENT-LVL IV: ICD-10-PCS | Mod: PBBFAC,HCNC,, | Performed by: INTERNAL MEDICINE

## 2020-09-24 PROCEDURE — 1159F PR MEDICATION LIST DOCUMENTED IN MEDICAL RECORD: ICD-10-PCS | Mod: HCNC,S$GLB,, | Performed by: INTERNAL MEDICINE

## 2020-09-24 PROCEDURE — 1159F MED LIST DOCD IN RCRD: CPT | Mod: HCNC,S$GLB,, | Performed by: INTERNAL MEDICINE

## 2020-09-24 PROCEDURE — 3044F PR MOST RECENT HEMOGLOBIN A1C LEVEL <7.0%: ICD-10-PCS | Mod: HCNC,CPTII,S$GLB, | Performed by: INTERNAL MEDICINE

## 2020-09-24 PROCEDURE — 99999 PR PBB SHADOW E&M-EST. PATIENT-LVL IV: CPT | Mod: PBBFAC,HCNC,, | Performed by: INTERNAL MEDICINE

## 2020-09-24 PROCEDURE — 3078F PR MOST RECENT DIASTOLIC BLOOD PRESSURE < 80 MM HG: ICD-10-PCS | Mod: HCNC,CPTII,S$GLB, | Performed by: INTERNAL MEDICINE

## 2020-09-24 PROCEDURE — 3077F SYST BP >= 140 MM HG: CPT | Mod: HCNC,CPTII,S$GLB, | Performed by: INTERNAL MEDICINE

## 2020-09-24 PROCEDURE — G0008 ADMIN INFLUENZA VIRUS VAC: HCPCS | Mod: HCNC,S$GLB,, | Performed by: INTERNAL MEDICINE

## 2020-09-24 PROCEDURE — 90694 VACC AIIV4 NO PRSRV 0.5ML IM: CPT | Mod: HCNC,S$GLB,, | Performed by: INTERNAL MEDICINE

## 2020-09-24 PROCEDURE — 99499 RISK ADDL DX/OHS AUDIT: ICD-10-PCS | Mod: S$GLB,,, | Performed by: INTERNAL MEDICINE

## 2020-09-24 PROCEDURE — 3008F PR BODY MASS INDEX (BMI) DOCUMENTED: ICD-10-PCS | Mod: HCNC,CPTII,S$GLB, | Performed by: INTERNAL MEDICINE

## 2020-09-24 PROCEDURE — 90694 FLU VACCINE - QUADRIVALENT - ADJUVANTED: ICD-10-PCS | Mod: HCNC,S$GLB,, | Performed by: INTERNAL MEDICINE

## 2020-09-24 PROCEDURE — 99214 PR OFFICE/OUTPT VISIT, EST, LEVL IV, 30-39 MIN: ICD-10-PCS | Mod: HCNC,25,S$GLB, | Performed by: INTERNAL MEDICINE

## 2020-09-24 PROCEDURE — 1101F PT FALLS ASSESS-DOCD LE1/YR: CPT | Mod: HCNC,CPTII,S$GLB, | Performed by: INTERNAL MEDICINE

## 2020-09-24 PROCEDURE — 3008F BODY MASS INDEX DOCD: CPT | Mod: HCNC,CPTII,S$GLB, | Performed by: INTERNAL MEDICINE

## 2020-09-24 PROCEDURE — 99214 OFFICE O/P EST MOD 30 MIN: CPT | Mod: HCNC,25,S$GLB, | Performed by: INTERNAL MEDICINE

## 2020-09-24 PROCEDURE — 3078F DIAST BP <80 MM HG: CPT | Mod: HCNC,CPTII,S$GLB, | Performed by: INTERNAL MEDICINE

## 2020-09-24 PROCEDURE — 3044F HG A1C LEVEL LT 7.0%: CPT | Mod: HCNC,CPTII,S$GLB, | Performed by: INTERNAL MEDICINE

## 2020-09-24 PROCEDURE — 99499 UNLISTED E&M SERVICE: CPT | Mod: S$GLB,,, | Performed by: INTERNAL MEDICINE

## 2020-09-24 PROCEDURE — G0008 PR ADMIN INFLUENZA VIRUS VAC: ICD-10-PCS | Mod: HCNC,S$GLB,, | Performed by: INTERNAL MEDICINE

## 2020-09-24 PROCEDURE — 3077F PR MOST RECENT SYSTOLIC BLOOD PRESSURE >= 140 MM HG: ICD-10-PCS | Mod: HCNC,CPTII,S$GLB, | Performed by: INTERNAL MEDICINE

## 2020-09-24 RX ORDER — INDAPAMIDE 1.25 MG/1
TABLET ORAL
Qty: 90 TABLET | Refills: 1 | Status: SHIPPED | OUTPATIENT
Start: 2020-09-24 | End: 2021-03-24

## 2020-09-24 RX ORDER — LEVOTHYROXINE SODIUM 125 UG/1
TABLET ORAL
Qty: 50 TABLET | Refills: 3 | Status: SHIPPED | OUTPATIENT
Start: 2020-09-24 | End: 2022-01-04

## 2020-09-24 RX ORDER — FUROSEMIDE 20 MG/1
TABLET ORAL
COMMUNITY
Start: 2020-09-15 | End: 2022-01-04

## 2020-09-24 RX ORDER — DOXAZOSIN 1 MG/1
TABLET ORAL
Qty: 90 TABLET | Refills: 1 | Status: SHIPPED | OUTPATIENT
Start: 2020-09-24 | End: 2021-04-24

## 2020-09-24 RX ORDER — METFORMIN HYDROCHLORIDE 500 MG/1
500 TABLET ORAL 2 TIMES DAILY
COMMUNITY
Start: 2020-08-12 | End: 2021-02-09

## 2020-09-24 RX ORDER — LANOLIN ALCOHOL/MO/W.PET/CERES
400 CREAM (GRAM) TOPICAL DAILY
COMMUNITY
End: 2021-09-23

## 2020-09-24 NOTE — PROGRESS NOTES
Subjective:       Patient ID: Jadiel Rossi is a 74 y.o. male.    Chief Complaint: Follow-up (review lab results) and Immunizations    HPI  here today for reassessment and go over his labs.     Essential hypertension:  Blood pressures been averaging 140s to 150s over 64-68.  He does need to obtain a new cuff is present cuff is 2 years old.  He also needs to sit and wait at least 4 min before checking his blood pressure reading which will help.  Here he is 144/52 manually.  Add Cardura 1 mg per day between 4 and 6:00 p.m..  For better blood pressure control.     CKD-stage III:  Knows not to take any NSA ID agents; can use Tylenol over-the-counter for pain.  GFR slightly worse at 54.1 with creatinine 1.3.  Using Lasix 3-4 times per week at 20 mg     Obstructive sleep apnea:  Can not tolerate CPAP mask.  Continue attempts at weight reduction will help.     Diabetes mellitus type 2:  Watches his carbohydrates or at least tries blood sugars have been averaging 128-132.  .  Hemoglobin A1c 6.9     Mixed hyperlipidemia:  On low-fat high-fiber diet; tolerates atorvastatin 40 mg a day fine.  Recent lipid profile:  Cholesterol 127/triglyceride 167/HDL 39/LDL 54.6 triglyceride has improved from 303; LDL goal less than 60 with 2 coronary stents.     Obesity:  BMI 30.91; a with a value of regular exercise and small portions to help his weight come down; weight the same as 06/08/2020; for exercise walks 3 times a week for 15 min have requested of for goal he increase that to 30 min each time.     Hypothyroidism:  Takes his levothyroxine appropriately; on 50 mcg per day.  TSH is 3.95 so will increase levothyroxine to 62.5 g per day recheck thyroid function test on follow-up     Coronary artery disease/status post coronary artery stent placement x2:  No complaints of chest pain, shortness of breath, or palpitations.     Total time 4:08- 4:45 p.m..  Greater than 50% of time spent in discussion, counseling, and review.  Labs  "reviewed with patient at length in ordered for follow-up.      Review of Systems   Constitutional: Negative for appetite change and unexpected weight change.   HENT: Negative for congestion, postnasal drip, rhinorrhea and sinus pressure.         Denies seasonal allergies, or perennial allergies   Eyes: Negative for discharge and itching.   Respiratory: Negative for cough, chest tightness, shortness of breath and wheezing.    Cardiovascular: Negative for chest pain, palpitations and leg swelling.   Gastrointestinal: Negative for abdominal pain, blood in stool, constipation, diarrhea, nausea and vomiting.   Endocrine: Negative for polydipsia, polyphagia and polyuria.   Genitourinary: Negative for dysuria and hematuria.   Musculoskeletal: Negative for arthralgias and myalgias.   Skin: Negative for rash.   Allergic/Immunologic: Negative for environmental allergies and food allergies.   Neurological: Negative for tremors, seizures and headaches.   Hematological: Negative for adenopathy. Does not bruise/bleed easily.   Psychiatric/Behavioral:        Denies anxiety or depression.       Objective:      Vitals:    09/24/20 1522 09/24/20 1532   BP: (!) 152/64 (!) 144/52   BP Location: Right arm Right arm   Patient Position: Sitting Sitting   BP Method: Large (Manual) Large (Manual)   Pulse: 65    Temp: 97.9 °F (36.6 °C)    TempSrc: Temporal    SpO2: 96%    Weight: 97.7 kg (215 lb 6.2 oz)    Height: 5' 10" (1.778 m)      Body mass index is 30.91 kg/m².  Wt Readings from Last 3 Encounters:   09/24/20 97.7 kg (215 lb 6.2 oz)   06/08/20 97.8 kg (215 lb 9.8 oz)   05/16/20 93.9 kg (207 lb)        Physical Exam  Vitals signs reviewed.   Constitutional:       Appearance: He is well-developed.   HENT:      Head: Normocephalic and atraumatic.   Neck:      Musculoskeletal: Normal range of motion and neck supple.      Thyroid: No thyromegaly.   Cardiovascular:      Rate and Rhythm: Normal rate and regular rhythm.      Heart sounds: " Normal heart sounds. No murmur. No gallop.    Pulmonary:      Effort: Pulmonary effort is normal. No respiratory distress.      Breath sounds: Normal breath sounds. No wheezing or rales.   Abdominal:      General: Bowel sounds are normal. There is no distension.      Palpations: Abdomen is soft.      Tenderness: There is no abdominal tenderness. There is no guarding or rebound.   Musculoskeletal: Normal range of motion.   Lymphadenopathy:      Cervical: No cervical adenopathy.   Skin:     Findings: No rash.   Neurological:      Mental Status: He is alert and oriented to person, place, and time.      Comments: Moves all 4 extremities fine.   Psychiatric:         Behavior: Behavior normal.         Thought Content: Thought content normal.         Assessment:       1. Essential hypertension    2. CKD (chronic kidney disease) stage 3, GFR 30-59 ml/min    3. Obstructive sleep apnea    4. Type 2 diabetes mellitus with other specified complication, without long-term current use of insulin    5. S/P coronary artery stent placement    6. Bilateral lower extremity edema    7. Hypothyroidism, unspecified type    8. Anemia, unspecified type    9. Prostate cancer screening        Plan:       Essential hypertension: Maintain < 2 Gm Na a day diet, and monitor BP at home; keep a log. Sit 4 minutes before taking BP  -     doxazosin (CARDURA) 1 MG tablet; 1 mg po every 4-6 pm  Dispense: 90 tablet; Refill: 1  -     T4, free; Future; Expected date: 09/24/2020  -     T3, free; Future; Expected date: 09/24/2020  -     TSH; Future; Expected date: 09/24/2020  -     Comprehensive metabolic panel; Future; Expected date: 09/24/2020  -     Lipid Panel; Future; Expected date: 09/24/2020    CKD (chronic kidney disease) stage 3, GFR 30-59 ml/min: push fluids during day; No NSAID's; can use tylenol for pain  -     Comprehensive metabolic panel; Future; Expected date: 09/24/2020    Obstructive sleep apnea: Caloric restriction w regular exercise and  weight reduction.    Type 2 diabetes mellitus with other specified complication, without long-term current use of insulin;Maintain a low carb diet; monitor CBG's at home; keep a log for review.  -     Comprehensive metabolic panel; Future; Expected date: 09/24/2020  -     Hemoglobin A1C; Future; Expected date: 09/24/2020    S/P coronary artery stent placement: has been stable. Card is Dr Ward    Bilateral lower extremity edema: doing a lot better. Maintain less than 2 g sodium diet; elevate lower extremities at home; use compression stockings if possible.    Hypothyroidism, unspecified type: increase levothyroxine to 62.5 mcg a day.   -     levothyroxine (SYNTHROID) 125 MCG tablet; 1/2 of 125 mcg po daily.  Dispense: 50 tablet; Refill: 3  -     T4, free; Future; Expected date: 09/24/2020  -     T3, free; Future; Expected date: 09/24/2020  -     TSH; Future; Expected date: 09/24/2020    Anemia, unspecified type; renal vitamin otc. Pro-renal + D  -     T4, free; Future; Expected date: 09/24/2020  -     T3, free; Future; Expected date: 09/24/2020  -     TSH; Future; Expected date: 09/24/2020  -     CBC auto differential; Future; Expected date: 09/24/2020    Prostate cancer screening:  PSA on follow-up labs

## 2020-09-24 NOTE — Clinical Note
Please schedule patient has follow-up visit and 3 months with overnight fasting labs 1 week prior thank you

## 2020-09-24 NOTE — PATIENT INSTRUCTIONS
Essential hypertension: Maintain < 2 Gm Na a day diet, and monitor BP at home; keep a log. Sit 4 minutes before taking BP  -     doxazosin (CARDURA) 1 MG tablet; 1 mg po every 4-6 pm  Dispense: 90 tablet; Refill: 1  -     T4, free; Future; Expected date: 09/24/2020  -     T3, free; Future; Expected date: 09/24/2020  -     TSH; Future; Expected date: 09/24/2020  -     Comprehensive metabolic panel; Future; Expected date: 09/24/2020  -     Lipid Panel; Future; Expected date: 09/24/2020    CKD (chronic kidney disease) stage 3, GFR 30-59 ml/min: push fluids during day; No NSAID's; can use tylenol for pain  -     Comprehensive metabolic panel; Future; Expected date: 09/24/2020    Obstructive sleep apnea: Caloric restriction w regular exercise and weight reduction.    Type 2 diabetes mellitus with other specified complication, without long-term current use of insulin;Maintain a low carb diet; monitor CBG's at home; keep a log for review.  -     Comprehensive metabolic panel; Future; Expected date: 09/24/2020  -     Hemoglobin A1C; Future; Expected date: 09/24/2020    S/P coronary artery stent placement: has been stable. Sriram Ward    Bilateral lower extremity edema: doing a lot better. Maintain less than 2 g sodium diet; elevate lower extremities at home; use compression stockings if possible.    Hypothyroidism, unspecified type: increase levothyroxine to 62.5 mcg a day.   -     levothyroxine (SYNTHROID) 125 MCG tablet; 1/2 of 125 mcg po daily.  Dispense: 50 tablet; Refill: 3  -     T4, free; Future; Expected date: 09/24/2020  -     T3, free; Future; Expected date: 09/24/2020  -     TSH; Future; Expected date: 09/24/2020    Anemia, unspecified type; renal vitamin otc. Pro-renal + D  -     T4, free; Future; Expected date: 09/24/2020  -     T3, free; Future; Expected date: 09/24/2020  -     TSH; Future; Expected date: 09/24/2020  -     CBC auto differential; Future; Expected date: 09/24/2020

## 2020-09-29 ENCOUNTER — PATIENT MESSAGE (OUTPATIENT)
Dept: OTHER | Facility: OTHER | Age: 75
End: 2020-09-29

## 2020-10-05 ENCOUNTER — PATIENT MESSAGE (OUTPATIENT)
Dept: ADMINISTRATIVE | Facility: HOSPITAL | Age: 75
End: 2020-10-05

## 2020-10-09 NOTE — TELEPHONE ENCOUNTER
Patient was called to touch bases with him on his hypertensive management but got a recorder and left of voicemail message.  Advised him stop the doxazosin as it was not agreeing with him.  Continue to watch his salt intake closer; he was also advised earlier to sit for good 4 min before checking his blood pressure and to obtain a new blood pressure cuff.  I asked him to call the office on Monday to let us know how his blood pressure is doing.  Please check up on him on Monday for his blood pressure status and see how it has been doing.

## 2020-10-12 ENCOUNTER — PATIENT MESSAGE (OUTPATIENT)
Dept: ADMINISTRATIVE | Facility: HOSPITAL | Age: 75
End: 2020-10-12

## 2020-10-20 RX ORDER — ALBUTEROL SULFATE 90 UG/1
AEROSOL, METERED RESPIRATORY (INHALATION)
Qty: 18 G | Refills: 2 | Status: SHIPPED | OUTPATIENT
Start: 2020-10-20 | End: 2021-01-16

## 2020-11-23 ENCOUNTER — PATIENT MESSAGE (OUTPATIENT)
Dept: FAMILY MEDICINE | Facility: CLINIC | Age: 75
End: 2020-11-23

## 2020-12-11 ENCOUNTER — PATIENT MESSAGE (OUTPATIENT)
Dept: OTHER | Facility: OTHER | Age: 75
End: 2020-12-11

## 2020-12-28 DIAGNOSIS — E78.2 MIXED HYPERLIPIDEMIA: ICD-10-CM

## 2020-12-28 DIAGNOSIS — Z95.5 S/P CORONARY ARTERY STENT PLACEMENT: ICD-10-CM

## 2020-12-31 RX ORDER — ATORVASTATIN CALCIUM 40 MG/1
TABLET, FILM COATED ORAL
Qty: 90 TABLET | Refills: 1 | Status: SHIPPED | OUTPATIENT
Start: 2020-12-31 | End: 2021-08-04

## 2021-01-03 ENCOUNTER — PATIENT MESSAGE (OUTPATIENT)
Dept: FAMILY MEDICINE | Facility: CLINIC | Age: 76
End: 2021-01-03

## 2021-01-04 ENCOUNTER — PATIENT MESSAGE (OUTPATIENT)
Dept: ADMINISTRATIVE | Facility: HOSPITAL | Age: 76
End: 2021-01-04

## 2021-01-06 ENCOUNTER — PATIENT OUTREACH (OUTPATIENT)
Dept: ADMINISTRATIVE | Facility: HOSPITAL | Age: 76
End: 2021-01-06

## 2021-01-06 ENCOUNTER — PATIENT MESSAGE (OUTPATIENT)
Dept: FAMILY MEDICINE | Facility: CLINIC | Age: 76
End: 2021-01-06

## 2021-01-10 ENCOUNTER — IMMUNIZATION (OUTPATIENT)
Dept: FAMILY MEDICINE | Facility: CLINIC | Age: 76
End: 2021-01-10
Payer: MEDICARE

## 2021-01-10 DIAGNOSIS — Z23 NEED FOR VACCINATION: ICD-10-CM

## 2021-01-10 PROCEDURE — 91300 COVID-19, MRNA, LNP-S, PF, 30 MCG/0.3 ML DOSE VACCINE: CPT | Mod: PBBFAC | Performed by: FAMILY MEDICINE

## 2021-01-16 RX ORDER — ALBUTEROL SULFATE 90 UG/1
AEROSOL, METERED RESPIRATORY (INHALATION)
Qty: 18 G | Refills: 2 | Status: SHIPPED | OUTPATIENT
Start: 2021-01-16 | End: 2021-04-14

## 2021-01-31 ENCOUNTER — IMMUNIZATION (OUTPATIENT)
Dept: FAMILY MEDICINE | Facility: CLINIC | Age: 76
End: 2021-01-31
Payer: MEDICARE

## 2021-01-31 DIAGNOSIS — Z23 NEED FOR VACCINATION: Primary | ICD-10-CM

## 2021-01-31 PROCEDURE — 0002A COVID-19, MRNA, LNP-S, PF, 30 MCG/0.3 ML DOSE VACCINE: CPT | Mod: PBBFAC | Performed by: INTERNAL MEDICINE

## 2021-01-31 PROCEDURE — 91300 COVID-19, MRNA, LNP-S, PF, 30 MCG/0.3 ML DOSE VACCINE: CPT | Mod: PBBFAC | Performed by: INTERNAL MEDICINE

## 2021-02-09 RX ORDER — METFORMIN HYDROCHLORIDE 500 MG/1
TABLET ORAL
Qty: 180 TABLET | Refills: 1 | Status: SHIPPED | OUTPATIENT
Start: 2021-02-09 | End: 2021-08-04

## 2021-03-23 DIAGNOSIS — I10 ESSENTIAL HYPERTENSION: ICD-10-CM

## 2021-03-23 DIAGNOSIS — N28.9 ACUTE RENAL INSUFFICIENCY: ICD-10-CM

## 2021-03-24 RX ORDER — INDAPAMIDE 1.25 MG/1
TABLET ORAL
Qty: 90 TABLET | Refills: 1 | Status: SHIPPED | OUTPATIENT
Start: 2021-03-24 | End: 2021-09-25

## 2021-04-06 ENCOUNTER — PATIENT MESSAGE (OUTPATIENT)
Dept: ADMINISTRATIVE | Facility: HOSPITAL | Age: 76
End: 2021-04-06

## 2021-04-13 ENCOUNTER — LAB VISIT (OUTPATIENT)
Dept: LAB | Facility: HOSPITAL | Age: 76
End: 2021-04-13
Attending: INTERNAL MEDICINE
Payer: MEDICARE

## 2021-04-13 DIAGNOSIS — E11.69 TYPE 2 DIABETES MELLITUS WITH OTHER SPECIFIED COMPLICATION, WITHOUT LONG-TERM CURRENT USE OF INSULIN: ICD-10-CM

## 2021-04-13 DIAGNOSIS — I10 ESSENTIAL HYPERTENSION: ICD-10-CM

## 2021-04-13 DIAGNOSIS — E03.9 HYPOTHYROIDISM, UNSPECIFIED TYPE: ICD-10-CM

## 2021-04-13 DIAGNOSIS — Z12.5 PROSTATE CANCER SCREENING: ICD-10-CM

## 2021-04-13 DIAGNOSIS — D64.9 ANEMIA, UNSPECIFIED TYPE: ICD-10-CM

## 2021-04-13 DIAGNOSIS — N18.30 CKD (CHRONIC KIDNEY DISEASE) STAGE 3, GFR 30-59 ML/MIN: ICD-10-CM

## 2021-04-13 LAB
ALBUMIN SERPL BCP-MCNC: 3.7 G/DL (ref 3.5–5.2)
ALP SERPL-CCNC: 67 U/L (ref 55–135)
ALT SERPL W/O P-5'-P-CCNC: 25 U/L (ref 10–44)
ANION GAP SERPL CALC-SCNC: 13 MMOL/L (ref 8–16)
AST SERPL-CCNC: 26 U/L (ref 10–40)
BASOPHILS # BLD AUTO: 0.08 K/UL (ref 0–0.2)
BASOPHILS NFR BLD: 0.8 % (ref 0–1.9)
BILIRUB SERPL-MCNC: 0.8 MG/DL (ref 0.1–1)
BUN SERPL-MCNC: 29 MG/DL (ref 8–23)
CALCIUM SERPL-MCNC: 9.6 MG/DL (ref 8.7–10.5)
CHLORIDE SERPL-SCNC: 99 MMOL/L (ref 95–110)
CHOLEST SERPL-MCNC: 151 MG/DL (ref 120–199)
CHOLEST/HDLC SERPL: 4.2 {RATIO} (ref 2–5)
CO2 SERPL-SCNC: 31 MMOL/L (ref 23–29)
COMPLEXED PSA SERPL-MCNC: 1.5 NG/ML (ref 0–4)
CREAT SERPL-MCNC: 1.4 MG/DL (ref 0.5–1.4)
DIFFERENTIAL METHOD: ABNORMAL
EOSINOPHIL # BLD AUTO: 0.6 K/UL (ref 0–0.5)
EOSINOPHIL NFR BLD: 6.5 % (ref 0–8)
ERYTHROCYTE [DISTWIDTH] IN BLOOD BY AUTOMATED COUNT: 13.1 % (ref 11.5–14.5)
EST. GFR  (AFRICAN AMERICAN): 56.8 ML/MIN/1.73 M^2
EST. GFR  (NON AFRICAN AMERICAN): 49.1 ML/MIN/1.73 M^2
ESTIMATED AVG GLUCOSE: 134 MG/DL (ref 68–131)
GLUCOSE SERPL-MCNC: 118 MG/DL (ref 70–110)
HBA1C MFR BLD: 6.3 % (ref 4–5.6)
HCT VFR BLD AUTO: 35.8 % (ref 40–54)
HDLC SERPL-MCNC: 36 MG/DL (ref 40–75)
HDLC SERPL: 23.8 % (ref 20–50)
HGB BLD-MCNC: 11.7 G/DL (ref 14–18)
IMM GRANULOCYTES # BLD AUTO: 0.04 K/UL (ref 0–0.04)
IMM GRANULOCYTES NFR BLD AUTO: 0.4 % (ref 0–0.5)
LDLC SERPL CALC-MCNC: 76.4 MG/DL (ref 63–159)
LYMPHOCYTES # BLD AUTO: 1.9 K/UL (ref 1–4.8)
LYMPHOCYTES NFR BLD: 19.8 % (ref 18–48)
MCH RBC QN AUTO: 32.1 PG (ref 27–31)
MCHC RBC AUTO-ENTMCNC: 32.7 G/DL (ref 32–36)
MCV RBC AUTO: 98 FL (ref 82–98)
MONOCYTES # BLD AUTO: 1 K/UL (ref 0.3–1)
MONOCYTES NFR BLD: 10.1 % (ref 4–15)
NEUTROPHILS # BLD AUTO: 6 K/UL (ref 1.8–7.7)
NEUTROPHILS NFR BLD: 62.4 % (ref 38–73)
NONHDLC SERPL-MCNC: 115 MG/DL
NRBC BLD-RTO: 0 /100 WBC
PLATELET # BLD AUTO: 227 K/UL (ref 150–450)
PMV BLD AUTO: 10.9 FL (ref 9.2–12.9)
POTASSIUM SERPL-SCNC: 4.5 MMOL/L (ref 3.5–5.1)
PROT SERPL-MCNC: 7.4 G/DL (ref 6–8.4)
RBC # BLD AUTO: 3.65 M/UL (ref 4.6–6.2)
SODIUM SERPL-SCNC: 143 MMOL/L (ref 136–145)
T3FREE SERPL-MCNC: 2.3 PG/ML (ref 2.3–4.2)
T4 FREE SERPL-MCNC: 1 NG/DL (ref 0.71–1.51)
TRIGL SERPL-MCNC: 193 MG/DL (ref 30–150)
TSH SERPL DL<=0.005 MIU/L-ACNC: 3.29 UIU/ML (ref 0.4–4)
WBC # BLD AUTO: 9.59 K/UL (ref 3.9–12.7)

## 2021-04-13 PROCEDURE — 84153 ASSAY OF PSA TOTAL: CPT | Performed by: INTERNAL MEDICINE

## 2021-04-13 PROCEDURE — 36415 COLL VENOUS BLD VENIPUNCTURE: CPT | Mod: PN | Performed by: INTERNAL MEDICINE

## 2021-04-13 PROCEDURE — 80053 COMPREHEN METABOLIC PANEL: CPT | Performed by: INTERNAL MEDICINE

## 2021-04-13 PROCEDURE — 84481 FREE ASSAY (FT-3): CPT | Performed by: INTERNAL MEDICINE

## 2021-04-13 PROCEDURE — 84443 ASSAY THYROID STIM HORMONE: CPT | Performed by: INTERNAL MEDICINE

## 2021-04-13 PROCEDURE — 80061 LIPID PANEL: CPT | Performed by: INTERNAL MEDICINE

## 2021-04-13 PROCEDURE — 85025 COMPLETE CBC W/AUTO DIFF WBC: CPT | Performed by: INTERNAL MEDICINE

## 2021-04-13 PROCEDURE — 84439 ASSAY OF FREE THYROXINE: CPT | Performed by: INTERNAL MEDICINE

## 2021-04-13 PROCEDURE — 83036 HEMOGLOBIN GLYCOSYLATED A1C: CPT | Performed by: INTERNAL MEDICINE

## 2021-04-14 RX ORDER — ALBUTEROL SULFATE 90 UG/1
AEROSOL, METERED RESPIRATORY (INHALATION)
Qty: 18 G | Refills: 2 | Status: SHIPPED | OUTPATIENT
Start: 2021-04-14 | End: 2021-07-15

## 2021-04-15 ENCOUNTER — OFFICE VISIT (OUTPATIENT)
Dept: FAMILY MEDICINE | Facility: CLINIC | Age: 76
End: 2021-04-15
Payer: MEDICARE

## 2021-04-15 VITALS
WEIGHT: 208.13 LBS | SYSTOLIC BLOOD PRESSURE: 148 MMHG | BODY MASS INDEX: 29.8 KG/M2 | OXYGEN SATURATION: 98 % | HEART RATE: 74 BPM | HEIGHT: 70 IN | DIASTOLIC BLOOD PRESSURE: 70 MMHG

## 2021-04-15 DIAGNOSIS — E78.2 MIXED HYPERLIPIDEMIA: ICD-10-CM

## 2021-04-15 DIAGNOSIS — Z01.89 ENCOUNTER FOR LABORATORY TEST: ICD-10-CM

## 2021-04-15 DIAGNOSIS — E11.69 TYPE 2 DIABETES MELLITUS WITH OTHER SPECIFIED COMPLICATION, WITHOUT LONG-TERM CURRENT USE OF INSULIN: ICD-10-CM

## 2021-04-15 DIAGNOSIS — I10 ESSENTIAL HYPERTENSION: Primary | ICD-10-CM

## 2021-04-15 DIAGNOSIS — N18.31 STAGE 3A CHRONIC KIDNEY DISEASE: ICD-10-CM

## 2021-04-15 DIAGNOSIS — E03.9 HYPOTHYROIDISM, UNSPECIFIED TYPE: ICD-10-CM

## 2021-04-15 PROCEDURE — 99214 OFFICE O/P EST MOD 30 MIN: CPT | Mod: S$GLB,,, | Performed by: INTERNAL MEDICINE

## 2021-04-15 PROCEDURE — 1159F PR MEDICATION LIST DOCUMENTED IN MEDICAL RECORD: ICD-10-PCS | Mod: S$GLB,,, | Performed by: INTERNAL MEDICINE

## 2021-04-15 PROCEDURE — 3288F PR FALLS RISK ASSESSMENT DOCUMENTED: ICD-10-PCS | Mod: CPTII,S$GLB,, | Performed by: INTERNAL MEDICINE

## 2021-04-15 PROCEDURE — 3044F HG A1C LEVEL LT 7.0%: CPT | Mod: CPTII,S$GLB,, | Performed by: INTERNAL MEDICINE

## 2021-04-15 PROCEDURE — 1101F PT FALLS ASSESS-DOCD LE1/YR: CPT | Mod: CPTII,S$GLB,, | Performed by: INTERNAL MEDICINE

## 2021-04-15 PROCEDURE — 3044F PR MOST RECENT HEMOGLOBIN A1C LEVEL <7.0%: ICD-10-PCS | Mod: CPTII,S$GLB,, | Performed by: INTERNAL MEDICINE

## 2021-04-15 PROCEDURE — 3008F BODY MASS INDEX DOCD: CPT | Mod: CPTII,S$GLB,, | Performed by: INTERNAL MEDICINE

## 2021-04-15 PROCEDURE — 3077F SYST BP >= 140 MM HG: CPT | Mod: CPTII,S$GLB,, | Performed by: INTERNAL MEDICINE

## 2021-04-15 PROCEDURE — 3288F FALL RISK ASSESSMENT DOCD: CPT | Mod: CPTII,S$GLB,, | Performed by: INTERNAL MEDICINE

## 2021-04-15 PROCEDURE — 1159F MED LIST DOCD IN RCRD: CPT | Mod: S$GLB,,, | Performed by: INTERNAL MEDICINE

## 2021-04-15 PROCEDURE — 99999 PR PBB SHADOW E&M-EST. PATIENT-LVL V: ICD-10-PCS | Mod: PBBFAC,,, | Performed by: INTERNAL MEDICINE

## 2021-04-15 PROCEDURE — 3077F PR MOST RECENT SYSTOLIC BLOOD PRESSURE >= 140 MM HG: ICD-10-PCS | Mod: CPTII,S$GLB,, | Performed by: INTERNAL MEDICINE

## 2021-04-15 PROCEDURE — 3078F PR MOST RECENT DIASTOLIC BLOOD PRESSURE < 80 MM HG: ICD-10-PCS | Mod: CPTII,S$GLB,, | Performed by: INTERNAL MEDICINE

## 2021-04-15 PROCEDURE — 99214 PR OFFICE/OUTPT VISIT, EST, LEVL IV, 30-39 MIN: ICD-10-PCS | Mod: S$GLB,,, | Performed by: INTERNAL MEDICINE

## 2021-04-15 PROCEDURE — 3008F PR BODY MASS INDEX (BMI) DOCUMENTED: ICD-10-PCS | Mod: CPTII,S$GLB,, | Performed by: INTERNAL MEDICINE

## 2021-04-15 PROCEDURE — 99999 PR PBB SHADOW E&M-EST. PATIENT-LVL V: CPT | Mod: PBBFAC,,, | Performed by: INTERNAL MEDICINE

## 2021-04-15 PROCEDURE — 1101F PR PT FALLS ASSESS DOC 0-1 FALLS W/OUT INJ PAST YR: ICD-10-PCS | Mod: CPTII,S$GLB,, | Performed by: INTERNAL MEDICINE

## 2021-04-15 PROCEDURE — 3078F DIAST BP <80 MM HG: CPT | Mod: CPTII,S$GLB,, | Performed by: INTERNAL MEDICINE

## 2021-05-26 ENCOUNTER — PES CALL (OUTPATIENT)
Dept: ADMINISTRATIVE | Facility: CLINIC | Age: 76
End: 2021-05-26

## 2021-07-07 ENCOUNTER — PATIENT MESSAGE (OUTPATIENT)
Dept: ADMINISTRATIVE | Facility: HOSPITAL | Age: 76
End: 2021-07-07

## 2021-07-08 DIAGNOSIS — E11.9 TYPE 2 DIABETES MELLITUS WITHOUT COMPLICATION: ICD-10-CM

## 2021-07-08 DIAGNOSIS — E11.9 TYPE 2 DIABETES MELLITUS WITHOUT COMPLICATION, UNSPECIFIED WHETHER LONG TERM INSULIN USE: ICD-10-CM

## 2021-07-15 RX ORDER — ALBUTEROL SULFATE 90 UG/1
AEROSOL, METERED RESPIRATORY (INHALATION)
Qty: 18 G | Refills: 2 | Status: SHIPPED | OUTPATIENT
Start: 2021-07-15 | End: 2021-10-11

## 2021-07-28 ENCOUNTER — PATIENT MESSAGE (OUTPATIENT)
Dept: FAMILY MEDICINE | Facility: CLINIC | Age: 76
End: 2021-07-28

## 2021-07-28 ENCOUNTER — TELEPHONE (OUTPATIENT)
Dept: FAMILY MEDICINE | Facility: CLINIC | Age: 76
End: 2021-07-28

## 2021-08-04 DIAGNOSIS — E78.2 MIXED HYPERLIPIDEMIA: ICD-10-CM

## 2021-08-04 DIAGNOSIS — Z95.5 S/P CORONARY ARTERY STENT PLACEMENT: ICD-10-CM

## 2021-08-04 RX ORDER — ATORVASTATIN CALCIUM 40 MG/1
TABLET, FILM COATED ORAL
Qty: 90 TABLET | Refills: 1 | Status: SHIPPED | OUTPATIENT
Start: 2021-08-04 | End: 2022-01-31

## 2021-08-04 RX ORDER — METFORMIN HYDROCHLORIDE 500 MG/1
TABLET ORAL
Qty: 180 TABLET | Refills: 1 | Status: SHIPPED | OUTPATIENT
Start: 2021-08-04 | End: 2022-03-31

## 2021-08-06 ENCOUNTER — LAB VISIT (OUTPATIENT)
Dept: LAB | Facility: HOSPITAL | Age: 76
End: 2021-08-06
Payer: MEDICARE

## 2021-08-06 DIAGNOSIS — R59.0 MEDIASTINAL ADENOPATHY: ICD-10-CM

## 2021-08-06 DIAGNOSIS — J18.9 RECURRENT PNEUMONIA: ICD-10-CM

## 2021-08-06 LAB
CREAT SERPL-MCNC: 1.5 MG/DL (ref 0.5–1.4)
EST. GFR  (AFRICAN AMERICAN): 52.3 ML/MIN/1.73 M^2
EST. GFR  (NON AFRICAN AMERICAN): 45.2 ML/MIN/1.73 M^2
IGG SERPL-MCNC: 892 MG/DL (ref 650–1600)

## 2021-08-06 PROCEDURE — 82784 ASSAY IGA/IGD/IGG/IGM EACH: CPT | Performed by: NURSE PRACTITIONER

## 2021-08-06 PROCEDURE — 36415 COLL VENOUS BLD VENIPUNCTURE: CPT | Mod: PN | Performed by: NURSE PRACTITIONER

## 2021-08-06 PROCEDURE — 82565 ASSAY OF CREATININE: CPT | Performed by: NURSE PRACTITIONER

## 2021-08-06 PROCEDURE — 82787 IGG 1 2 3 OR 4 EACH: CPT | Mod: 59 | Performed by: NURSE PRACTITIONER

## 2021-08-09 LAB
IGG1 SER-MCNC: 455 MG/DL (ref 382–929)
IGG2 SER-MCNC: 277 MG/DL (ref 242–700)
IGG3 SER-MCNC: 40 MG/DL (ref 22–176)
IGG4 SER-MCNC: 40 MG/DL (ref 4–86)

## 2021-08-10 ENCOUNTER — PATIENT MESSAGE (OUTPATIENT)
Dept: FAMILY MEDICINE | Facility: CLINIC | Age: 76
End: 2021-08-10

## 2021-09-23 ENCOUNTER — OFFICE VISIT (OUTPATIENT)
Dept: FAMILY MEDICINE | Facility: CLINIC | Age: 76
End: 2021-09-23
Payer: MEDICARE

## 2021-09-23 VITALS
HEART RATE: 74 BPM | SYSTOLIC BLOOD PRESSURE: 156 MMHG | HEIGHT: 70 IN | DIASTOLIC BLOOD PRESSURE: 72 MMHG | BODY MASS INDEX: 29.37 KG/M2 | WEIGHT: 205.13 LBS | RESPIRATION RATE: 18 BRPM | OXYGEN SATURATION: 95 %

## 2021-09-23 DIAGNOSIS — Z86.79 HISTORY OF VALVULAR HEART DISEASE: ICD-10-CM

## 2021-09-23 DIAGNOSIS — R06.09 DOE (DYSPNEA ON EXERTION): ICD-10-CM

## 2021-09-23 DIAGNOSIS — E11.69 TYPE 2 DIABETES MELLITUS WITH OTHER SPECIFIED COMPLICATION, WITHOUT LONG-TERM CURRENT USE OF INSULIN: ICD-10-CM

## 2021-09-23 DIAGNOSIS — E83.52 HYPERCALCEMIA: ICD-10-CM

## 2021-09-23 DIAGNOSIS — R59.0 MEDIASTINAL LYMPHADENOPATHY: ICD-10-CM

## 2021-09-23 DIAGNOSIS — Z87.01 H/O RECURRENT PNEUMONIA: ICD-10-CM

## 2021-09-23 DIAGNOSIS — E78.2 MIXED HYPERLIPIDEMIA: ICD-10-CM

## 2021-09-23 DIAGNOSIS — E03.9 HYPOTHYROIDISM, UNSPECIFIED TYPE: ICD-10-CM

## 2021-09-23 DIAGNOSIS — I10 ESSENTIAL HYPERTENSION: Primary | ICD-10-CM

## 2021-09-23 DIAGNOSIS — J45.901 PERSISTENT ASTHMA WITH ACUTE EXACERBATION, UNSPECIFIED ASTHMA SEVERITY: ICD-10-CM

## 2021-09-23 DIAGNOSIS — J30.9 CHRONIC ALLERGIC RHINITIS: ICD-10-CM

## 2021-09-23 DIAGNOSIS — R74.01 TRANSAMINITIS: ICD-10-CM

## 2021-09-23 DIAGNOSIS — D64.9 ANEMIA, UNSPECIFIED TYPE: ICD-10-CM

## 2021-09-23 DIAGNOSIS — G47.33 OSA (OBSTRUCTIVE SLEEP APNEA): ICD-10-CM

## 2021-09-23 DIAGNOSIS — Z95.5 S/P CORONARY ARTERY STENT PLACEMENT: ICD-10-CM

## 2021-09-23 DIAGNOSIS — Z79.01 LONG TERM (CURRENT) USE OF ANTICOAGULANTS: ICD-10-CM

## 2021-09-23 DIAGNOSIS — E66.3 OVERWEIGHT (BMI 25.0-29.9): ICD-10-CM

## 2021-09-23 DIAGNOSIS — N18.31 STAGE 3A CHRONIC KIDNEY DISEASE: ICD-10-CM

## 2021-09-23 DIAGNOSIS — I48.0 PAROXYSMAL ATRIAL FIBRILLATION: ICD-10-CM

## 2021-09-23 PROCEDURE — 3066F PR DOCUMENTATION OF TREATMENT FOR NEPHROPATHY: ICD-10-PCS | Mod: CPTII,S$GLB,, | Performed by: INTERNAL MEDICINE

## 2021-09-23 PROCEDURE — 3044F HG A1C LEVEL LT 7.0%: CPT | Mod: CPTII,S$GLB,, | Performed by: INTERNAL MEDICINE

## 2021-09-23 PROCEDURE — 99999 PR PBB SHADOW E&M-EST. PATIENT-LVL V: CPT | Mod: PBBFAC,,, | Performed by: INTERNAL MEDICINE

## 2021-09-23 PROCEDURE — 3044F PR MOST RECENT HEMOGLOBIN A1C LEVEL <7.0%: ICD-10-PCS | Mod: CPTII,S$GLB,, | Performed by: INTERNAL MEDICINE

## 2021-09-23 PROCEDURE — 3078F DIAST BP <80 MM HG: CPT | Mod: CPTII,S$GLB,, | Performed by: INTERNAL MEDICINE

## 2021-09-23 PROCEDURE — 3061F PR NEG MICROALBUMINURIA RESULT DOCUMENTED/REVIEW: ICD-10-PCS | Mod: CPTII,S$GLB,, | Performed by: INTERNAL MEDICINE

## 2021-09-23 PROCEDURE — 1101F PT FALLS ASSESS-DOCD LE1/YR: CPT | Mod: CPTII,S$GLB,, | Performed by: INTERNAL MEDICINE

## 2021-09-23 PROCEDURE — 99499 RISK ADDL DX/OHS AUDIT: ICD-10-PCS | Mod: HCNC,S$GLB,, | Performed by: INTERNAL MEDICINE

## 2021-09-23 PROCEDURE — 3008F PR BODY MASS INDEX (BMI) DOCUMENTED: ICD-10-PCS | Mod: CPTII,S$GLB,, | Performed by: INTERNAL MEDICINE

## 2021-09-23 PROCEDURE — 1159F MED LIST DOCD IN RCRD: CPT | Mod: CPTII,S$GLB,, | Performed by: INTERNAL MEDICINE

## 2021-09-23 PROCEDURE — 3288F PR FALLS RISK ASSESSMENT DOCUMENTED: ICD-10-PCS | Mod: CPTII,S$GLB,, | Performed by: INTERNAL MEDICINE

## 2021-09-23 PROCEDURE — 1125F PR PAIN SEVERITY QUANTIFIED, PAIN PRESENT: ICD-10-PCS | Mod: CPTII,S$GLB,, | Performed by: INTERNAL MEDICINE

## 2021-09-23 PROCEDURE — 3078F PR MOST RECENT DIASTOLIC BLOOD PRESSURE < 80 MM HG: ICD-10-PCS | Mod: CPTII,S$GLB,, | Performed by: INTERNAL MEDICINE

## 2021-09-23 PROCEDURE — 4010F ACE/ARB THERAPY RXD/TAKEN: CPT | Mod: CPTII,S$GLB,, | Performed by: INTERNAL MEDICINE

## 2021-09-23 PROCEDURE — 99499 UNLISTED E&M SERVICE: CPT | Mod: HCNC,S$GLB,, | Performed by: INTERNAL MEDICINE

## 2021-09-23 PROCEDURE — 4010F PR ACE/ARB THEARPY RXD/TAKEN: ICD-10-PCS | Mod: CPTII,S$GLB,, | Performed by: INTERNAL MEDICINE

## 2021-09-23 PROCEDURE — 3077F PR MOST RECENT SYSTOLIC BLOOD PRESSURE >= 140 MM HG: ICD-10-PCS | Mod: CPTII,S$GLB,, | Performed by: INTERNAL MEDICINE

## 2021-09-23 PROCEDURE — 1159F PR MEDICATION LIST DOCUMENTED IN MEDICAL RECORD: ICD-10-PCS | Mod: CPTII,S$GLB,, | Performed by: INTERNAL MEDICINE

## 2021-09-23 PROCEDURE — 3008F BODY MASS INDEX DOCD: CPT | Mod: CPTII,S$GLB,, | Performed by: INTERNAL MEDICINE

## 2021-09-23 PROCEDURE — 99215 PR OFFICE/OUTPT VISIT, EST, LEVL V, 40-54 MIN: ICD-10-PCS | Mod: S$GLB,,, | Performed by: INTERNAL MEDICINE

## 2021-09-23 PROCEDURE — 1101F PR PT FALLS ASSESS DOC 0-1 FALLS W/OUT INJ PAST YR: ICD-10-PCS | Mod: CPTII,S$GLB,, | Performed by: INTERNAL MEDICINE

## 2021-09-23 PROCEDURE — 1160F PR REVIEW ALL MEDS BY PRESCRIBER/CLIN PHARMACIST DOCUMENTED: ICD-10-PCS | Mod: CPTII,S$GLB,, | Performed by: INTERNAL MEDICINE

## 2021-09-23 PROCEDURE — 3061F NEG MICROALBUMINURIA REV: CPT | Mod: CPTII,S$GLB,, | Performed by: INTERNAL MEDICINE

## 2021-09-23 PROCEDURE — 3066F NEPHROPATHY DOC TX: CPT | Mod: CPTII,S$GLB,, | Performed by: INTERNAL MEDICINE

## 2021-09-23 PROCEDURE — 1160F RVW MEDS BY RX/DR IN RCRD: CPT | Mod: CPTII,S$GLB,, | Performed by: INTERNAL MEDICINE

## 2021-09-23 PROCEDURE — 3077F SYST BP >= 140 MM HG: CPT | Mod: CPTII,S$GLB,, | Performed by: INTERNAL MEDICINE

## 2021-09-23 PROCEDURE — 1125F AMNT PAIN NOTED PAIN PRSNT: CPT | Mod: CPTII,S$GLB,, | Performed by: INTERNAL MEDICINE

## 2021-09-23 PROCEDURE — 99999 PR PBB SHADOW E&M-EST. PATIENT-LVL V: ICD-10-PCS | Mod: PBBFAC,,, | Performed by: INTERNAL MEDICINE

## 2021-09-23 PROCEDURE — 3288F FALL RISK ASSESSMENT DOCD: CPT | Mod: CPTII,S$GLB,, | Performed by: INTERNAL MEDICINE

## 2021-09-23 PROCEDURE — 99215 OFFICE O/P EST HI 40 MIN: CPT | Mod: S$GLB,,, | Performed by: INTERNAL MEDICINE

## 2021-09-23 RX ORDER — BUDESONIDE AND FORMOTEROL FUMARATE DIHYDRATE 80; 4.5 UG/1; UG/1
AEROSOL RESPIRATORY (INHALATION)
Qty: 10.2 G | Refills: 2 | Status: SHIPPED | OUTPATIENT
Start: 2021-09-23 | End: 2023-06-27

## 2021-09-23 RX ORDER — PREDNISONE 20 MG/1
TABLET ORAL
Qty: 5 TABLET | Refills: 0 | Status: SHIPPED | OUTPATIENT
Start: 2021-09-23 | End: 2022-01-17

## 2021-09-24 DIAGNOSIS — I10 ESSENTIAL HYPERTENSION: ICD-10-CM

## 2021-09-24 DIAGNOSIS — N28.9 ACUTE RENAL INSUFFICIENCY: ICD-10-CM

## 2021-09-25 RX ORDER — INDAPAMIDE 1.25 MG/1
TABLET ORAL
Qty: 90 TABLET | Refills: 1 | Status: SHIPPED | OUTPATIENT
Start: 2021-09-25 | End: 2022-03-13

## 2021-10-11 RX ORDER — ALBUTEROL SULFATE 90 UG/1
AEROSOL, METERED RESPIRATORY (INHALATION)
Qty: 18 G | Refills: 2 | Status: SHIPPED | OUTPATIENT
Start: 2021-10-11 | End: 2022-01-05

## 2021-10-14 ENCOUNTER — TELEPHONE (OUTPATIENT)
Dept: FAMILY MEDICINE | Facility: CLINIC | Age: 76
End: 2021-10-14

## 2021-10-14 DIAGNOSIS — E83.52 HYPERCALCEMIA: Primary | ICD-10-CM

## 2021-10-15 ENCOUNTER — PATIENT MESSAGE (OUTPATIENT)
Dept: FAMILY MEDICINE | Facility: CLINIC | Age: 76
End: 2021-10-15
Payer: MEDICARE

## 2021-11-18 ENCOUNTER — LAB VISIT (OUTPATIENT)
Dept: LAB | Facility: HOSPITAL | Age: 76
End: 2021-11-18
Attending: INTERNAL MEDICINE
Payer: MEDICARE

## 2021-11-18 DIAGNOSIS — E78.2 MIXED HYPERLIPIDEMIA: ICD-10-CM

## 2021-11-18 DIAGNOSIS — I10 ESSENTIAL HYPERTENSION: ICD-10-CM

## 2021-11-18 DIAGNOSIS — N18.31 STAGE 3A CHRONIC KIDNEY DISEASE: ICD-10-CM

## 2021-11-18 DIAGNOSIS — D64.9 ANEMIA, UNSPECIFIED TYPE: ICD-10-CM

## 2021-11-18 DIAGNOSIS — E03.9 HYPOTHYROIDISM, UNSPECIFIED TYPE: ICD-10-CM

## 2021-11-18 DIAGNOSIS — E83.52 HYPERCALCEMIA: ICD-10-CM

## 2021-11-18 DIAGNOSIS — R74.01 TRANSAMINITIS: ICD-10-CM

## 2021-11-18 DIAGNOSIS — E11.69 TYPE 2 DIABETES MELLITUS WITH OTHER SPECIFIED COMPLICATION, WITHOUT LONG-TERM CURRENT USE OF INSULIN: ICD-10-CM

## 2021-11-18 LAB
25(OH)D3+25(OH)D2 SERPL-MCNC: 39 NG/ML (ref 30–96)
ALBUMIN SERPL BCP-MCNC: 3.6 G/DL (ref 3.5–5.2)
ALP SERPL-CCNC: 68 U/L (ref 55–135)
ALT SERPL W/O P-5'-P-CCNC: 25 U/L (ref 10–44)
ANION GAP SERPL CALC-SCNC: 10 MMOL/L (ref 8–16)
AST SERPL-CCNC: 22 U/L (ref 10–40)
BASOPHILS # BLD AUTO: 0.06 K/UL (ref 0–0.2)
BASOPHILS NFR BLD: 0.6 % (ref 0–1.9)
BILIRUB SERPL-MCNC: 0.5 MG/DL (ref 0.1–1)
BUN SERPL-MCNC: 25 MG/DL (ref 8–23)
CALCIUM SERPL-MCNC: 10.2 MG/DL (ref 8.7–10.5)
CHLORIDE SERPL-SCNC: 102 MMOL/L (ref 95–110)
CO2 SERPL-SCNC: 30 MMOL/L (ref 23–29)
CREAT SERPL-MCNC: 1.3 MG/DL (ref 0.5–1.4)
DIFFERENTIAL METHOD: ABNORMAL
EOSINOPHIL # BLD AUTO: 0.5 K/UL (ref 0–0.5)
EOSINOPHIL NFR BLD: 5.3 % (ref 0–8)
ERYTHROCYTE [DISTWIDTH] IN BLOOD BY AUTOMATED COUNT: 12.3 % (ref 11.5–14.5)
EST. GFR  (AFRICAN AMERICAN): >60 ML/MIN/1.73 M^2
EST. GFR  (NON AFRICAN AMERICAN): 53.8 ML/MIN/1.73 M^2
ESTIMATED AVG GLUCOSE: 126 MG/DL (ref 68–131)
FERRITIN SERPL-MCNC: 477 NG/ML (ref 20–300)
GLUCOSE SERPL-MCNC: 119 MG/DL (ref 70–110)
HBA1C MFR BLD: 6 % (ref 4–5.6)
HCT VFR BLD AUTO: 35.2 % (ref 40–54)
HGB BLD-MCNC: 11.3 G/DL (ref 14–18)
IMM GRANULOCYTES # BLD AUTO: 0.04 K/UL (ref 0–0.04)
IMM GRANULOCYTES NFR BLD AUTO: 0.4 % (ref 0–0.5)
IRON SERPL-MCNC: 40 UG/DL (ref 45–160)
LYMPHOCYTES # BLD AUTO: 1.9 K/UL (ref 1–4.8)
LYMPHOCYTES NFR BLD: 19.8 % (ref 18–48)
MCH RBC QN AUTO: 31.2 PG (ref 27–31)
MCHC RBC AUTO-ENTMCNC: 32.1 G/DL (ref 32–36)
MCV RBC AUTO: 97 FL (ref 82–98)
MONOCYTES # BLD AUTO: 0.8 K/UL (ref 0.3–1)
MONOCYTES NFR BLD: 8.6 % (ref 4–15)
NEUTROPHILS # BLD AUTO: 6.3 K/UL (ref 1.8–7.7)
NEUTROPHILS NFR BLD: 65.3 % (ref 38–73)
NRBC BLD-RTO: 0 /100 WBC
PLATELET # BLD AUTO: 257 K/UL (ref 150–450)
PMV BLD AUTO: 10.6 FL (ref 9.2–12.9)
POTASSIUM SERPL-SCNC: 4.3 MMOL/L (ref 3.5–5.1)
PROT SERPL-MCNC: 7.3 G/DL (ref 6–8.4)
PTH-INTACT SERPL-MCNC: 24.7 PG/ML (ref 9–77)
RBC # BLD AUTO: 3.62 M/UL (ref 4.6–6.2)
SATURATED IRON: 13 % (ref 20–50)
SODIUM SERPL-SCNC: 142 MMOL/L (ref 136–145)
T3FREE SERPL-MCNC: 2.2 PG/ML (ref 2.3–4.2)
T4 FREE SERPL-MCNC: 0.97 NG/DL (ref 0.71–1.51)
TOTAL IRON BINDING CAPACITY: 315 UG/DL (ref 250–450)
TRANSFERRIN SERPL-MCNC: 213 MG/DL (ref 200–375)
TSH SERPL DL<=0.005 MIU/L-ACNC: 2.52 UIU/ML (ref 0.4–4)
WBC # BLD AUTO: 9.6 K/UL (ref 3.9–12.7)

## 2021-11-18 PROCEDURE — 84466 ASSAY OF TRANSFERRIN: CPT | Mod: HCNC | Performed by: INTERNAL MEDICINE

## 2021-11-18 PROCEDURE — 82728 ASSAY OF FERRITIN: CPT | Mod: HCNC | Performed by: INTERNAL MEDICINE

## 2021-11-18 PROCEDURE — 84481 FREE ASSAY (FT-3): CPT | Mod: HCNC | Performed by: INTERNAL MEDICINE

## 2021-11-18 PROCEDURE — 85025 COMPLETE CBC W/AUTO DIFF WBC: CPT | Mod: HCNC | Performed by: INTERNAL MEDICINE

## 2021-11-18 PROCEDURE — 84443 ASSAY THYROID STIM HORMONE: CPT | Mod: HCNC | Performed by: INTERNAL MEDICINE

## 2021-11-18 PROCEDURE — 84439 ASSAY OF FREE THYROXINE: CPT | Mod: HCNC | Performed by: INTERNAL MEDICINE

## 2021-11-18 PROCEDURE — 83036 HEMOGLOBIN GLYCOSYLATED A1C: CPT | Mod: HCNC | Performed by: INTERNAL MEDICINE

## 2021-11-18 PROCEDURE — 82306 VITAMIN D 25 HYDROXY: CPT | Mod: HCNC | Performed by: INTERNAL MEDICINE

## 2021-11-18 PROCEDURE — 80053 COMPREHEN METABOLIC PANEL: CPT | Mod: HCNC | Performed by: INTERNAL MEDICINE

## 2021-11-18 PROCEDURE — 83970 ASSAY OF PARATHORMONE: CPT | Mod: HCNC | Performed by: INTERNAL MEDICINE

## 2021-11-18 PROCEDURE — 36415 COLL VENOUS BLD VENIPUNCTURE: CPT | Mod: HCNC,PN | Performed by: INTERNAL MEDICINE

## 2021-12-03 ENCOUNTER — PATIENT OUTREACH (OUTPATIENT)
Dept: ADMINISTRATIVE | Facility: HOSPITAL | Age: 76
End: 2021-12-03
Payer: MEDICARE

## 2021-12-10 ENCOUNTER — PATIENT OUTREACH (OUTPATIENT)
Dept: ADMINISTRATIVE | Facility: HOSPITAL | Age: 76
End: 2021-12-10
Payer: MEDICARE

## 2021-12-15 ENCOUNTER — PATIENT OUTREACH (OUTPATIENT)
Dept: ADMINISTRATIVE | Facility: HOSPITAL | Age: 76
End: 2021-12-15
Payer: MEDICARE

## 2022-01-03 ENCOUNTER — PATIENT MESSAGE (OUTPATIENT)
Dept: FAMILY MEDICINE | Facility: CLINIC | Age: 77
End: 2022-01-03
Payer: MEDICARE

## 2022-01-04 ENCOUNTER — OFFICE VISIT (OUTPATIENT)
Dept: FAMILY MEDICINE | Facility: CLINIC | Age: 77
End: 2022-01-04
Payer: MEDICARE

## 2022-01-04 DIAGNOSIS — E78.5 DYSLIPIDEMIA: ICD-10-CM

## 2022-01-04 DIAGNOSIS — E78.2 MIXED HYPERLIPIDEMIA: ICD-10-CM

## 2022-01-04 DIAGNOSIS — I38 VALVULAR HEART DISEASE: ICD-10-CM

## 2022-01-04 DIAGNOSIS — N18.31 STAGE 3A CHRONIC KIDNEY DISEASE: ICD-10-CM

## 2022-01-04 DIAGNOSIS — E11.69 TYPE 2 DIABETES MELLITUS WITH OTHER SPECIFIED COMPLICATION, WITHOUT LONG-TERM CURRENT USE OF INSULIN: ICD-10-CM

## 2022-01-04 DIAGNOSIS — D63.8 ANEMIA OF CHRONIC DISEASE: ICD-10-CM

## 2022-01-04 DIAGNOSIS — I10 ESSENTIAL HYPERTENSION: ICD-10-CM

## 2022-01-04 DIAGNOSIS — E03.9 HYPOTHYROIDISM, UNSPECIFIED TYPE: Primary | ICD-10-CM

## 2022-01-04 DIAGNOSIS — I48.91 ATRIAL FIBRILLATION WITH RVR: ICD-10-CM

## 2022-01-04 DIAGNOSIS — Z95.5 S/P CORONARY ARTERY STENT PLACEMENT: ICD-10-CM

## 2022-01-04 PROCEDURE — 99443 PR PHYSICIAN TELEPHONE EVALUATION 21-30 MIN: CPT | Mod: HCNC,95,, | Performed by: INTERNAL MEDICINE

## 2022-01-04 PROCEDURE — 99443 PR PHYSICIAN TELEPHONE EVALUATION 21-30 MIN: ICD-10-PCS | Mod: HCNC,95,, | Performed by: INTERNAL MEDICINE

## 2022-01-04 RX ORDER — LANCETS
EACH MISCELLANEOUS
Qty: 100 EACH | Refills: 3 | Status: SHIPPED | OUTPATIENT
Start: 2022-01-04

## 2022-01-04 RX ORDER — LEVOTHYROXINE SODIUM 75 UG/1
75 TABLET ORAL
Qty: 90 TABLET | Refills: 1 | Status: SHIPPED | OUTPATIENT
Start: 2022-01-04 | End: 2022-05-19

## 2022-01-04 NOTE — PROGRESS NOTES
Subjective:      Audio Only Telehealth Visit     The patient location is:  Home  The chief complaint leading to consultation is:  Reassessment for multiple diagnosis  Visit type: Virtual visit with audio only (telephone)  Total time spent with patient:  4:37 p.m. through 5:14 p.m..; 37 minutes on the phone another 15 minutes spent on supplemental documentation and review afterwards     The reason for the audio only service rather than synchronous audio and video virtual visit was related to technical difficulties or patient preference/necessity.     Each patient to whom I provide medical services by telemedicine is:  (1) informed of the relationship between the physician and patient and the respective role of any other health care provider with respect to management of the patient; and (2) notified that they may decline to receive medical services by telemedicine and may withdraw from such care at any time. Patient verbally consented to receive this service via voice-only telephone call.     HPI:  Please see below   Assessment and plan:  Please see below      This service was not originating from a related E/M service provided within the previous 7 days nor will  to an E/M service or procedure within the next 24 hours or my soonest available appointment.  Prevailing standard of care was able to be met in this audio-only visit.       Patient ID: Jadiel Rossi is a 76 y.o. male.    Chief Complaint:  Reassessment    HPI:  Visit was carried out as an or audio only due to virtual difficulty with audiovisual.  Hypothyroidism, unspecified type:  Based on patient's weight and slightly reduced free T3 will increase levothyroxine from 62.5 mcg to 75 mcg daily.  This could also help his cholesterol numbers as well as his weight  Type 2 diabetes mellitus with other specified complication, without long-term current use of insulin:  Blood sugars controlled between 110-126 at home with fasting blood sugar 119; A1c has  improved from 6.3 to 6.0.  Mixed hyperlipidemia:  Last 2 orders placed by me for lipid profile unfortunately were not performed by phlebotomy; have been reordered for follow-up.  Low-fat high-fiber diet with continue atorvastatin 40 mg daily.  Dyslipidemia:  Increase aerobic activity and adherence to low-fat high-fiber diet  S/P coronary artery stent placement:  Has been stable patient sees his cardiologist Dr. Ward.  Last Wednesday he had an LAD coronary stent placed and reportedly did fine to have with sounds like an RCA stent placed at a later date pending.  Cardiology has discontinued furosemide which has helped his GFR patient is no longer with lower extremity edema  Essential hypertension:  Maintain less than 2 g sodium diet and exercise regularly with continuing to monitor his blood pressures at home and keeping a log for office review  CKD stage IIIA:  GFR has improved further to 53.8 from 45.2.  Patient now off furosemide by cardiology's moved Dr. Ward.  Patient no longer with lower extremity edema better.  Valvular heart disease' has been stable  Atrial fibrillation with RVR:  On Eliquis for anticoagulation per Cardiology; also on sotalol.  Managed by Cardiology Dr. Ward  Anemia of chronic disease; continue to monitor conservatively resume men's 50+ multivitamin 1 daily ferritin level slightly elevated at 477 with TIBC low normal.  Normocytic anemia with hemoglobin 11.3  -     CBC Auto Differential; Future; Expected date: 01/04/2022  Total time 4:37 p.m. to 5:14 p.m..  Greater than 50% of time spent in discussion, counseling, and review    Review of Systems   Constitutional: Negative for activity change and unexpected weight change.   HENT: Negative for hearing loss, rhinorrhea and trouble swallowing.    Eyes: Negative for discharge and visual disturbance.   Respiratory: Negative for chest tightness and wheezing.    Cardiovascular: Negative for chest pain and palpitations.   Gastrointestinal: Negative  for blood in stool, constipation, diarrhea and vomiting.   Endocrine: Negative for polydipsia and polyuria.   Genitourinary: Negative for difficulty urinating, hematuria and urgency.   Musculoskeletal: Negative for arthralgias, joint swelling and neck pain.   Neurological: Negative for weakness and headaches.   Psychiatric/Behavioral: Negative for confusion and dysphoric mood. The patient is not nervous/anxious.       Objective:        There were no vitals filed for this visit.    BMI Readings from Last 3 Encounters:   09/23/21 29.43 kg/m²   06/02/21 30.53 kg/m²   04/19/21 29.59 kg/m²        Wt Readings from Last 3 Encounters:   09/23/21 1601 93.1 kg (205 lb 2.2 oz)   06/02/21 1310 96.5 kg (212 lb 12.8 oz)   04/19/21 1312 93.5 kg (206 lb 3.2 oz)        BP Readings from Last 3 Encounters:   09/23/21 (!) 156/72   06/02/21 138/70   04/19/21 (!) 158/72        There are no preventive care reminders to display for this patient.     Health Maintenance Due   Topic Date Due    Shingles Vaccine (1 of 2) Never done    Pneumococcal Vaccines (Age 65+) (1 of 2 - PPSV23) 11/05/2018    Eye Exam  05/25/2019    Foot Exam  09/10/2019    Influenza Vaccine (1) 09/01/2021         Past Medical History:   Diagnosis Date    Anemia     Anxiety     Atrial fibrillation     ad[facility]/18 for AF w RVR; no cardioversion needed; Dr Mendes EPS. Slowed down on its own and back to NSR; placed on eliquis.     Carotid artery plaque     bilateral    Diabetes mellitus, type 2     Essential hypertension     Hypertension     Hypothyroidism     Liver dysfunction     Methylenetetrahydrofolate reductase (MTHFR) gene mutation     Mixed hyperlipidemia     Obesity     ROBBY (obstructive sleep apnea)     Proteinuria     Valvular heart disease        Past Surgical History:   Procedure Laterality Date    APPENDECTOMY  [id number]    COLONOSCOPY N/A 5/1/2019    Procedure: COLONOSCOPY;  Surgeon: Jimenez Solomon Jr., MD;  Location: [facility]  ENDO;  Service: Endoscopy;  Laterality: N/A;    CORONARY STENT PLACEMENT  2017    LAD by Dr Ward    ESOPHAGOGASTRODUODENOSCOPY N/A 2019    Procedure: EGD (ESOPHAGOGASTRODUODENOSCOPY);  Surgeon: Jimenez Solomon Jr., MD;  Location: [facility] ENDO;  Service: Endoscopy;  Laterality: N/A;       Social History     Tobacco Use    Smoking status: Never Smoker    Smokeless tobacco: Never Used   Substance Use Topics    Alcohol use: Yes     Alcohol/week: 16.0 standard drinks     Types: 8 Glasses of wine, 8 Shots of liquor per week    Drug use: No       Family History   Problem Relation Age of Onset    Cancer Mother     Heart disease Mother     Heart disease Father     Arthritis Father     Hypertension Father     Cancer Sister         breast cancer    Diabetes Sister     Asthma Brother     COPD Brother     Diabetes Brother     Stroke Brother     Cancer Sister         leukemia    Early death Sister          at 64 of leukemia    Colon cancer Neg Hx     Crohn's disease Neg Hx     Esophageal cancer Neg Hx     Ulcerative colitis Neg Hx     Stomach cancer Neg Hx        Review of patient's allergies indicates:   Allergen Reactions    Multaq [dronedarone]        Current Outpatient Medications on File Prior to Visit   Medication Sig Dispense Refill    albuterol-ipratropium (DUO-NEB) 2.5 mg-0.5 mg/3 mL nebulizer solution Take 3 mLs by nebulization every 6 (six) hours as needed for Wheezing. Rescue 120 vial 3    atorvastatin (LIPITOR) 40 MG tablet TAKE 1 TABLET(40 MG) BY MOUTH EVERY DAY 90 tablet 1    budesonide (PULMICORT) 0.5 mg/2 mL nebulizer solution Take 2 mLs (0.5 mg total) by nebulization 2 (two) times daily. Controller 120 mL 3    budesonide-formoterol 80-4.5 mcg (SYMBICORT) 80-4.5 mcg/actuation HFAA Controller; 2 puffs 2x a day; please provide generic. 10.2 g 2    ELIQUIS 5 mg Tab Take 1 tablet (5 mg total) by mouth 2 (two) times daily. 60 tablet 3    indapamide (LOZOL) 1.25 MG Tab TAKE 1  TABLET(1.25 MG) BY MOUTH EVERY DAY 90 tablet 1    losartan (COZAAR) 25 MG tablet TAKE 1 TABLET(25 MG) BY MOUTH EVERY DAY 90 tablet 1    metFORMIN (GLUCOPHAGE) 500 MG tablet TAKE 1 TABLET BY MOUTH TWICE DAILY 180 tablet 1    multivitamin (ONE DAILY MULTIVITAMIN) per tablet Take 1 tablet by mouth once daily.      nitroGLYCERIN (NITROSTAT) 0.4 MG SL tablet DISSOLVE ONE T SUBLINGUALLY Q 5 MINUTES X 3 DOSES PRN FOR CHEST PAIN  0    sotaloL (BETAPACE) 80 MG tablet       SPIRIVA WITH HANDIHALER 18 mcg inhalation capsule as needed.       [DISCONTINUED] acetaminophen (TYLENOL) 325 MG tablet Take 2 tablets (650 mg total) by mouth every 6 (six) hours as needed for Pain (or equal to 101 degree F).  0    [DISCONTINUED] predniSONE (DELTASONE) 20 MG tablet 20 mg po daily for 5 days. 5 tablet 0     No current facility-administered medications on file prior to visit.       Physical Exam  Constitutional:       General: He is not in acute distress.     Appearance: He is well-developed and well-nourished. He is not diaphoretic.      Comments: Case discussed at length with patient by phone answers given with good thought content.   Eyes:      Extraocular Movements: EOM normal.   Pulmonary:      Effort: Pulmonary effort is normal. No respiratory distress.      Comments: No shortness of breath noted with his speech  Neurological:      Mental Status: He is alert and oriented to person, place, and time.   Psychiatric:         Mood and Affect: Mood and affect and mood normal.         Behavior: Behavior normal.         Thought Content: Thought content normal.         Judgment: Judgment normal.         Assessment:       1. Hypothyroidism, unspecified type    2. Type 2 diabetes mellitus with other specified complication, without long-term current use of insulin    3. Mixed hyperlipidemia    4. Dyslipidemia    5. S/P coronary artery stent placement    6. Essential hypertension    7. Stage 3a chronic kidney disease    8. Valvular heart  disease    9. Atrial fibrillation with RVR    10. Anemia of chronic disease        Plan:       Hypothyroidism, unspecified type:  Based on patient's weight and slightly reduced free T3 will increase levothyroxine from 62.5 mcg to 75 mcg daily.  This could also help his cholesterol numbers as well as his weight  -     levothyroxine (SYNTHROID) 75 MCG tablet; Take 1 tablet (75 mcg total) by mouth before breakfast. Please provide generic  Dispense: 90 tablet; Refill: 1    Type 2 diabetes mellitus with other specified complication, without long-term current use of insulin:  Blood sugars controlled between 110-126 at home with fasting blood sugar 119; A1c has improved from 6.3 to 6.0.  -     lancets Misc; To check BG once daily before a meal, to use with insurance preferred meter  Dispense: 100 each; Refill: 3  -     blood sugar diagnostic Strp; To check BG once daily before a meal, to use with insurance preferred meter  Dispense: 100 strip; Refill: 3    Mixed hyperlipidemia:  Last 2 orders placed by me for lipid profile unfortunately were not performed by phlebotomy; have been reordered for follow-up.  Low-fat high-fiber diet with continue atorvastatin 40 mg daily.  -     T4, Free; Future; Expected date: 01/04/2022  -     TSH; Future; Expected date: 01/04/2022  -     Lipid Panel; Future; Expected date: 01/04/2022  -     Comprehensive Metabolic Panel; Future; Expected date: 01/04/2022    Dyslipidemia:  Increase aerobic activity and adherence to low-fat high-fiber diet  -     T4, Free; Future; Expected date: 01/04/2022  -     TSH; Future; Expected date: 01/04/2022  -     Lipid Panel; Future; Expected date: 01/04/2022  -     Comprehensive Metabolic Panel; Future; Expected date: 01/04/2022    S/P coronary artery stent placement:  Has been stable patient sees his cardiologist Dr. Ward.  Last Wednesday he had an LAD coronary stent placed and reportedly did fine to have with sounds like an RCA stent placed at a later date  pending.  Cardiology has discontinued furosemide which has helped his GFR patient is no longer with lower extremity edema  -     T4, Free; Future; Expected date: 01/04/2022  -     TSH; Future; Expected date: 01/04/2022  -     Lipid Panel; Future; Expected date: 01/04/2022  -     Comprehensive Metabolic Panel; Future; Expected date: 01/04/2022  -     CBC Auto Differential; Future; Expected date: 01/04/2022    Essential hypertension:  Maintain less than 2 g sodium diet and exercise regularly with continuing to monitor his blood pressures at home and keeping a log for office review  -     Lipid Panel; Future; Expected date: 01/04/2022  -     Comprehensive Metabolic Panel; Future; Expected date: 01/04/2022  -     CBC Auto Differential; Future; Expected date: 01/04/2022    CKD stage IIIA:  GFR has improved further to 53.8 from 45.2.  Patient now off furosemide by cardiology's moved Dr. Ward.  Patient no longer with lower extremity edema better P    Valvular heart disease' has been stable    Atrial fibrillation with RVR:  On Eliquis for anticoagulation per Cardiology also on sotalol.  Managed by Cardiology Dr. Snell  -     T4, Free; Future; Expected date: 01/04/2022  -     TSH; Future; Expected date: 01/04/2022  -     Comprehensive Metabolic Panel; Future; Expected date: 01/04/2022  -     CBC Auto Differential; Future; Expected date: 01/04/2022  -     Magnesium; Future; Expected date: 01/04/2022    Anemia of chronic disease; continue to monitor conservatively resume men's 50+ multivitamin 1 daily ferritin level slightly elevated at 477 with TIBC low normal.  Normocytic anemia with hemoglobin 11.3  -     CBC Auto Differential; Future; Expected date: 01/04/2022

## 2022-01-04 NOTE — Clinical Note
Please call patient and schedule his follow-up appointment for 2 months after the office visit with labs 1 week prior after an overnight fast

## 2022-01-04 NOTE — PATIENT INSTRUCTIONS
Hypothyroidism, unspecified type:  Based on patient's weight and slightly reduced free T3 will increase levothyroxine from 62.5 mcg to 75 mcg daily.  This could also help his cholesterol numbers as well as his weight  -     levothyroxine (SYNTHROID) 75 MCG tablet; Take 1 tablet (75 mcg total) by mouth before breakfast. Please provide generic  Dispense: 90 tablet; Refill: 1    Type 2 diabetes mellitus with other specified complication, without long-term current use of insulin:  Blood sugars controlled between 110-126 at home with fasting blood sugar 119; A1c has improved from 6.3 to 6.0.  -     lancets Misc; To check BG once daily before a meal, to use with insurance preferred meter  Dispense: 100 each; Refill: 3  -     blood sugar diagnostic Strp; To check BG once daily before a meal, to use with insurance preferred meter  Dispense: 100 strip; Refill: 3    Mixed hyperlipidemia:  Last 2 orders placed by me for lipid profile unfortunately were not performed by phlebotomy; have been reordered for follow-up.  Low-fat high-fiber diet with continue atorvastatin 40 mg daily.  -     T4, Free; Future; Expected date: 01/04/2022  -     TSH; Future; Expected date: 01/04/2022  -     Lipid Panel; Future; Expected date: 01/04/2022  -     Comprehensive Metabolic Panel; Future; Expected date: 01/04/2022    Dyslipidemia:  Increase aerobic activity and adherence to low-fat high-fiber diet  -     T4, Free; Future; Expected date: 01/04/2022  -     TSH; Future; Expected date: 01/04/2022  -     Lipid Panel; Future; Expected date: 01/04/2022  -     Comprehensive Metabolic Panel; Future; Expected date: 01/04/2022    S/P coronary artery stent placement:  Has been stable patient sees his cardiologist Dr. Ward.  Last Wednesday he had an LAD coronary stent placed and reportedly did fine to have with sounds like an RCA stent placed at a later date pending.  Cardiology has discontinued furosemide which has helped his GFR patient is no longer  with lower extremity edema  -     T4, Free; Future; Expected date: 01/04/2022  -     TSH; Future; Expected date: 01/04/2022  -     Lipid Panel; Future; Expected date: 01/04/2022  -     Comprehensive Metabolic Panel; Future; Expected date: 01/04/2022  -     CBC Auto Differential; Future; Expected date: 01/04/2022    Essential hypertension:  Maintain less than 2 g sodium diet and exercise regularly with continuing to monitor his blood pressures at home and keeping a log for office review  -     Lipid Panel; Future; Expected date: 01/04/2022  -     Comprehensive Metabolic Panel; Future; Expected date: 01/04/2022  -     CBC Auto Differential; Future; Expected date: 01/04/2022    CKD stage IIIA:  GFR has improved further to 53.8 from 45.2.  Patient now off furosemide by cardiology's moved Dr. Ward.  Patient no longer with lower extremity edema better P    Valvular heart disease    Atrial fibrillation with RVR:  On Eliquis for anticoagulation per Cardiology also on sotalol.  Managed by Cardiology Dr. Snell  -     T4, Free; Future; Expected date: 01/04/2022  -     TSH; Future; Expected date: 01/04/2022  -     Comprehensive Metabolic Panel; Future; Expected date: 01/04/2022  -     CBC Auto Differential; Future; Expected date: 01/04/2022  -     Magnesium; Future; Expected date: 01/04/2022    Anemia of chronic disease; continue to monitor conservatively resume men's 50+ multivitamin 1 daily ferritin level slightly elevated at 477 with TIBC low normal.  Normocytic anemia with hemoglobin 11.3  -     CBC Auto Differential; Future; Expected date: 01/04/2022

## 2022-01-05 RX ORDER — ALBUTEROL SULFATE 90 UG/1
AEROSOL, METERED RESPIRATORY (INHALATION)
Qty: 54 G | Refills: 3 | Status: SHIPPED | OUTPATIENT
Start: 2022-01-05 | End: 2022-11-26

## 2022-01-05 NOTE — TELEPHONE ENCOUNTER
Refill Authorization Note   Jadiel Rossi  is requesting a refill authorization.  Brief Assessment and Rationale for Refill:  Approve     Medication Therapy Plan:  NEBULIZER SOLUTION AND HANDHELD INHALER TOGETHER ACCEPTABLE PER [facility]    Medication Reconciliation Completed: No   Comments:   --->Care Gap information included below if applicable.   Orders Placed This Encounter    albuterol (PROVENTIL/VENTOLIN HFA) 90 mcg/actuation inhaler      Requested Prescriptions   Signed Prescriptions Disp Refills    albuterol (PROVENTIL/VENTOLIN HFA) 90 mcg/actuation inhaler 54 g 3     Sig: INHALE TWO PUFFS INTO THE LUNGS EVERY 6 HOURS AS NEEDED FOR WHEEZING       Pulmonology:  Beta Agonists Passed - 1/5/2022  3:28 AM        Passed - Patient is at least 18 years old        Passed - Last Heart Rate in normal range within 360 days     Pulse Readings from Last 1 Encounters:   09/23/21 74              Passed - Valid encounter within last 15 months     Recent Visits  Date Type Provider Dept   01/04/22 Office Visit Eliud Chandler MD [facility]   09/23/21 Office Visit Eliud Chandler MD [facility]   04/15/21 Office Visit Eliud Chandler MD [facility]   09/24/20 Office Visit Eliud Chandler MD [facility]   06/08/20 Office Visit Eliud Chandler MD [facility]   05/05/20 Office Visit Eliud Chandler MD [facility]   03/03/20 Office Visit Eliud Chandler MD [facility]   Showing recent visits within past 720 days and meeting all other requirements  Future Appointments  No visits were found meeting these conditions.  Showing future appointments within next 150 days and meeting all other requirements                    Appointments  past 12m or future 3m with PCP    Date Provider   Last Visit   1/4/2022 Eliud Chandler MD   Next Visit   Visit date not found Eliud Chandler MD   ED visits in past 90 days: 0     Note composed:2:01 PM 01/05/2022

## 2022-01-05 NOTE — TELEPHONE ENCOUNTER
No new care gaps identified.  Powered by [employer]. Reference number: [id number].   1/05/2022 3:28:38 AM CST

## 2022-01-17 ENCOUNTER — TELEPHONE (OUTPATIENT)
Dept: FAMILY MEDICINE | Facility: CLINIC | Age: 77
End: 2022-01-17
Payer: MEDICARE

## 2022-01-17 DIAGNOSIS — E11.69 TYPE 2 DIABETES MELLITUS WITH OTHER SPECIFIED COMPLICATION, WITHOUT LONG-TERM CURRENT USE OF INSULIN: Primary | ICD-10-CM

## 2022-01-18 ENCOUNTER — PATIENT MESSAGE (OUTPATIENT)
Dept: FAMILY MEDICINE | Facility: CLINIC | Age: 77
End: 2022-01-18
Payer: MEDICARE

## 2022-01-23 DIAGNOSIS — E78.2 MIXED HYPERLIPIDEMIA: ICD-10-CM

## 2022-01-23 DIAGNOSIS — Z95.5 S/P CORONARY ARTERY STENT PLACEMENT: ICD-10-CM

## 2022-01-24 NOTE — TELEPHONE ENCOUNTER
Care Due:                  Date            Visit Type   Department     Provider  --------------------------------------------------------------------------------                                             [facility]  Last Visit: 09-      None         MEDICINE       Eliud Chandler  Next Visit: None Scheduled  None         None Found                                                            Last  Test          Frequency    Reason                     Performed    Due Date  --------------------------------------------------------------------------------    Lipid Panel.  12 months..  atorvastatin.............  Not Found    Overdue    Powered by [employer] by [employer]. Reference number: [id number].   1/23/2022 11:47:49 PM CST

## 2022-01-27 ENCOUNTER — PATIENT MESSAGE (OUTPATIENT)
Dept: FAMILY MEDICINE | Facility: CLINIC | Age: 77
End: 2022-01-27
Payer: MEDICARE

## 2022-01-28 ENCOUNTER — PATIENT OUTREACH (OUTPATIENT)
Dept: ADMINISTRATIVE | Facility: HOSPITAL | Age: 77
End: 2022-01-28
Payer: MEDICARE

## 2022-01-28 ENCOUNTER — PATIENT MESSAGE (OUTPATIENT)
Dept: ADMINISTRATIVE | Facility: HOSPITAL | Age: 77
End: 2022-01-28
Payer: MEDICARE

## 2022-01-28 NOTE — PROGRESS NOTES
Non-compliant report chart audits DIABETIC EYE EXAM.   Chart review completed for HM test overdue (mammograms, Colonoscopies, pap smears, DM labs, and/or EYE EXAMs)      Care Everywhere and media, updates requested and reviewed.      [facility] and [facility] reviewed for tests needed.    DIS reviewed for test needed.  Mammogram       RE:  Patient needs diabetic eye exam.      Outreach to patient    LMOR to return call to clinic    Portal message sent      Outreach:  Diabetic eye exam

## 2022-01-30 DIAGNOSIS — E78.2 MIXED HYPERLIPIDEMIA: ICD-10-CM

## 2022-01-30 DIAGNOSIS — Z95.5 S/P CORONARY ARTERY STENT PLACEMENT: ICD-10-CM

## 2022-01-30 NOTE — TELEPHONE ENCOUNTER
No new care gaps identified.  Powered by [employer]. Reference number: [id number].   1/30/2022 4:32:11 PM CST

## 2022-01-31 RX ORDER — ATORVASTATIN CALCIUM 40 MG/1
TABLET, FILM COATED ORAL
Qty: 90 TABLET | Refills: 1 | Status: SHIPPED | OUTPATIENT
Start: 2022-01-31 | End: 2022-05-06

## 2022-02-11 RX ORDER — ATORVASTATIN CALCIUM 40 MG/1
TABLET, FILM COATED ORAL
Qty: 90 TABLET | Refills: 1 | OUTPATIENT
Start: 2022-02-11

## 2022-02-14 ENCOUNTER — PATIENT OUTREACH (OUTPATIENT)
Dept: ADMINISTRATIVE | Facility: HOSPITAL | Age: 77
End: 2022-02-14
Payer: MEDICARE

## 2022-02-14 ENCOUNTER — PATIENT MESSAGE (OUTPATIENT)
Dept: ADMINISTRATIVE | Facility: HOSPITAL | Age: 77
End: 2022-02-14
Payer: MEDICARE

## 2022-03-10 ENCOUNTER — PATIENT OUTREACH (OUTPATIENT)
Dept: ADMINISTRATIVE | Facility: HOSPITAL | Age: 77
End: 2022-03-10
Payer: MEDICARE

## 2022-03-10 NOTE — LETTER
AUTHORIZATION FOR RELEASE OF   CONFIDENTIAL INFORMATION    Dear Herber Parada MD,    We are seeing Jadiel Rossi, date of birth 1945, in the clinic at [facility]. Eliud Chandler MD is the patient's PCP. Jadiel Rossi has an outstanding lab/procedure at the time we reviewed his chart. In order to help keep his health information updated, he has authorized us to request the following medical record(s):        (  )  MAMMOGRAM                                      (  )  COLONOSCOPY      (  )  PAP SMEAR                                          (  )  OUTSIDE LAB RESULTS     (  )  DEXA SCAN                                          (X)  DIABETIC RETINAL EYE EXAM            (  )  FOOT EXAM                                          (  )  ENTIRE RECORD     (  )  OUTSIDE IMMUNIZATIONS                 (  )  _______________         Please fax records to [facility], Ronald Kluchin, MD, [phone]    If you have any questions, please contact Neeraj Urena LPN Care Coordinator  at [phone].              Patient Name: Jadiel Rossi  : 1945  Patient Phone #: [phone]

## 2022-03-10 NOTE — PROGRESS NOTES
Non-compliant report chart audits DIABETIC EYE EXAM.   Chart review completed for HM test overdue (mammograms, Colonoscopies, pap smears, DM labs, and/or EYE EXAMs)      Care Everywhere and media, updates requested and reviewed.      RE:  Patient needs diabetic eye exam.    Outreach to provider    Fax sent to Herber Parada MD      Outreach:  Diabetic eye exam

## 2022-03-11 ENCOUNTER — PATIENT MESSAGE (OUTPATIENT)
Dept: ADMINISTRATIVE | Facility: HOSPITAL | Age: 77
End: 2022-03-11
Payer: MEDICARE

## 2022-03-11 DIAGNOSIS — I10 ESSENTIAL HYPERTENSION: ICD-10-CM

## 2022-03-11 DIAGNOSIS — N28.9 ACUTE RENAL INSUFFICIENCY: ICD-10-CM

## 2022-03-11 NOTE — TELEPHONE ENCOUNTER

## 2022-03-11 NOTE — TELEPHONE ENCOUNTER
Care Due:                  Date            Visit Type   Department     Provider  --------------------------------------------------------------------------------                                EP -                              PRIMARY      [facility]  Last Visit: 09-      CARE ([facility])   MEDICINE       Eliud Chandler  Next Visit: None Scheduled  None         None Found                                                            Last  Test          Frequency    Reason                     Performed    Due Date  --------------------------------------------------------------------------------    HBA1C.......  6 months...  metFORMIN................  11- 05-    Lipid Panel.  12 months..  atorvastatin.............  04- 04-    Powered by [employer]. Reference number: [id number].   3/11/2022 3:28:44 AM CST

## 2022-03-13 RX ORDER — INDAPAMIDE 1.25 MG/1
TABLET ORAL
Qty: 90 TABLET | Refills: 1 | Status: SHIPPED | OUTPATIENT
Start: 2022-03-13 | End: 2022-05-19

## 2022-03-23 ENCOUNTER — PATIENT OUTREACH (OUTPATIENT)
Dept: ADMINISTRATIVE | Facility: HOSPITAL | Age: 77
End: 2022-03-23
Payer: MEDICARE

## 2022-03-23 ENCOUNTER — PATIENT MESSAGE (OUTPATIENT)
Dept: ADMINISTRATIVE | Facility: HOSPITAL | Age: 77
End: 2022-03-23
Payer: MEDICARE

## 2022-03-23 NOTE — PROGRESS NOTES
2022 Care Everywhere updates requested and reviewed.  Immunizations reconciled. Media reports reviewed.  Duplicate HM overrides and  orders removed.  Overdue HM topic chart audit and/or requested.  Overdue lab testing linked to upcoming lab appointments if applies.    Health Maintenance Due   Topic Date Due    Shingles Vaccine (1 of 2) Never done    Eye Exam  2019    Pneumococcal Vaccines (Age 65+) (1 of 1 - PPSV23) 09/10/2019    Foot Exam  09/10/2019    Influenza Vaccine (1) 2021

## 2022-03-30 NOTE — TELEPHONE ENCOUNTER
Unable to retrieve patient chart and identify care gaps.  Powered by [employer]. Reference number: [id number].   3/29/2022 10:53:04 PM CDT

## 2022-03-31 NOTE — TELEPHONE ENCOUNTER
Refill Routing Note   Medication(s) are not appropriate for processing by [facility] for the following reason(s):      - Drug-Disease Interaction (ACUTE HYPOXEMIC RESPIRATORY FAILURE)    [facility] action(s):  Defer Medication-related problems identified: Drug-disease interaction        --->Care Gap information included in message below if applicable.   Medication reconciliation completed: No   Automatic Epic Generated Protocol Data:        Requested Prescriptions   Pending Prescriptions Disp Refills    metFORMIN (GLUCOPHAGE) 500 MG tablet [Pharmacy Med Name: METFORMIN 500MG TABLETS] 180 tablet 1     Sig: Take 1 tablet (500 mg total) by mouth 2 (two) times daily.        Antihyperglycemics Protocol Passed - 3/31/2022  8:07 AM        Passed - Recent visit in the past year        Passed - Normal creatinine in past 6 months       Lab Results   Component Value Date    CREATININE 1.24 01/27/2022              Passed - Had HBA1C in past 6 months       Hemoglobin A1C   Date Value Ref Range Status   11/18/2021 6.0 (H) 4.0 - 5.6 % Final     Comment:     ADA Screening Guidelines:  5.7-6.4%  Consistent with prediabetes  >or=6.5%  Consistent with diabetes    High levels of fetal hemoglobin interfere with the HbA1C  assay. Heterozygous hemoglobin variants (HbS, HgC, etc)do  not significantly interfere with this assay.   However, presence of multiple variants may affect accuracy.     04/13/2021 6.3 (H) 4.0 - 5.6 % Final     Comment:     ADA Screening Guidelines:  5.7-6.4%  Consistent with prediabetes  >or=6.5%  Consistent with diabetes    High levels of fetal hemoglobin interfere with the HbA1C  assay. Heterozygous hemoglobin variants (HbS, HgC, etc)do  not significantly interfere with this assay.   However, presence of multiple variants may affect accuracy.     09/16/2020 6.9 (H) 4.0 - 5.6 % Final     Comment:     ADA Screening Guidelines:  5.7-6.4%  Consistent with prediabetes  >or=6.5%  Consistent with diabetes  High levels  of fetal hemoglobin interfere with the HbA1C  assay. Heterozygous hemoglobin variants (HbS, HgC, etc)do  not significantly interfere with this assay.   However, presence of multiple variants may affect accuracy.              Endocrinology:  Diabetes - Biguanides Passed - 3/31/2022  8:07 AM        Passed - Patient is at least 18 years old        Passed - Valid encounter within last 15 months     Recent Visits  Date Type Provider Dept   09/23/21 Office Visit Eliud Chandler MD [facility]   04/15/21 Office Visit Eliud Chandler MD [facility]   09/24/20 Office Visit Eliud Chandler MD [facility]   06/08/20 Office Visit Eliud Chandler MD [facility]   05/05/20 Office Visit Eliud Chandler MD [facility]   Showing recent visits within past 720 days and meeting all other requirements  Future Appointments  No visits were found meeting these conditions.  Showing future appointments within next 150 days and meeting all other requirements        Future Appointments                In 6 days Eliud Chandler MD [facility]             Passed - Matches previous order         Previous Authorizing Provider: Eliud Chandler MD (metFORMIN (GLUCOPHAGE) 500 MG tablet)  Previous Pharmacy: [id number] [place] [id number] AT [facility] [address] [facility]            Passed - No ED/Hospital visits since last PCP visit     Last PCP Visit: 1/4/2022 Last Admission: 4/26/2020 Last ED Visit: 4/17/2021          Passed - Cr is 1.39 or below and within 360 days       Lab Results   Component Value Date    CREATININE 1.24 01/27/2022    CREATININE 1.3 11/18/2021    CREATININE 1.5 (H) 08/06/2021    POCCRE 1.6 (H) 06/21/2021    POCCRE 1.3 (H) 04/17/2021    POCCRE 1.2 04/26/2020              Passed - HBA1C within 180 days       Lab Results   Component Value Date    HGBA1C 6.0 (H) 11/18/2021    HGBA1C 6.3 (H) 04/13/2021    HGBA1C 6.9 (H)  09/16/2020              Passed - eGFR is 45 or above and within 360 days     Lab Results   Component Value Date    POCEGFR 53 (L) 04/17/2021    POCEGFR 57 (L) 04/26/2020    EGFRNONAA 57 (A) 01/27/2022    EGFRNONAA 53.8 (A) 11/18/2021    EGFRNONAA 45.2 (A) 08/06/2021                       Appointments  past 12m or future 3m with PCP    Date Provider   Last Visit   1/4/2022 Eliud Chandler MD   Next Visit   4/6/2022 Eliud Chandler MD   ED visits in past 90 days: 0        Note composed:8:12 AM 03/31/2022

## 2022-04-05 RX ORDER — METFORMIN HYDROCHLORIDE 500 MG/1
500 TABLET ORAL 2 TIMES DAILY
Qty: 180 TABLET | Refills: 1 | Status: SHIPPED | OUTPATIENT
Start: 2022-04-05 | End: 2022-10-04

## 2022-04-18 ENCOUNTER — PES CALL (OUTPATIENT)
Dept: ADMINISTRATIVE | Facility: CLINIC | Age: 77
End: 2022-04-18
Payer: MEDICARE

## 2022-05-04 DIAGNOSIS — E78.2 MIXED HYPERLIPIDEMIA: ICD-10-CM

## 2022-05-04 DIAGNOSIS — Z95.5 S/P CORONARY ARTERY STENT PLACEMENT: ICD-10-CM

## 2022-05-04 LAB
LEFT EYE DM RETINOPATHY: NEGATIVE
RIGHT EYE DM RETINOPATHY: NEGATIVE

## 2022-05-04 NOTE — TELEPHONE ENCOUNTER
No new care gaps identified.  [facility] Embedded Care Gaps. Reference number: [id number]. 5/04/2022   8:04:45 AM CDT

## 2022-05-04 NOTE — TELEPHONE ENCOUNTER
Refill Routing Note   Medication(s) are not appropriate for processing by [facility] for the following reason(s):      - Required laboratory values are outdated    [facility] action(s):  Defer          Medication reconciliation completed: No     Appointments  past 12m or future 3m with PCP    Date Provider   Last Visit   1/4/2022 Eliud Chandler MD   Next Visit   5/19/2022 Eliud Chandler MD   ED visits in past 90 days: 0        Note composed:2:25 PM 05/04/2022

## 2022-05-06 RX ORDER — ATORVASTATIN CALCIUM 40 MG/1
TABLET, FILM COATED ORAL
Qty: 90 TABLET | Refills: 1 | Status: SHIPPED | OUTPATIENT
Start: 2022-05-06 | End: 2023-02-14 | Stop reason: SDUPTHER

## 2022-05-18 ENCOUNTER — LAB VISIT (OUTPATIENT)
Dept: LAB | Facility: HOSPITAL | Age: 77
End: 2022-05-18
Attending: INTERNAL MEDICINE
Payer: MEDICARE

## 2022-05-18 DIAGNOSIS — E11.69 TYPE 2 DIABETES MELLITUS WITH OTHER SPECIFIED COMPLICATION, WITHOUT LONG-TERM CURRENT USE OF INSULIN: ICD-10-CM

## 2022-05-18 DIAGNOSIS — E78.2 MIXED HYPERLIPIDEMIA: ICD-10-CM

## 2022-05-18 DIAGNOSIS — I48.91 ATRIAL FIBRILLATION WITH RVR: ICD-10-CM

## 2022-05-18 DIAGNOSIS — I10 ESSENTIAL HYPERTENSION: ICD-10-CM

## 2022-05-18 DIAGNOSIS — Z95.5 S/P CORONARY ARTERY STENT PLACEMENT: ICD-10-CM

## 2022-05-18 DIAGNOSIS — D63.8 ANEMIA OF CHRONIC DISEASE: ICD-10-CM

## 2022-05-18 DIAGNOSIS — E78.5 DYSLIPIDEMIA: ICD-10-CM

## 2022-05-18 LAB
ALBUMIN SERPL BCP-MCNC: 3.6 G/DL (ref 3.5–5.2)
ALP SERPL-CCNC: 55 U/L (ref 55–135)
ALT SERPL W/O P-5'-P-CCNC: 21 U/L (ref 10–44)
ANION GAP SERPL CALC-SCNC: 11 MMOL/L (ref 8–16)
AST SERPL-CCNC: 23 U/L (ref 10–40)
BASOPHILS # BLD AUTO: 0.04 K/UL (ref 0–0.2)
BASOPHILS NFR BLD: 0.5 % (ref 0–1.9)
BILIRUB SERPL-MCNC: 0.7 MG/DL (ref 0.1–1)
BUN SERPL-MCNC: 23 MG/DL (ref 8–23)
CALCIUM SERPL-MCNC: 9.7 MG/DL (ref 8.7–10.5)
CHLORIDE SERPL-SCNC: 99 MMOL/L (ref 95–110)
CHOLEST SERPL-MCNC: 137 MG/DL (ref 120–199)
CHOLEST SERPL-MCNC: 137 MG/DL (ref 120–199)
CHOLEST/HDLC SERPL: 4 {RATIO} (ref 2–5)
CHOLEST/HDLC SERPL: 4 {RATIO} (ref 2–5)
CO2 SERPL-SCNC: 29 MMOL/L (ref 23–29)
CREAT SERPL-MCNC: 1.5 MG/DL (ref 0.5–1.4)
DIFFERENTIAL METHOD: ABNORMAL
EOSINOPHIL # BLD AUTO: 0.5 K/UL (ref 0–0.5)
EOSINOPHIL NFR BLD: 5.6 % (ref 0–8)
ERYTHROCYTE [DISTWIDTH] IN BLOOD BY AUTOMATED COUNT: 12.8 % (ref 11.5–14.5)
EST. GFR  (AFRICAN AMERICAN): 51.9 ML/MIN/1.73 M^2
EST. GFR  (NON AFRICAN AMERICAN): 44.9 ML/MIN/1.73 M^2
ESTIMATED AVG GLUCOSE: 123 MG/DL (ref 68–131)
GLUCOSE SERPL-MCNC: 111 MG/DL (ref 70–110)
HBA1C MFR BLD: 5.9 % (ref 4–5.6)
HCT VFR BLD AUTO: 32.9 % (ref 40–54)
HDLC SERPL-MCNC: 34 MG/DL (ref 40–75)
HDLC SERPL-MCNC: 34 MG/DL (ref 40–75)
HDLC SERPL: 24.8 % (ref 20–50)
HDLC SERPL: 24.8 % (ref 20–50)
HGB BLD-MCNC: 10.6 G/DL (ref 14–18)
IMM GRANULOCYTES # BLD AUTO: 0.02 K/UL (ref 0–0.04)
IMM GRANULOCYTES NFR BLD AUTO: 0.2 % (ref 0–0.5)
LDLC SERPL CALC-MCNC: 72 MG/DL (ref 63–159)
LDLC SERPL CALC-MCNC: 72 MG/DL (ref 63–159)
LYMPHOCYTES # BLD AUTO: 1.7 K/UL (ref 1–4.8)
LYMPHOCYTES NFR BLD: 21.6 % (ref 18–48)
MAGNESIUM SERPL-MCNC: 1.5 MG/DL (ref 1.6–2.6)
MCH RBC QN AUTO: 30.5 PG (ref 27–31)
MCHC RBC AUTO-ENTMCNC: 32.2 G/DL (ref 32–36)
MCV RBC AUTO: 95 FL (ref 82–98)
MONOCYTES # BLD AUTO: 0.8 K/UL (ref 0.3–1)
MONOCYTES NFR BLD: 10.3 % (ref 4–15)
NEUTROPHILS # BLD AUTO: 5 K/UL (ref 1.8–7.7)
NEUTROPHILS NFR BLD: 61.8 % (ref 38–73)
NONHDLC SERPL-MCNC: 103 MG/DL
NONHDLC SERPL-MCNC: 103 MG/DL
NRBC BLD-RTO: 0 /100 WBC
PLATELET # BLD AUTO: 219 K/UL (ref 150–450)
PMV BLD AUTO: 10.6 FL (ref 9.2–12.9)
POTASSIUM SERPL-SCNC: 4.3 MMOL/L (ref 3.5–5.1)
PROT SERPL-MCNC: 6.9 G/DL (ref 6–8.4)
RBC # BLD AUTO: 3.47 M/UL (ref 4.6–6.2)
SODIUM SERPL-SCNC: 139 MMOL/L (ref 136–145)
T4 FREE SERPL-MCNC: 0.93 NG/DL (ref 0.71–1.51)
TRIGL SERPL-MCNC: 155 MG/DL (ref 30–150)
TRIGL SERPL-MCNC: 155 MG/DL (ref 30–150)
TSH SERPL DL<=0.005 MIU/L-ACNC: 3.13 UIU/ML (ref 0.4–4)
WBC # BLD AUTO: 8.02 K/UL (ref 3.9–12.7)

## 2022-05-18 PROCEDURE — 36415 COLL VENOUS BLD VENIPUNCTURE: CPT | Mod: PN | Performed by: INTERNAL MEDICINE

## 2022-05-18 PROCEDURE — 83036 HEMOGLOBIN GLYCOSYLATED A1C: CPT | Performed by: INTERNAL MEDICINE

## 2022-05-18 PROCEDURE — 85025 COMPLETE CBC W/AUTO DIFF WBC: CPT | Performed by: INTERNAL MEDICINE

## 2022-05-18 PROCEDURE — 80053 COMPREHEN METABOLIC PANEL: CPT | Performed by: INTERNAL MEDICINE

## 2022-05-18 PROCEDURE — 84439 ASSAY OF FREE THYROXINE: CPT | Performed by: INTERNAL MEDICINE

## 2022-05-18 PROCEDURE — 83735 ASSAY OF MAGNESIUM: CPT | Performed by: INTERNAL MEDICINE

## 2022-05-18 PROCEDURE — 80061 LIPID PANEL: CPT | Performed by: INTERNAL MEDICINE

## 2022-05-18 PROCEDURE — 84443 ASSAY THYROID STIM HORMONE: CPT | Performed by: INTERNAL MEDICINE

## 2022-05-19 ENCOUNTER — OFFICE VISIT (OUTPATIENT)
Dept: FAMILY MEDICINE | Facility: CLINIC | Age: 77
End: 2022-05-19
Payer: MEDICARE

## 2022-05-19 VITALS
BODY MASS INDEX: 28.29 KG/M2 | RESPIRATION RATE: 18 BRPM | HEART RATE: 65 BPM | WEIGHT: 197.63 LBS | OXYGEN SATURATION: 96 % | SYSTOLIC BLOOD PRESSURE: 138 MMHG | HEIGHT: 70 IN | DIASTOLIC BLOOD PRESSURE: 74 MMHG

## 2022-05-19 DIAGNOSIS — D63.8 ANEMIA OF CHRONIC DISEASE: ICD-10-CM

## 2022-05-19 DIAGNOSIS — Z95.5 S/P CORONARY ARTERY STENT PLACEMENT: ICD-10-CM

## 2022-05-19 DIAGNOSIS — E78.2 MIXED HYPERLIPIDEMIA: ICD-10-CM

## 2022-05-19 DIAGNOSIS — G47.33 OSA (OBSTRUCTIVE SLEEP APNEA): ICD-10-CM

## 2022-05-19 DIAGNOSIS — N18.31 STAGE 3A CHRONIC KIDNEY DISEASE: ICD-10-CM

## 2022-05-19 DIAGNOSIS — D50.9 IRON DEFICIENCY ANEMIA, UNSPECIFIED IRON DEFICIENCY ANEMIA TYPE: ICD-10-CM

## 2022-05-19 DIAGNOSIS — N28.9 ACUTE RENAL INSUFFICIENCY: ICD-10-CM

## 2022-05-19 DIAGNOSIS — E03.9 HYPOTHYROIDISM, UNSPECIFIED TYPE: ICD-10-CM

## 2022-05-19 DIAGNOSIS — I38 VALVULAR HEART DISEASE: ICD-10-CM

## 2022-05-19 DIAGNOSIS — Z01.89 ENCOUNTER FOR LABORATORY TEST: ICD-10-CM

## 2022-05-19 DIAGNOSIS — I10 ESSENTIAL HYPERTENSION: Primary | ICD-10-CM

## 2022-05-19 PROCEDURE — 99214 OFFICE O/P EST MOD 30 MIN: CPT | Mod: S$GLB,,, | Performed by: INTERNAL MEDICINE

## 2022-05-19 PROCEDURE — 1101F PR PT FALLS ASSESS DOC 0-1 FALLS W/OUT INJ PAST YR: ICD-10-PCS | Mod: CPTII,S$GLB,, | Performed by: INTERNAL MEDICINE

## 2022-05-19 PROCEDURE — 1160F PR REVIEW ALL MEDS BY PRESCRIBER/CLIN PHARMACIST DOCUMENTED: ICD-10-PCS | Mod: CPTII,S$GLB,, | Performed by: INTERNAL MEDICINE

## 2022-05-19 PROCEDURE — 1126F AMNT PAIN NOTED NONE PRSNT: CPT | Mod: CPTII,S$GLB,, | Performed by: INTERNAL MEDICINE

## 2022-05-19 PROCEDURE — 3078F PR MOST RECENT DIASTOLIC BLOOD PRESSURE < 80 MM HG: ICD-10-PCS | Mod: CPTII,S$GLB,, | Performed by: INTERNAL MEDICINE

## 2022-05-19 PROCEDURE — 1159F PR MEDICATION LIST DOCUMENTED IN MEDICAL RECORD: ICD-10-PCS | Mod: CPTII,S$GLB,, | Performed by: INTERNAL MEDICINE

## 2022-05-19 PROCEDURE — 1101F PT FALLS ASSESS-DOCD LE1/YR: CPT | Mod: CPTII,S$GLB,, | Performed by: INTERNAL MEDICINE

## 2022-05-19 PROCEDURE — 3288F FALL RISK ASSESSMENT DOCD: CPT | Mod: CPTII,S$GLB,, | Performed by: INTERNAL MEDICINE

## 2022-05-19 PROCEDURE — 99499 RISK ADDL DX/OHS AUDIT: ICD-10-PCS | Mod: S$GLB,,, | Performed by: INTERNAL MEDICINE

## 2022-05-19 PROCEDURE — 99999 PR PBB SHADOW E&M-EST. PATIENT-LVL IV: ICD-10-PCS | Mod: PBBFAC,,, | Performed by: INTERNAL MEDICINE

## 2022-05-19 PROCEDURE — 1126F PR PAIN SEVERITY QUANTIFIED, NO PAIN PRESENT: ICD-10-PCS | Mod: CPTII,S$GLB,, | Performed by: INTERNAL MEDICINE

## 2022-05-19 PROCEDURE — 3075F SYST BP GE 130 - 139MM HG: CPT | Mod: CPTII,S$GLB,, | Performed by: INTERNAL MEDICINE

## 2022-05-19 PROCEDURE — 99214 PR OFFICE/OUTPT VISIT, EST, LEVL IV, 30-39 MIN: ICD-10-PCS | Mod: S$GLB,,, | Performed by: INTERNAL MEDICINE

## 2022-05-19 PROCEDURE — 1160F RVW MEDS BY RX/DR IN RCRD: CPT | Mod: CPTII,S$GLB,, | Performed by: INTERNAL MEDICINE

## 2022-05-19 PROCEDURE — 3075F PR MOST RECENT SYSTOLIC BLOOD PRESS GE 130-139MM HG: ICD-10-PCS | Mod: CPTII,S$GLB,, | Performed by: INTERNAL MEDICINE

## 2022-05-19 PROCEDURE — 99499 UNLISTED E&M SERVICE: CPT | Mod: S$GLB,,, | Performed by: INTERNAL MEDICINE

## 2022-05-19 PROCEDURE — 1159F MED LIST DOCD IN RCRD: CPT | Mod: CPTII,S$GLB,, | Performed by: INTERNAL MEDICINE

## 2022-05-19 PROCEDURE — 99999 PR PBB SHADOW E&M-EST. PATIENT-LVL IV: CPT | Mod: PBBFAC,,, | Performed by: INTERNAL MEDICINE

## 2022-05-19 PROCEDURE — 3044F HG A1C LEVEL LT 7.0%: CPT | Mod: CPTII,S$GLB,, | Performed by: INTERNAL MEDICINE

## 2022-05-19 PROCEDURE — 3288F PR FALLS RISK ASSESSMENT DOCUMENTED: ICD-10-PCS | Mod: CPTII,S$GLB,, | Performed by: INTERNAL MEDICINE

## 2022-05-19 PROCEDURE — 3078F DIAST BP <80 MM HG: CPT | Mod: CPTII,S$GLB,, | Performed by: INTERNAL MEDICINE

## 2022-05-19 PROCEDURE — 3044F PR MOST RECENT HEMOGLOBIN A1C LEVEL <7.0%: ICD-10-PCS | Mod: CPTII,S$GLB,, | Performed by: INTERNAL MEDICINE

## 2022-05-19 RX ORDER — CLOPIDOGREL BISULFATE 75 MG/1
75 TABLET ORAL DAILY
COMMUNITY
Start: 2022-05-06 | End: 2024-03-05

## 2022-05-19 RX ORDER — LOSARTAN POTASSIUM 100 MG/1
100 TABLET ORAL DAILY
COMMUNITY
Start: 2022-03-21 | End: 2023-10-27

## 2022-05-19 RX ORDER — AMLODIPINE BESYLATE 2.5 MG/1
2.5 TABLET ORAL DAILY
Qty: 30 TABLET | Refills: 2 | Status: SHIPPED | OUTPATIENT
Start: 2022-05-19 | End: 2022-07-29

## 2022-05-19 RX ORDER — LEVOTHYROXINE SODIUM 88 UG/1
88 TABLET ORAL
Qty: 30 TABLET | Refills: 2 | Status: SHIPPED | OUTPATIENT
Start: 2022-05-19 | End: 2022-08-31

## 2022-05-19 NOTE — PATIENT INSTRUCTIONS
Essential hypertension: Maintain < 2 Gm Na a day diet, and monitor BP at home; keep a log.  -     amLODIPine (NORVASC) 2.5 MG tablet; Take 1 tablet (2.5 mg total) by mouth once daily.  Dispense: 30 tablet; Refill: 2  -     CBC Auto Differential; Future; Expected date: 05/19/2022  -     Basic Metabolic Panel; Future; Expected date: 05/19/2022  -     Reticulocytes; Future; Expected date: 05/19/2022  -     T4, Free; Future; Expected date: 05/19/2022  -     TSH; Future; Expected date: 05/19/2022    Mixed hyperlipidemia; Maintain low fat high fiber diet, exercise regularly. Weight reduction where indicated. Cont atorvastatin    Valvular heart disease: followed by dr Ward    S/P coronary artery stent placement: CAD has been stable without any chest pain, shortness of breath, or palpitations. Keep follow up with cardiologist as directed.     Iron deficiency anemia, unspecified iron deficiency anemia type; cont Renocaps one a day  -     Occult blood x 1, stool; Future; Expected date: 05/19/2022  -     Occult blood x 1, stool; Future; Expected date: 05/19/2022  -     Occult blood x 1, stool; Future; Expected date: 05/19/2022  -     CBC Auto Differential; Future; Expected date: 05/19/2022  -     Iron and TIBC; Future; Expected date: 05/19/2022  -     Ferritin; Future; Expected date: 05/19/2022  -     T4, Free; Future; Expected date: 05/19/2022  -     TSH; Future; Expected date: 05/19/2022    ROBBY (obstructive sleep apnea); CPAP intolerant    Stage 3a chronic kidney disease; keep hydrated adequately    Acute renal insufficiency: stop indapamide. Can double amlodipine if needed.   -     amLODIPine (NORVASC) 2.5 MG tablet; Take 1 tablet (2.5 mg total) by mouth once daily.  Dispense: 30 tablet; Refill: 2  -     CBC Auto Differential; Future; Expected date: 05/19/2022  -     Basic Metabolic Panel; Future; Expected date: 05/19/2022  -     Iron and TIBC; Future; Expected date: 05/19/2022  -     Ferritin; Future; Expected date:  05/19/2022  -     Reticulocytes; Future; Expected date: 05/19/2022    Anemia of chronic disease  -     Occult blood x 1, stool; Future; Expected date: 05/19/2022  -     Occult blood x 1, stool; Future; Expected date: 05/19/2022  -     Occult blood x 1, stool; Future; Expected date: 05/19/2022  -     CBC Auto Differential; Future; Expected date: 05/19/2022    Hypothyroidism, unspecified type; increase 75 to 88 mcg of levothyroxine a day.   -     T4, Free; Future; Expected date: 05/19/2022  -     TSH; Future; Expected date: 05/19/2022

## 2022-05-19 NOTE — PROGRESS NOTES
Subjective:       Patient ID: Jadiel Rossi is a 76 y.o. male.    Chief Complaint: Follow-up    HPI:  Patient here today for reassessment and go over his lab work results as well.  Essential hypertension: Maintain < 2 Gm Na a day diet, and monitor BP at home; keep a log.  Blood pressure today manually is 138/74 with pulse 65.   -     amLODIPine (NORVASC) 2.5 MG tablet; Take 1 tablet (2.5 mg total) by mouth once daily.  Dispense: 30 tablet; Refill: 2  -     CBC Auto Differential; Future; Expected date: 05/19/2022  -     Basic Metabolic Panel; Future; Expected date: 05/19/2022  -     Reticulocytes; Future; Expected date: 05/19/2022  -     T4, Free; Future; Expected date: 05/19/2022  -     TSH; Future; Expected date: 05/19/2022  Mixed hyperlipidemia; Maintain low fat high fiber diet, exercise regularly. Weight reduction where indicated. Cont atorvastatin  Valvular heart disease: followed by Dr Ward.  No complaints of chest pain or shortness of breath  S/P coronary artery stent placement: CAD has been stable without any chest pain, shortness of breath, or palpitations. Keep follow up with cardiologist Dr. Ward as directed.   Iron deficiency anemia, unspecified iron deficiency anemia type; cont Renocaps one a day; hemoglobin down from 11.2 to 10.6 with GFR down to 44.9 from 57. With follow-up CBC will check iron studies as well as stool for occult blood x3, but may be easily be due to worsened renal function  -     Occult blood x 1, stool; Future; Expected date: 05/19/2022  -     Occult blood x 1, stool; Future; Expected date: 05/19/2022  -     Occult blood x 1, stool; Future; Expected date: 05/19/2022  -     CBC Auto Differential; Future; Expected date: 05/19/2022  -     Iron and TIBC; Future; Expected date: 05/19/2022  -     Ferritin; Future; Expected date: 05/19/2022  -     T4, Free; Future; Expected date: 05/19/2022  -     TSH; Future; Expected date: 05/19/2022  ROBBY (obstructive sleep apnea); CPAP intolerant;  continue attempts at weight reduction will help BMI 28.36  Stage 3a chronic kidney disease; keep hydrated adequately; GFR has worsened from 57-44.9.  To push fluids during the day; knows not to take any NSA ID agents; can use Tylenol over-the-counter for any pain.  Will stop indapamide.  Acute renal insufficiency: stop indapamide. Can double amlodipine if needed.   -     amLODIPine (NORVASC) 2.5 MG tablet; Take 1 tablet (2.5 mg total) by mouth once daily.  Dispense: 30 tablet; Refill: 2  -     CBC Auto Differential; Future; Expected date: 05/19/2022  -     Basic Metabolic Panel; Future; Expected date: 05/19/2022  -     Iron and TIBC; Future; Expected date: 05/19/2022  -     Ferritin; Future; Expected date: 05/19/2022  -     Reticulocytes; Future; Expected date: 05/19/2022  Anemia of chronic disease  -     Occult blood x 1, stool; Future; Expected date: 05/19/2022  -     Occult blood x 1, stool; Future; Expected date: 05/19/2022  -     Occult blood x 1, stool; Future; Expected date: 05/19/2022  -     CBC Auto Differential; Future; Expected date: 05/19/2022  Hypothyroidism, unspecified type; increase 75 mcg to 88 mcg of levothyroxine a day.   -     T4, Free; Future; Expected date: 05/19/2022  -     TSH; Future; Expected date: 05/19/2022  Encounter for lab test:  Labs reviewed and discussed with patient at length in ordered for follow-up.  Total time 5:18 p.m. through 5:52 p.m..  Greater than 50% of the time spent in discussion, counseling, and review.  Labs reviewed and discussed with patient at length in ordered for follow-up.  Various different diagnosis is were entertained and discussed with patient including plan of care    Review of Systems   Constitutional: Negative for activity change and unexpected weight change.   HENT: Negative for hearing loss, rhinorrhea and trouble swallowing.    Eyes: Negative for discharge and visual disturbance.   Respiratory: Negative for chest tightness and wheezing.   "  Cardiovascular: Negative for chest pain and palpitations.   Gastrointestinal: Negative for blood in stool, constipation, diarrhea and vomiting.   Endocrine: Negative for polydipsia and polyuria.   Genitourinary: Negative for difficulty urinating, hematuria and urgency.   Musculoskeletal: Negative for arthralgias, joint swelling and neck pain.   Neurological: Negative for weakness and headaches.   Psychiatric/Behavioral: Negative for confusion and dysphoric mood. The patient is not nervous/anxious.       Objective:        Vitals:    05/19/22 1635   BP: 138/74   Pulse: 65   Resp: 18   SpO2: 96%   Weight: 89.7 kg (197 lb 10.3 oz)   Height: 5' 10" (1.778 m)       BMI Readings from Last 3 Encounters:   05/19/22 28.36 kg/m²   09/23/21 29.43 kg/m²   06/02/21 30.53 kg/m²        Wt Readings from Last 3 Encounters:   05/19/22 1635 89.7 kg (197 lb 10.3 oz)   09/23/21 1601 93.1 kg (205 lb 2.2 oz)   06/02/21 1310 96.5 kg (212 lb 12.8 oz)        BP Readings from Last 3 Encounters:   05/19/22 138/74   09/23/21 (!) 156/72   06/02/21 138/70        There are no preventive care reminders to display for this patient.     Health Maintenance Due   Topic Date Due    Shingles Vaccine (1 of 2) Never done    Eye Exam  05/25/2019    Pneumococcal Vaccines (Age 65+) (2 - PPSV23 or PCV20) 09/10/2019    Foot Exam  09/10/2019    COVID-19 Vaccine (4 - Booster for Pfizer series) 12/19/2021         Past Medical History:   Diagnosis Date    Anemia     Anxiety     Atrial fibrillation     ad[facility] to [facility] 7/1/18 for AF w RVR; no cardioversion needed; Dr Mendes EPS. Slowed down on its own and back to NSR; placed on eliquis.     Carotid artery plaque     bilateral    Diabetes mellitus, type 2     Essential hypertension     Hypertension     Hypothyroidism     Liver dysfunction     Methylenetetrahydrofolate reductase (MTHFR) gene mutation     Mixed hyperlipidemia     Obesity     ROBBY (obstructive sleep apnea)     Proteinuria     Valvular " heart disease        Past Surgical History:   Procedure Laterality Date    APPENDECTOMY  9050-8303    COLONOSCOPY N/A 2019    Procedure: COLONOSCOPY;  Surgeon: Jimenez Solomon Jr., MD;  Location: [facility] ENDO;  Service: Endoscopy;  Laterality: N/A;    CORONARY STENT PLACEMENT  2017    LAD by Dr Ward    ESOPHAGOGASTRODUODENOSCOPY N/A 2019    Procedure: EGD (ESOPHAGOGASTRODUODENOSCOPY);  Surgeon: Jimenez Solomon Jr., MD;  Location: [facility] ENDO;  Service: Endoscopy;  Laterality: N/A;       Social History     Tobacco Use    Smoking status: Never Smoker    Smokeless tobacco: Never Used   Substance Use Topics    Alcohol use: Yes     Alcohol/week: 16.0 standard drinks     Types: 8 Glasses of wine, 8 Shots of liquor per week    Drug use: No       Family History   Problem Relation Age of Onset    Cancer Mother     Heart disease Mother     Heart disease Father     Arthritis Father     Hypertension Father     Cancer Sister         breast cancer    Diabetes Sister     Asthma Brother     COPD Brother     Diabetes Brother     Stroke Brother     Cancer Sister         leukemia    Early death Sister          at 64 of leukemia    Colon cancer Neg Hx     Crohn's disease Neg Hx     Esophageal cancer Neg Hx     Ulcerative colitis Neg Hx     Stomach cancer Neg Hx        Review of patient's allergies indicates:   Allergen Reactions    Multaq [dronedarone]        Current Outpatient Medications on File Prior to Visit   Medication Sig Dispense Refill    albuterol (PROVENTIL/VENTOLIN HFA) 90 mcg/actuation inhaler INHALE TWO PUFFS INTO THE LUNGS EVERY 6 HOURS AS NEEDED FOR WHEEZING 54 g 3    atorvastatin (LIPITOR) 40 MG tablet TAKE 1 TABLET(40 MG) BY MOUTH EVERY DAY 90 tablet 1    blood sugar diagnostic Strp To check BG once daily before a meal, to use with insurance preferred meter 100 strip 3    budesonide-formoterol 80-4.5 mcg (SYMBICORT) 80-4.5 mcg/actuation HFAA Controller; 2 puffs 2x a day;  please provide generic. 10.2 g 2    ELIQUIS 5 mg Tab Take 1 tablet (5 mg total) by mouth 2 (two) times daily. 60 tablet 3    lancets Misc To check BG once daily before a meal, to use with insurance preferred meter 100 each 3    metFORMIN (GLUCOPHAGE) 500 MG tablet Take 1 tablet (500 mg total) by mouth 2 (two) times daily. 180 tablet 1    multivitamin (THERAGRAN) per tablet Take 1 tablet by mouth once daily.      nitroGLYCERIN (NITROSTAT) 0.4 MG SL tablet DISSOLVE ONE T SUBLINGUALLY Q 5 MINUTES X 3 DOSES PRN FOR CHEST PAIN  0    sotaloL (BETAPACE) 80 MG tablet       SPIRIVA WITH HANDIHALER 18 mcg inhalation capsule as needed.       albuterol-ipratropium (DUO-NEB) 2.5 mg-0.5 mg/3 mL nebulizer solution Take 3 mLs by nebulization every 6 (six) hours as needed for Wheezing. Rescue 120 vial 3    budesonide (PULMICORT) 0.5 mg/2 mL nebulizer solution Take 2 mLs (0.5 mg total) by nebulization 2 (two) times daily. Controller 120 mL 3    clopidogreL (PLAVIX) 75 mg tablet Take 75 mg by mouth once daily.      losartan (COZAAR) 100 MG tablet Take 100 mg by mouth once daily.       No current facility-administered medications on file prior to visit.       Physical Exam  Vitals reviewed.   Constitutional:       Appearance: He is well-developed.   HENT:      Head: Normocephalic and atraumatic.   Neck:      Thyroid: No thyromegaly.   Cardiovascular:      Rate and Rhythm: Normal rate and regular rhythm.      Heart sounds: Normal heart sounds. No murmur heard.    No gallop.      Comments: MARCO ANTONIO 2/6 thru  Pulmonary:      Effort: Pulmonary effort is normal. No respiratory distress.      Breath sounds: Normal breath sounds. No wheezing or rales.   Abdominal:      General: Bowel sounds are normal. There is no distension.      Palpations: Abdomen is soft.      Tenderness: There is no abdominal tenderness. There is no guarding or rebound.   Musculoskeletal:         General: Normal range of motion.      Cervical back: Normal range of  motion and neck supple.      Right lower leg: No edema.      Left lower leg: No edema.   Lymphadenopathy:      Cervical: No cervical adenopathy.   Skin:     Findings: No rash.   Neurological:      Mental Status: He is alert and oriented to person, place, and time.      Comments: Moves all 4 extremities fine.   Psychiatric:         Behavior: Behavior normal.         Thought Content: Thought content normal.         Assessment:       1. Essential hypertension    2. Mixed hyperlipidemia    3. Valvular heart disease    4. S/P coronary artery stent placement    5. Iron deficiency anemia, unspecified iron deficiency anemia type    6. ROBBY (obstructive sleep apnea)    7. Stage 3a chronic kidney disease    8. Acute renal insufficiency    9. Anemia of chronic disease    10. Hypothyroidism, unspecified type        Plan:       Essential hypertension: Maintain < 2 Gm Na a day diet, and monitor BP at home; keep a log.  -     amLODIPine (NORVASC) 2.5 MG tablet; Take 1 tablet (2.5 mg total) by mouth once daily.  Dispense: 30 tablet; Refill: 2  -     CBC Auto Differential; Future; Expected date: 05/19/2022  -     Basic Metabolic Panel; Future; Expected date: 05/19/2022  -     Reticulocytes; Future; Expected date: 05/19/2022  -     T4, Free; Future; Expected date: 05/19/2022  -     TSH; Future; Expected date: 05/19/2022    Mixed hyperlipidemia; Maintain low fat high fiber diet, exercise regularly. Weight reduction where indicated. Cont atorvastatin    Valvular heart disease: followed by dr Ward    S/P coronary artery stent placement: CAD has been stable without any chest pain, shortness of breath, or palpitations. Keep follow up with cardiologist as directed.     Iron deficiency anemia, unspecified iron deficiency anemia type; cont Renocaps one a day  -     Occult blood x 1, stool; Future; Expected date: 05/19/2022  -     Occult blood x 1, stool; Future; Expected date: 05/19/2022  -     Occult blood x 1, stool; Future; Expected date:  05/19/2022  -     CBC Auto Differential; Future; Expected date: 05/19/2022  -     Iron and TIBC; Future; Expected date: 05/19/2022  -     Ferritin; Future; Expected date: 05/19/2022  -     T4, Free; Future; Expected date: 05/19/2022  -     TSH; Future; Expected date: 05/19/2022    ROBBY (obstructive sleep apnea); CPAP intolerant    Stage 3a chronic kidney disease; keep hydrated adequately    Acute renal insufficiency: stop indapamide. Can double amlodipine if needed.   -     amLODIPine (NORVASC) 2.5 MG tablet; Take 1 tablet (2.5 mg total) by mouth once daily.  Dispense: 30 tablet; Refill: 2  -     CBC Auto Differential; Future; Expected date: 05/19/2022  -     Basic Metabolic Panel; Future; Expected date: 05/19/2022  -     Iron and TIBC; Future; Expected date: 05/19/2022  -     Ferritin; Future; Expected date: 05/19/2022  -     Reticulocytes; Future; Expected date: 05/19/2022    Anemia of chronic disease  -     Occult blood x 1, stool; Future; Expected date: 05/19/2022  -     Occult blood x 1, stool; Future; Expected date: 05/19/2022  -     Occult blood x 1, stool; Future; Expected date: 05/19/2022  -     CBC Auto Differential; Future; Expected date: 05/19/2022    Hypothyroidism, unspecified type; increase 75 to 88 mcg of levothyroxine a day.   -     T4, Free; Future; Expected date: 05/19/2022  -     TSH; Future; Expected date: 05/19/2022

## 2022-05-31 ENCOUNTER — PATIENT MESSAGE (OUTPATIENT)
Dept: ADMINISTRATIVE | Facility: HOSPITAL | Age: 77
End: 2022-05-31
Payer: MEDICARE

## 2022-07-05 ENCOUNTER — PATIENT MESSAGE (OUTPATIENT)
Dept: ADMINISTRATIVE | Facility: HOSPITAL | Age: 77
End: 2022-07-05
Payer: MEDICARE

## 2022-07-05 ENCOUNTER — PATIENT OUTREACH (OUTPATIENT)
Dept: ADMINISTRATIVE | Facility: HOSPITAL | Age: 77
End: 2022-07-05
Payer: MEDICARE

## 2022-07-05 NOTE — PROGRESS NOTES
2022 Care Everywhere updates requested and reviewed.  Immunizations reconciled. Media reports reviewed.  Duplicate HM overrides and  orders removed.  Overdue HM topic chart audit and/or requested.  Overdue lab testing linked to upcoming lab appointments if applies.    Health Maintenance Due   Topic Date Due    Shingles Vaccine (1 of 2) Never done    Eye Exam  2019    Pneumococcal Vaccines (Age 65+) (2 - PPSV23 or PCV20) 09/10/2019    Foot Exam  09/10/2019    COVID-19 Vaccine (4 - Booster for Pfizer series) 2021    Diabetes Urine Screening  2022

## 2022-07-05 NOTE — LETTER
July 12, 2022    Jadile Rossi  [address] [place] [address]             [facility]  [address]  [facility] [address]  Phone: [phone] Dear Michael [facility] is committed to your overall health.  To help you get the most out of each of your visits, we will review your information to make sure you are up to date on all of your recommended tests and/or procedures.       Eliud Chandler MD  has found that your chart shows you may be due for :         Health Maintenance Due   Topic    Shingles Vaccine     Eye Exam     Pneumococcal Vaccines    Foot Exam     COVID-19 Vaccine (4 - Booster for Pfizer series)         If you have had any of the above done at another facility, please bring the records or information with you so that your record at [facility] will be complete.  If you would like to schedule any of these test, please call [phone] or send a message via [email or web address].        If you are currently taking medication, please bring it with you to your appointment for review.           Thank you for letting us care for you,        Eliud Chandler MD and your [facility] Team

## 2022-07-13 DIAGNOSIS — E11.9 TYPE 2 DIABETES MELLITUS WITHOUT COMPLICATION, UNSPECIFIED WHETHER LONG TERM INSULIN USE: ICD-10-CM

## 2022-07-18 ENCOUNTER — PATIENT MESSAGE (OUTPATIENT)
Dept: ADMINISTRATIVE | Facility: HOSPITAL | Age: 77
End: 2022-07-18
Payer: MEDICARE

## 2022-07-18 ENCOUNTER — PATIENT OUTREACH (OUTPATIENT)
Dept: ADMINISTRATIVE | Facility: HOSPITAL | Age: 77
End: 2022-07-18
Payer: MEDICARE

## 2022-07-18 NOTE — LETTER
AUTHORIZATION FOR RELEASE OF   CONFIDENTIAL INFORMATION    Dear Herber FRANCO MD,    We are seeing Jadiel Rossi, date of birth 1945, in the clinic at [facility]. Eliud Chandler MD is the patient's PCP. Jadiel Rossi has an outstanding lab/procedure at the time we reviewed his chart. In order to help keep his health information updated, he has authorized us to request the following medical record(s):        (  )  MAMMOGRAM                                      (  )  COLONOSCOPY      (  )  PAP SMEAR                                          (  )  OUTSIDE LAB RESULTS     (  )  DEXA SCAN                                          (X)  DIABETIC EYE EXAM            (  )  FOOT EXAM                                          (  )  ENTIRE RECORD     (  )  OUTSIDE IMMUNIZATIONS                 (  )  _______________         Please fax records to [facility], Ronald Kluchin, MD, [phone]    If you have any questions, please contact Neeraj Urena LPN Care Coordinator  at [phone].              Patient Name: Jadiel Rossi  : 1945  Patient Phone #: [phone]

## 2022-07-26 DIAGNOSIS — N28.9 ACUTE RENAL INSUFFICIENCY: ICD-10-CM

## 2022-07-26 DIAGNOSIS — I10 ESSENTIAL HYPERTENSION: ICD-10-CM

## 2022-07-26 NOTE — TELEPHONE ENCOUNTER
No new care gaps identified.  [facility] Embedded Care Gaps. Reference number: [id number]. 7/26/2022   8:04:33 AM GABINOT

## 2022-07-27 NOTE — TELEPHONE ENCOUNTER
Refill Routing Note   Medication(s) are not appropriate for processing by [facility] for the following reason(s):      - Medication is a new start (<3 months)    [facility] action(s):  Defer          Medication reconciliation completed: No     Appointments  past 12m or future 3m with PCP    Date Provider   Last Visit   5/19/2022 Eliud Chandler MD   Next Visit   8/25/2022 Eliud Chandler MD   ED visits in past 90 days: 0        Note composed:9:08 AM 07/27/2022

## 2022-07-29 RX ORDER — AMLODIPINE BESYLATE 2.5 MG/1
TABLET ORAL
Qty: 30 TABLET | Refills: 2 | Status: SHIPPED | OUTPATIENT
Start: 2022-07-29 | End: 2022-08-25

## 2022-08-23 ENCOUNTER — LAB VISIT (OUTPATIENT)
Dept: LAB | Facility: HOSPITAL | Age: 77
End: 2022-08-23
Attending: INTERNAL MEDICINE
Payer: MEDICARE

## 2022-08-23 DIAGNOSIS — D63.8 ANEMIA OF CHRONIC DISEASE: ICD-10-CM

## 2022-08-23 DIAGNOSIS — E03.9 HYPOTHYROIDISM, UNSPECIFIED TYPE: ICD-10-CM

## 2022-08-23 DIAGNOSIS — D50.9 IRON DEFICIENCY ANEMIA, UNSPECIFIED IRON DEFICIENCY ANEMIA TYPE: ICD-10-CM

## 2022-08-23 DIAGNOSIS — N28.9 ACUTE RENAL INSUFFICIENCY: ICD-10-CM

## 2022-08-23 DIAGNOSIS — I10 ESSENTIAL HYPERTENSION: ICD-10-CM

## 2022-08-23 LAB
ANION GAP SERPL CALC-SCNC: 9 MMOL/L (ref 8–16)
BASOPHILS # BLD AUTO: 0.05 K/UL (ref 0–0.2)
BASOPHILS NFR BLD: 0.7 % (ref 0–1.9)
BUN SERPL-MCNC: 27 MG/DL (ref 8–23)
CALCIUM SERPL-MCNC: 9.7 MG/DL (ref 8.7–10.5)
CHLORIDE SERPL-SCNC: 100 MMOL/L (ref 95–110)
CO2 SERPL-SCNC: 29 MMOL/L (ref 23–29)
CREAT SERPL-MCNC: 1.3 MG/DL (ref 0.5–1.4)
DIFFERENTIAL METHOD: ABNORMAL
EOSINOPHIL # BLD AUTO: 0.5 K/UL (ref 0–0.5)
EOSINOPHIL NFR BLD: 6.3 % (ref 0–8)
ERYTHROCYTE [DISTWIDTH] IN BLOOD BY AUTOMATED COUNT: 12.7 % (ref 11.5–14.5)
EST. GFR  (NO RACE VARIABLE): 57.3 ML/MIN/1.73 M^2
FERRITIN SERPL-MCNC: 446 NG/ML (ref 20–300)
GLUCOSE SERPL-MCNC: 107 MG/DL (ref 70–110)
HCT VFR BLD AUTO: 33.8 % (ref 40–54)
HGB BLD-MCNC: 10.9 G/DL (ref 14–18)
IMM GRANULOCYTES # BLD AUTO: 0.03 K/UL (ref 0–0.04)
IMM GRANULOCYTES NFR BLD AUTO: 0.4 % (ref 0–0.5)
IRON SERPL-MCNC: 76 UG/DL (ref 45–160)
LYMPHOCYTES # BLD AUTO: 1.4 K/UL (ref 1–4.8)
LYMPHOCYTES NFR BLD: 18.5 % (ref 18–48)
MCH RBC QN AUTO: 31.8 PG (ref 27–31)
MCHC RBC AUTO-ENTMCNC: 32.2 G/DL (ref 32–36)
MCV RBC AUTO: 99 FL (ref 82–98)
MONOCYTES # BLD AUTO: 0.9 K/UL (ref 0.3–1)
MONOCYTES NFR BLD: 11.8 % (ref 4–15)
NEUTROPHILS # BLD AUTO: 4.8 K/UL (ref 1.8–7.7)
NEUTROPHILS NFR BLD: 62.3 % (ref 38–73)
NRBC BLD-RTO: 0 /100 WBC
PLATELET # BLD AUTO: 232 K/UL (ref 150–450)
PMV BLD AUTO: 10.2 FL (ref 9.2–12.9)
POTASSIUM SERPL-SCNC: 4.3 MMOL/L (ref 3.5–5.1)
RBC # BLD AUTO: 3.43 M/UL (ref 4.6–6.2)
RETICS/RBC NFR AUTO: 1.5 % (ref 0.4–2)
SATURATED IRON: 24 % (ref 20–50)
SODIUM SERPL-SCNC: 138 MMOL/L (ref 136–145)
T4 FREE SERPL-MCNC: 0.99 NG/DL (ref 0.71–1.51)
TOTAL IRON BINDING CAPACITY: 318 UG/DL (ref 250–450)
TRANSFERRIN SERPL-MCNC: 215 MG/DL (ref 200–375)
TSH SERPL DL<=0.005 MIU/L-ACNC: 1.74 UIU/ML (ref 0.4–4)
WBC # BLD AUTO: 7.64 K/UL (ref 3.9–12.7)

## 2022-08-23 PROCEDURE — 82728 ASSAY OF FERRITIN: CPT | Performed by: INTERNAL MEDICINE

## 2022-08-23 PROCEDURE — 85025 COMPLETE CBC W/AUTO DIFF WBC: CPT | Performed by: INTERNAL MEDICINE

## 2022-08-23 PROCEDURE — 85045 AUTOMATED RETICULOCYTE COUNT: CPT | Performed by: INTERNAL MEDICINE

## 2022-08-23 PROCEDURE — 84439 ASSAY OF FREE THYROXINE: CPT | Performed by: INTERNAL MEDICINE

## 2022-08-23 PROCEDURE — 84466 ASSAY OF TRANSFERRIN: CPT | Performed by: INTERNAL MEDICINE

## 2022-08-23 PROCEDURE — 36415 COLL VENOUS BLD VENIPUNCTURE: CPT | Mod: PN | Performed by: INTERNAL MEDICINE

## 2022-08-23 PROCEDURE — 84443 ASSAY THYROID STIM HORMONE: CPT | Performed by: INTERNAL MEDICINE

## 2022-08-23 PROCEDURE — 80048 BASIC METABOLIC PNL TOTAL CA: CPT | Performed by: INTERNAL MEDICINE

## 2022-08-25 ENCOUNTER — OFFICE VISIT (OUTPATIENT)
Dept: FAMILY MEDICINE | Facility: CLINIC | Age: 77
End: 2022-08-25
Payer: MEDICARE

## 2022-08-25 ENCOUNTER — LAB VISIT (OUTPATIENT)
Dept: LAB | Facility: HOSPITAL | Age: 77
End: 2022-08-25
Attending: INTERNAL MEDICINE
Payer: MEDICARE

## 2022-08-25 VITALS
HEART RATE: 62 BPM | SYSTOLIC BLOOD PRESSURE: 160 MMHG | DIASTOLIC BLOOD PRESSURE: 82 MMHG | WEIGHT: 193.81 LBS | HEIGHT: 70 IN | OXYGEN SATURATION: 95 % | BODY MASS INDEX: 27.75 KG/M2

## 2022-08-25 DIAGNOSIS — I38 VALVULAR HEART DISEASE: ICD-10-CM

## 2022-08-25 DIAGNOSIS — Z01.89 ENCOUNTER FOR LABORATORY TEST: ICD-10-CM

## 2022-08-25 DIAGNOSIS — D63.8 ANEMIA OF CHRONIC DISEASE: ICD-10-CM

## 2022-08-25 DIAGNOSIS — G47.33 OSA (OBSTRUCTIVE SLEEP APNEA): ICD-10-CM

## 2022-08-25 DIAGNOSIS — N18.31 STAGE 3A CHRONIC KIDNEY DISEASE: ICD-10-CM

## 2022-08-25 DIAGNOSIS — D50.9 IRON DEFICIENCY ANEMIA, UNSPECIFIED IRON DEFICIENCY ANEMIA TYPE: ICD-10-CM

## 2022-08-25 DIAGNOSIS — I10 ESSENTIAL HYPERTENSION: Primary | ICD-10-CM

## 2022-08-25 DIAGNOSIS — N28.9 ACUTE RENAL INSUFFICIENCY: ICD-10-CM

## 2022-08-25 DIAGNOSIS — E11.69 TYPE 2 DIABETES MELLITUS WITH OTHER SPECIFIED COMPLICATION, WITHOUT LONG-TERM CURRENT USE OF INSULIN: ICD-10-CM

## 2022-08-25 DIAGNOSIS — Z95.5 S/P CORONARY ARTERY STENT PLACEMENT: ICD-10-CM

## 2022-08-25 DIAGNOSIS — E03.9 HYPOTHYROIDISM, UNSPECIFIED TYPE: ICD-10-CM

## 2022-08-25 DIAGNOSIS — E78.2 MIXED HYPERLIPIDEMIA: ICD-10-CM

## 2022-08-25 PROCEDURE — 99999 PR PBB SHADOW E&M-EST. PATIENT-LVL V: ICD-10-PCS | Mod: PBBFAC,,, | Performed by: INTERNAL MEDICINE

## 2022-08-25 PROCEDURE — 82272 OCCULT BLD FECES 1-3 TESTS: CPT | Mod: 91 | Performed by: INTERNAL MEDICINE

## 2022-08-25 PROCEDURE — 3077F PR MOST RECENT SYSTOLIC BLOOD PRESSURE >= 140 MM HG: ICD-10-PCS | Mod: CPTII,S$GLB,, | Performed by: INTERNAL MEDICINE

## 2022-08-25 PROCEDURE — 1126F PR PAIN SEVERITY QUANTIFIED, NO PAIN PRESENT: ICD-10-PCS | Mod: CPTII,S$GLB,, | Performed by: INTERNAL MEDICINE

## 2022-08-25 PROCEDURE — 3044F PR MOST RECENT HEMOGLOBIN A1C LEVEL <7.0%: ICD-10-PCS | Mod: CPTII,S$GLB,, | Performed by: INTERNAL MEDICINE

## 2022-08-25 PROCEDURE — 1101F PT FALLS ASSESS-DOCD LE1/YR: CPT | Mod: CPTII,S$GLB,, | Performed by: INTERNAL MEDICINE

## 2022-08-25 PROCEDURE — 3288F PR FALLS RISK ASSESSMENT DOCUMENTED: ICD-10-PCS | Mod: CPTII,S$GLB,, | Performed by: INTERNAL MEDICINE

## 2022-08-25 PROCEDURE — 1126F AMNT PAIN NOTED NONE PRSNT: CPT | Mod: CPTII,S$GLB,, | Performed by: INTERNAL MEDICINE

## 2022-08-25 PROCEDURE — 3079F DIAST BP 80-89 MM HG: CPT | Mod: CPTII,S$GLB,, | Performed by: INTERNAL MEDICINE

## 2022-08-25 PROCEDURE — 3079F PR MOST RECENT DIASTOLIC BLOOD PRESSURE 80-89 MM HG: ICD-10-PCS | Mod: CPTII,S$GLB,, | Performed by: INTERNAL MEDICINE

## 2022-08-25 PROCEDURE — 1159F PR MEDICATION LIST DOCUMENTED IN MEDICAL RECORD: ICD-10-PCS | Mod: CPTII,S$GLB,, | Performed by: INTERNAL MEDICINE

## 2022-08-25 PROCEDURE — 1159F MED LIST DOCD IN RCRD: CPT | Mod: CPTII,S$GLB,, | Performed by: INTERNAL MEDICINE

## 2022-08-25 PROCEDURE — 3077F SYST BP >= 140 MM HG: CPT | Mod: CPTII,S$GLB,, | Performed by: INTERNAL MEDICINE

## 2022-08-25 PROCEDURE — 99499 UNLISTED E&M SERVICE: CPT | Mod: S$GLB,,, | Performed by: INTERNAL MEDICINE

## 2022-08-25 PROCEDURE — 1160F PR REVIEW ALL MEDS BY PRESCRIBER/CLIN PHARMACIST DOCUMENTED: ICD-10-PCS | Mod: CPTII,S$GLB,, | Performed by: INTERNAL MEDICINE

## 2022-08-25 PROCEDURE — 99214 PR OFFICE/OUTPT VISIT, EST, LEVL IV, 30-39 MIN: ICD-10-PCS | Mod: S$GLB,,, | Performed by: INTERNAL MEDICINE

## 2022-08-25 PROCEDURE — 99499 RISK ADDL DX/OHS AUDIT: ICD-10-PCS | Mod: S$GLB,,, | Performed by: INTERNAL MEDICINE

## 2022-08-25 PROCEDURE — 3288F FALL RISK ASSESSMENT DOCD: CPT | Mod: CPTII,S$GLB,, | Performed by: INTERNAL MEDICINE

## 2022-08-25 PROCEDURE — 1101F PR PT FALLS ASSESS DOC 0-1 FALLS W/OUT INJ PAST YR: ICD-10-PCS | Mod: CPTII,S$GLB,, | Performed by: INTERNAL MEDICINE

## 2022-08-25 PROCEDURE — 3044F HG A1C LEVEL LT 7.0%: CPT | Mod: CPTII,S$GLB,, | Performed by: INTERNAL MEDICINE

## 2022-08-25 PROCEDURE — 99214 OFFICE O/P EST MOD 30 MIN: CPT | Mod: S$GLB,,, | Performed by: INTERNAL MEDICINE

## 2022-08-25 PROCEDURE — 99999 PR PBB SHADOW E&M-EST. PATIENT-LVL V: CPT | Mod: PBBFAC,,, | Performed by: INTERNAL MEDICINE

## 2022-08-25 PROCEDURE — 1160F RVW MEDS BY RX/DR IN RCRD: CPT | Mod: CPTII,S$GLB,, | Performed by: INTERNAL MEDICINE

## 2022-08-25 RX ORDER — EZETIMIBE 10 MG/1
TABLET ORAL
Qty: 30 TABLET | Refills: 2 | Status: SHIPPED | OUTPATIENT
Start: 2022-08-25 | End: 2023-01-06

## 2022-08-25 RX ORDER — AMLODIPINE BESYLATE 5 MG/1
5 TABLET ORAL DAILY
Qty: 90 TABLET | Refills: 1 | Status: SHIPPED | OUTPATIENT
Start: 2022-08-25 | End: 2023-03-14

## 2022-08-25 NOTE — PATIENT INSTRUCTIONS
Essential hypertension: Maintain < 2 Gm Na a day diet, and monitor BP at home; keep a log. Increase amlodipine to 5 mg a day. On losartan 100 mg a day  -     amLODIPine (NORVASC) 5 MG tablet; Take 1 tablet (5 mg total) by mouth once daily.  Dispense: 90 tablet; Refill: 1  -     Lipid Panel; Future; Expected date: 08/25/2022  -     CBC Auto Differential; Future; Expected date: 08/25/2022  -     Comprehensive Metabolic Panel; Future; Expected date: 08/25/2022    Mixed hyperlipidemia; add zetia 5 mg a day. Cont atorvastatin at 40 mg a day.   -     ezetimibe (ZETIA) 10 mg tablet; Take 1/2 of 10 mg po daily.  Dispense: 30 tablet; Refill: 2  -     Lipid Panel; Future; Expected date: 08/25/2022  -     Comprehensive Metabolic Panel; Future; Expected date: 08/25/2022    Valvular heart disease    S/P coronary artery stent placement; recent stent placed 6/29-6/30 dr Ward.     Iron deficiency anemia, unspecified iron deficiency anemia type; hg better at 10.9; ferritin 446.   -     CBC Auto Differential; Future; Expected date: 08/25/2022  -     Ferritin; Future; Expected date: 08/25/2022    ROBBY (obstructive sleep apnea); declines CPAP; cont attempts at weight reduction    Stage 3a chronic kidney disease; miki improvement off indapamide; GFR 57 from 44.9.   -     CBC Auto Differential; Future; Expected date: 08/25/2022  -     Comprehensive Metabolic Panel; Future; Expected date: 08/25/2022    Acute renal insufficiency; improved off indapamide. From 44.9 to 57.   -     Comprehensive Metabolic Panel; Future; Expected date: 08/25/2022  -     Hemoglobin A1C; Future; Expected date: 08/25/2022    Type 2 diabetes mellitus with other specified complication, without long-term current use of insulin; Maintain a low carb diet; monitor CBG's at home; keep a log for review.  -     Comprehensive Metabolic Panel; Future; Expected date: 08/25/2022  -     Hemoglobin A1C; Future; Expected date: 08/25/2022    Hypothyroidism, unspecified type; TSH  1.74; same thyroid dosing of levothyroxine 88 mcg a day

## 2022-08-25 NOTE — PROGRESS NOTES
Subjective:       Patient ID: Jadiel Rossi is a 76 y.o. male.    Chief Complaint: Follow-up (labs)    HPI:  Here today for reassessment as well as review of his lab work  Essential hypertension: Maintain < 2 Gm Na a day diet, and monitor BP at home; keep a log. Increase amlodipine to 5 mg a day. On losartan 100 mg a day.  Manual blood pressure 160/80.  Advised monitoring his blood pressure at home to sit for 5 minutes in the chair with his feet flat on the ground before taking his blood pressure; and keep a log for office review  -     amLODIPine (NORVASC) 5 MG tablet; Take 1 tablet (5 mg total) by mouth once daily.  Dispense: 90 tablet; Refill: 1  -     Lipid Panel; Future; Expected date: 08/25/2022  -     CBC Auto Differential; Future; Expected date: 08/25/2022  -     Comprehensive Metabolic Panel; Future; Expected date: 08/25/2022  Mixed hyperlipidemia; add zetia 5 mg a day. Cont atorvastatin at 40 mg a day.  Lipid profile 06/27/2022:  Cholesterol 170/triglyceride 167/HDL 47/LDL 94 with LDL goal less than 60.  Will add Zetia half a 10 mg tablet daily.  -     ezetimibe (ZETIA) 10 mg tablet; Take 1/2 of 10 mg po daily.  Dispense: 30 tablet; Refill: 2  -     Lipid Panel; Future; Expected date: 08/25/2022  -     Comprehensive Metabolic Panel; Future; Expected date: 08/25/2022  Valvular heart disease  S/P coronary artery stent placement; recent stent placed 6/29-6/30 Dr Ward.   Iron deficiency anemia, unspecified iron deficiency anemia type; Hg better at 10.9; ferritin 446.  Serum iron 76 and TIBC 318 saturating 924 and normal; patient is also now borderline macrocytic at 99  -     CBC Auto Differential; Future; Expected date: 08/25/2022  -     Ferritin; Future; Expected date: 08/25/2022  ROBBY (obstructive sleep apnea); declines CPAP; cont attempts at weight reduction  Stage 3a chronic kidney disease; miki improvement off indapamide; GFR 57 from 44.9.  Hemoglobin has improved from 10.6-10.9 with MCV 99  -     CBC  Auto Differential; Future; Expected date: 08/25/2022  -     Comprehensive Metabolic Panel; Future; Expected date: 08/25/2022  Acute renal insufficiency; improved off indapamide. From 44.9 to 57.   -     Comprehensive Metabolic Panel; Future; Expected date: 08/25/2022  -     Hemoglobin A1C; Future; Expected date: 08/25/2022  Type 2 diabetes mellitus with other specified complication, without long-term current use of insulin; Maintain a low carb diet; monitor CBG's at home; keep a log for review.  Fasting blood sugar 107; 5/18 A1c 5.9.  06/27/2022 at lab work from lab core A1c is 6.0 hemoglobin is 11.7  -     Comprehensive Metabolic Panel; Future; Expected date: 08/25/2022  -     Hemoglobin A1C; Future; Expected date: 08/25/2022  Hypothyroidism, unspecified type; TSH 1.74; same thyroid dosing of levothyroxine 88 mcg a day.  Free T4 is 0.99  Encounter for lab test: Labs discussed and reviewed with patient at length in ordered for follow-up    Review of Systems   Constitutional: Negative for appetite change and unexpected weight change.   HENT: Negative for congestion, postnasal drip, rhinorrhea and sinus pressure.         Denies seasonal allergies, or perennial allergies   Eyes: Negative for discharge and itching.   Respiratory: Negative for cough, chest tightness, shortness of breath and wheezing.    Cardiovascular: Negative for chest pain, palpitations and leg swelling.   Gastrointestinal: Negative for abdominal pain, blood in stool, constipation, diarrhea, nausea and vomiting.   Endocrine: Negative for polydipsia, polyphagia and polyuria.   Genitourinary: Negative for dysuria and hematuria.   Musculoskeletal: Negative for arthralgias and myalgias.   Skin: Negative for rash.   Allergic/Immunologic: Negative for environmental allergies and food allergies.   Neurological: Negative for tremors, seizures and headaches.   Hematological: Negative for adenopathy. Does not bruise/bleed easily.   Psychiatric/Behavioral:  "Negative for dysphoric mood. The patient is not nervous/anxious.         Denies anxiety or depression.      Objective:        Vitals:    08/25/22 1446 08/25/22 1449 08/25/22 1557   BP: (!) 166/80 (!) 160/80 (!) 160/82   Pulse: 62     SpO2: 95%     Weight: 87.9 kg (193 lb 12.6 oz)     Height: 5' 10" (1.778 m)         BMI Readings from Last 3 Encounters:   08/25/22 27.81 kg/m²   08/24/22 28.25 kg/m²   05/19/22 28.36 kg/m²        Wt Readings from Last 3 Encounters:   08/25/22 1446 87.9 kg (193 lb 12.6 oz)   08/24/22 1303 89.3 kg (196 lb 13.9 oz)   05/19/22 1635 89.7 kg (197 lb 10.3 oz)        BP Readings from Last 3 Encounters:   08/25/22 (!) 160/82   08/24/22 (!) 164/78   05/19/22 138/74        There are no preventive care reminders to display for this patient.     Health Maintenance Due   Topic Date Due    Shingles Vaccine (1 of 2) Never done    Pneumococcal Vaccines (Age 65+) (2 - PPSV23 or PCV20) 09/10/2019    Foot Exam  09/10/2019    COVID-19 Vaccine (4 - Booster for Pfizer series) 12/19/2021    Diabetes Urine Screening  08/06/2022         Past Medical History:   Diagnosis Date    Anemia     Anxiety     Atrial fibrillation     admitted to [facility]/18 for AF w RVR; no cardioversion needed; Dr Mendes EPS. Slowed down on its own and back to NSR; placed on eliquis.     Carotid artery plaque     bilateral    Diabetes mellitus, type 2     Essential hypertension     Hypertension     Hypothyroidism     Liver dysfunction     Methylenetetrahydrofolate reductase (MTHFR) gene mutation     Mixed hyperlipidemia     Obesity     ROBBY (obstructive sleep apnea)     Proteinuria     Valvular heart disease        Past Surgical History:   Procedure Laterality Date    APPENDECTOMY  [id number]    COLONOSCOPY N/A 5/1/2019    Procedure: COLONOSCOPY;  Surgeon: Jimenez Solomon Jr., MD;  Location: [facility];  Service: Endoscopy;  Laterality: N/A;    CORONARY STENT PLACEMENT  04/25/2017    LAD by Dr Ward    ESOPHAGOGASTRODUODENOSCOPY N/A " 2019    Procedure: EGD (ESOPHAGOGASTRODUODENOSCOPY);  Surgeon: Jimenez Solomon Jr., MD;  Location: [facility];  Service: Endoscopy;  Laterality: N/A;       Social History     Tobacco Use    Smoking status: Never Smoker    Smokeless tobacco: Never Used   Substance Use Topics    Alcohol use: Yes     Alcohol/week: 16.0 standard drinks     Types: 8 Glasses of wine, 8 Shots of liquor per week    Drug use: No       Family History   Problem Relation Age of Onset    Cancer Mother     Heart disease Mother     Heart disease Father     Arthritis Father     Hypertension Father     Cancer Sister         breast cancer    Diabetes Sister     Asthma Brother     COPD Brother     Diabetes Brother     Stroke Brother     Cancer Sister         leukemia    Early death Sister          at 64 of leukemia    Colon cancer Neg Hx     Crohn's disease Neg Hx     Esophageal cancer Neg Hx     Ulcerative colitis Neg Hx     Stomach cancer Neg Hx        Review of patient's allergies indicates:   Allergen Reactions    Multaq [dronedarone]        Current Outpatient Medications on File Prior to Visit   Medication Sig Dispense Refill    albuterol (PROVENTIL/VENTOLIN HFA) 90 mcg/actuation inhaler INHALE TWO PUFFS INTO THE LUNGS EVERY 6 HOURS AS NEEDED FOR WHEEZING 54 g 3    atorvastatin (LIPITOR) 40 MG tablet TAKE 1 TABLET(40 MG) BY MOUTH EVERY DAY 90 tablet 1    blood sugar diagnostic Strp To check BG once daily before a meal, to use with insurance preferred meter 100 strip 3    budesonide-formoterol 80-4.5 mcg (SYMBICORT) 80-4.5 mcg/actuation HFAA Controller; 2 puffs 2x a day; please provide generic. 10.2 g 2    clopidogreL (PLAVIX) 75 mg tablet Take 75 mg by mouth once daily.      doxycycline (VIBRA-TABS) 100 MG tablet Take 1 tablet (100 mg total) by mouth 2 (two) times daily. for 5 days 10 tablet 0    ELIQUIS 5 mg Tab Take 1 tablet (5 mg total) by mouth 2 (two) times daily. 60 tablet 3    lancets Misc To check BG once daily before a meal, to  use with insurance preferred meter 100 each 3    levothyroxine (SYNTHROID) 88 MCG tablet Take 1 tablet (88 mcg total) by mouth before breakfast. 30 tablet 2    losartan (COZAAR) 100 MG tablet Take 100 mg by mouth once daily.      metFORMIN (GLUCOPHAGE) 500 MG tablet Take 1 tablet (500 mg total) by mouth 2 (two) times daily. 180 tablet 1    multivitamin (THERAGRAN) per tablet Take 1 tablet by mouth once daily.      mupirocin (BACTROBAN) 2 % ointment Apply topically 3 (three) times daily. 22 g 0    nitroGLYCERIN (NITROSTAT) 0.4 MG SL tablet DISSOLVE ONE T SUBLINGUALLY Q 5 MINUTES X 3 DOSES PRN FOR CHEST PAIN  0    sotaloL (BETAPACE) 80 MG tablet       SPIRIVA WITH HANDIHALER 18 mcg inhalation capsule as needed.       [DISCONTINUED] amLODIPine (NORVASC) 2.5 MG tablet TAKE 1 TABLET(2.5 MG) BY MOUTH EVERY DAY 30 tablet 2    albuterol-ipratropium (DUO-NEB) 2.5 mg-0.5 mg/3 mL nebulizer solution Take 3 mLs by nebulization every 6 (six) hours as needed for Wheezing. Rescue 120 vial 3    budesonide (PULMICORT) 0.5 mg/2 mL nebulizer solution Take 2 mLs (0.5 mg total) by nebulization 2 (two) times daily. Controller 120 mL 3     No current facility-administered medications on file prior to visit.       Physical Exam  Vitals reviewed.   Constitutional:       Appearance: He is well-developed.   HENT:      Head: Normocephalic and atraumatic.   Neck:      Thyroid: No thyromegaly.   Cardiovascular:      Rate and Rhythm: Normal rate and regular rhythm.      Heart sounds: Normal heart sounds. No murmur heard.    No gallop.      Comments: MARCO ANTONIO 2/6 aortic, pulm, LLSB.   Pulmonary:      Effort: Pulmonary effort is normal. No respiratory distress.      Breath sounds: Normal breath sounds. No wheezing or rales.   Abdominal:      General: Bowel sounds are normal. There is no distension.      Palpations: Abdomen is soft.      Tenderness: There is no abdominal tenderness. There is no guarding or rebound.   Musculoskeletal:         General:  Normal range of motion.      Cervical back: Normal range of motion and neck supple.      Right lower leg: No edema.      Left lower leg: No edema.   Lymphadenopathy:      Cervical: No cervical adenopathy.   Skin:     Findings: No rash.      Comments: R forearm w bandages and cling wrap undisturbed; just seen in ER; starting to heal. No stitches placed. Doxy 5 d course; fell petting dog.    Neurological:      Mental Status: He is alert and oriented to person, place, and time.      Comments: Moves all 4 extremities fine.   Psychiatric:         Behavior: Behavior normal.         Thought Content: Thought content normal.         Assessment:       1. Essential hypertension    2. Mixed hyperlipidemia    3. Valvular heart disease    4. S/P coronary artery stent placement    5. Iron deficiency anemia, unspecified iron deficiency anemia type    6. ROBBY (obstructive sleep apnea)    7. Stage 3a chronic kidney disease    8. Acute renal insufficiency    9. Type 2 diabetes mellitus with other specified complication, without long-term current use of insulin    10. Hypothyroidism, unspecified type        Plan:       Essential hypertension: Maintain < 2 Gm Na a day diet, and monitor BP at home; keep a log. Increase amlodipine to 5 mg a day. On losartan 100 mg a day.  Manual blood pressure 160/80.  Advised monitoring his blood pressure at home to sit for 5 minutes in the chair with his feet flat on the ground before taking his blood pressure; and keep a log for office review  -     amLODIPine (NORVASC) 5 MG tablet; Take 1 tablet (5 mg total) by mouth once daily.  Dispense: 90 tablet; Refill: 1  -     Lipid Panel; Future; Expected date: 08/25/2022  -     CBC Auto Differential; Future; Expected date: 08/25/2022  -     Comprehensive Metabolic Panel; Future; Expected date: 08/25/2022    Mixed hyperlipidemia; add zetia 5 mg a day. Cont atorvastatin at 40 mg a day.   -     ezetimibe (ZETIA) 10 mg tablet; Take 1/2 of 10 mg po daily.   Dispense: 30 tablet; Refill: 2  -     Lipid Panel; Future; Expected date: 08/25/2022  -     Comprehensive Metabolic Panel; Future; Expected date: 08/25/2022    Valvular heart disease    S/P coronary artery stent placement; recent stent placed 6/29-6/30 dr Ward.     Iron deficiency anemia, unspecified iron deficiency anemia type; hg better at 10.9; ferritin 446.   -     CBC Auto Differential; Future; Expected date: 08/25/2022  -     Ferritin; Future; Expected date: 08/25/2022    ROBBY (obstructive sleep apnea); declines CPAP; cont attempts at weight reduction    Stage 3a chronic kidney disease; miki improvement off indapamide; GFR 57 from 44.9.   -     CBC Auto Differential; Future; Expected date: 08/25/2022  -     Comprehensive Metabolic Panel; Future; Expected date: 08/25/2022    Acute renal insufficiency; improved off indapamide. From 44.9 to 57.   -     Comprehensive Metabolic Panel; Future; Expected date: 08/25/2022  -     Hemoglobin A1C; Future; Expected date: 08/25/2022    Type 2 diabetes mellitus with other specified complication, without long-term current use of insulin; Maintain a low carb diet; monitor CBG's at home; keep a log for review.  -     Comprehensive Metabolic Panel; Future; Expected date: 08/25/2022  -     Hemoglobin A1C; Future; Expected date: 08/25/2022    Hypothyroidism, unspecified type; TSH 1.74; same thyroid dosing of levothyroxine 88 mcg a day

## 2022-08-26 LAB
OB PNL STL: NEGATIVE

## 2022-09-30 NOTE — TELEPHONE ENCOUNTER
Refill Routing Note   Medication(s) are not appropriate for processing by [facility] for the following reason(s):      - Drug-Disease Interaction (metFORMIN and Acute hypoxemic respiratory failure)    [facility] action(s):  Defer Medication-related problems identified: Drug-disease interaction        Medication reconciliation completed: No     Appointments  past 12m or future 3m with PCP    Date Provider   Last Visit   8/25/2022 Eliud Chandler MD   Next Visit   11/18/2022 Eliud Chandler MD   ED visits in past 90 days: 1        Note composed:11:37 AM 09/30/2022

## 2022-09-30 NOTE — TELEPHONE ENCOUNTER
No new care gaps identified.  [facility] Embedded Care Gaps. Reference number: [id number]. 9/30/2022   3:28:37 AM GABINOT

## 2022-10-04 RX ORDER — METFORMIN HYDROCHLORIDE 500 MG/1
TABLET ORAL
Qty: 180 TABLET | Refills: 1 | Status: SHIPPED | OUTPATIENT
Start: 2022-10-04 | End: 2023-04-05

## 2022-10-13 DIAGNOSIS — E11.9 TYPE 2 DIABETES MELLITUS WITHOUT COMPLICATION: ICD-10-CM

## 2022-10-17 ENCOUNTER — PATIENT MESSAGE (OUTPATIENT)
Dept: ADMINISTRATIVE | Facility: HOSPITAL | Age: 77
End: 2022-10-17
Payer: MEDICARE

## 2022-11-14 ENCOUNTER — LAB VISIT (OUTPATIENT)
Dept: LAB | Facility: HOSPITAL | Age: 77
End: 2022-11-14
Attending: INTERNAL MEDICINE
Payer: MEDICARE

## 2022-11-14 DIAGNOSIS — E78.2 MIXED HYPERLIPIDEMIA: ICD-10-CM

## 2022-11-14 DIAGNOSIS — N18.31 STAGE 3A CHRONIC KIDNEY DISEASE: ICD-10-CM

## 2022-11-14 DIAGNOSIS — D50.9 IRON DEFICIENCY ANEMIA, UNSPECIFIED IRON DEFICIENCY ANEMIA TYPE: ICD-10-CM

## 2022-11-14 DIAGNOSIS — I10 ESSENTIAL HYPERTENSION: ICD-10-CM

## 2022-11-14 DIAGNOSIS — N28.9 ACUTE RENAL INSUFFICIENCY: ICD-10-CM

## 2022-11-14 DIAGNOSIS — E11.69 TYPE 2 DIABETES MELLITUS WITH OTHER SPECIFIED COMPLICATION, WITHOUT LONG-TERM CURRENT USE OF INSULIN: ICD-10-CM

## 2022-11-14 LAB
ALBUMIN SERPL BCP-MCNC: 3.9 G/DL (ref 3.5–5.2)
ALP SERPL-CCNC: 58 U/L (ref 55–135)
ALT SERPL W/O P-5'-P-CCNC: 19 U/L (ref 10–44)
ANION GAP SERPL CALC-SCNC: 11 MMOL/L (ref 8–16)
AST SERPL-CCNC: 18 U/L (ref 10–40)
BASOPHILS # BLD AUTO: 0.05 K/UL (ref 0–0.2)
BASOPHILS NFR BLD: 0.6 % (ref 0–1.9)
BILIRUB SERPL-MCNC: 0.7 MG/DL (ref 0.1–1)
BUN SERPL-MCNC: 28 MG/DL (ref 8–23)
CALCIUM SERPL-MCNC: 9.9 MG/DL (ref 8.7–10.5)
CHLORIDE SERPL-SCNC: 100 MMOL/L (ref 95–110)
CHOLEST SERPL-MCNC: 132 MG/DL (ref 120–199)
CHOLEST/HDLC SERPL: 3 {RATIO} (ref 2–5)
CO2 SERPL-SCNC: 28 MMOL/L (ref 23–29)
CREAT SERPL-MCNC: 1.4 MG/DL (ref 0.5–1.4)
DIFFERENTIAL METHOD: ABNORMAL
EOSINOPHIL # BLD AUTO: 0.7 K/UL (ref 0–0.5)
EOSINOPHIL NFR BLD: 7.8 % (ref 0–8)
ERYTHROCYTE [DISTWIDTH] IN BLOOD BY AUTOMATED COUNT: 12.5 % (ref 11.5–14.5)
EST. GFR  (NO RACE VARIABLE): 52.4 ML/MIN/1.73 M^2
ESTIMATED AVG GLUCOSE: 114 MG/DL (ref 68–131)
GLUCOSE SERPL-MCNC: 102 MG/DL (ref 70–110)
HBA1C MFR BLD: 5.6 % (ref 4–5.6)
HCT VFR BLD AUTO: 33.2 % (ref 40–54)
HDLC SERPL-MCNC: 44 MG/DL (ref 40–75)
HDLC SERPL: 33.3 % (ref 20–50)
HGB BLD-MCNC: 10.7 G/DL (ref 14–18)
IMM GRANULOCYTES # BLD AUTO: 0.03 K/UL (ref 0–0.04)
IMM GRANULOCYTES NFR BLD AUTO: 0.3 % (ref 0–0.5)
LDLC SERPL CALC-MCNC: 65.6 MG/DL (ref 63–159)
LYMPHOCYTES # BLD AUTO: 2.1 K/UL (ref 1–4.8)
LYMPHOCYTES NFR BLD: 22.8 % (ref 18–48)
MCH RBC QN AUTO: 32 PG (ref 27–31)
MCHC RBC AUTO-ENTMCNC: 32.2 G/DL (ref 32–36)
MCV RBC AUTO: 99 FL (ref 82–98)
MONOCYTES # BLD AUTO: 0.9 K/UL (ref 0.3–1)
MONOCYTES NFR BLD: 9.6 % (ref 4–15)
NEUTROPHILS # BLD AUTO: 5.4 K/UL (ref 1.8–7.7)
NEUTROPHILS NFR BLD: 58.9 % (ref 38–73)
NONHDLC SERPL-MCNC: 88 MG/DL
NRBC BLD-RTO: 0 /100 WBC
PLATELET # BLD AUTO: 206 K/UL (ref 150–450)
PMV BLD AUTO: 10.5 FL (ref 9.2–12.9)
POTASSIUM SERPL-SCNC: 4.1 MMOL/L (ref 3.5–5.1)
PROT SERPL-MCNC: 7.2 G/DL (ref 6–8.4)
RBC # BLD AUTO: 3.34 M/UL (ref 4.6–6.2)
SODIUM SERPL-SCNC: 139 MMOL/L (ref 136–145)
TRIGL SERPL-MCNC: 112 MG/DL (ref 30–150)
WBC # BLD AUTO: 9.08 K/UL (ref 3.9–12.7)

## 2022-11-14 PROCEDURE — 80061 LIPID PANEL: CPT | Performed by: INTERNAL MEDICINE

## 2022-11-14 PROCEDURE — 80053 COMPREHEN METABOLIC PANEL: CPT | Performed by: INTERNAL MEDICINE

## 2022-11-14 PROCEDURE — 85025 COMPLETE CBC W/AUTO DIFF WBC: CPT | Performed by: INTERNAL MEDICINE

## 2022-11-14 PROCEDURE — 83036 HEMOGLOBIN GLYCOSYLATED A1C: CPT | Performed by: INTERNAL MEDICINE

## 2022-11-14 PROCEDURE — 82728 ASSAY OF FERRITIN: CPT | Performed by: INTERNAL MEDICINE

## 2022-11-14 PROCEDURE — 36415 COLL VENOUS BLD VENIPUNCTURE: CPT | Mod: PN | Performed by: INTERNAL MEDICINE

## 2022-11-15 LAB — FERRITIN SERPL-MCNC: 316 NG/ML (ref 20–300)

## 2022-11-18 ENCOUNTER — OFFICE VISIT (OUTPATIENT)
Dept: FAMILY MEDICINE | Facility: CLINIC | Age: 77
End: 2022-11-18
Payer: MEDICARE

## 2022-11-18 VITALS
BODY MASS INDEX: 27.77 KG/M2 | WEIGHT: 194 LBS | HEART RATE: 68 BPM | OXYGEN SATURATION: 95 % | HEIGHT: 70 IN | DIASTOLIC BLOOD PRESSURE: 60 MMHG | SYSTOLIC BLOOD PRESSURE: 160 MMHG

## 2022-11-18 DIAGNOSIS — D63.8 ANEMIA OF CHRONIC DISEASE: ICD-10-CM

## 2022-11-18 DIAGNOSIS — R59.0 MEDIASTINAL ADENOPATHY: ICD-10-CM

## 2022-11-18 DIAGNOSIS — J30.9 CHRONIC ALLERGIC RHINITIS: ICD-10-CM

## 2022-11-18 DIAGNOSIS — Z01.89 ENCOUNTER FOR LABORATORY TEST: ICD-10-CM

## 2022-11-18 DIAGNOSIS — D53.9 MACROCYTIC ANEMIA: ICD-10-CM

## 2022-11-18 DIAGNOSIS — Z78.9 INTOLERANCE OF CONTINUOUS POSITIVE AIRWAY PRESSURE (CPAP) VENTILATION: ICD-10-CM

## 2022-11-18 DIAGNOSIS — G47.33 OSA (OBSTRUCTIVE SLEEP APNEA): ICD-10-CM

## 2022-11-18 DIAGNOSIS — E78.2 MIXED HYPERLIPIDEMIA: ICD-10-CM

## 2022-11-18 DIAGNOSIS — E03.9 HYPOTHYROIDISM, UNSPECIFIED TYPE: ICD-10-CM

## 2022-11-18 DIAGNOSIS — I10 ESSENTIAL HYPERTENSION: Primary | ICD-10-CM

## 2022-11-18 DIAGNOSIS — N18.31 STAGE 3A CHRONIC KIDNEY DISEASE: ICD-10-CM

## 2022-11-18 DIAGNOSIS — E11.69 TYPE 2 DIABETES MELLITUS WITH OTHER SPECIFIED COMPLICATION, WITHOUT LONG-TERM CURRENT USE OF INSULIN: ICD-10-CM

## 2022-11-18 DIAGNOSIS — E78.5 DYSLIPIDEMIA: ICD-10-CM

## 2022-11-18 DIAGNOSIS — I35.0 NONRHEUMATIC AORTIC (VALVE) STENOSIS: ICD-10-CM

## 2022-11-18 DIAGNOSIS — J18.9 RECURRENT PNEUMONIA: ICD-10-CM

## 2022-11-18 DIAGNOSIS — Z15.89 MTHFR GENE MUTATION: ICD-10-CM

## 2022-11-18 DIAGNOSIS — I35.1 NONRHEUMATIC AORTIC (VALVE) INSUFFICIENCY: ICD-10-CM

## 2022-11-18 DIAGNOSIS — Z95.5 S/P CORONARY ARTERY STENT PLACEMENT: ICD-10-CM

## 2022-11-18 PROCEDURE — 1126F AMNT PAIN NOTED NONE PRSNT: CPT | Mod: CPTII,S$GLB,, | Performed by: INTERNAL MEDICINE

## 2022-11-18 PROCEDURE — 1159F PR MEDICATION LIST DOCUMENTED IN MEDICAL RECORD: ICD-10-PCS | Mod: CPTII,S$GLB,, | Performed by: INTERNAL MEDICINE

## 2022-11-18 PROCEDURE — 3044F HG A1C LEVEL LT 7.0%: CPT | Mod: CPTII,S$GLB,, | Performed by: INTERNAL MEDICINE

## 2022-11-18 PROCEDURE — 99214 PR OFFICE/OUTPT VISIT, EST, LEVL IV, 30-39 MIN: ICD-10-PCS | Mod: S$GLB,,, | Performed by: INTERNAL MEDICINE

## 2022-11-18 PROCEDURE — 3078F DIAST BP <80 MM HG: CPT | Mod: CPTII,S$GLB,, | Performed by: INTERNAL MEDICINE

## 2022-11-18 PROCEDURE — 99999 PR PBB SHADOW E&M-EST. PATIENT-LVL V: CPT | Mod: PBBFAC,,, | Performed by: INTERNAL MEDICINE

## 2022-11-18 PROCEDURE — 99499 UNLISTED E&M SERVICE: CPT | Mod: HCNC,S$GLB,, | Performed by: INTERNAL MEDICINE

## 2022-11-18 PROCEDURE — 3288F PR FALLS RISK ASSESSMENT DOCUMENTED: ICD-10-PCS | Mod: CPTII,S$GLB,, | Performed by: INTERNAL MEDICINE

## 2022-11-18 PROCEDURE — 99999 PR PBB SHADOW E&M-EST. PATIENT-LVL V: ICD-10-PCS | Mod: PBBFAC,,, | Performed by: INTERNAL MEDICINE

## 2022-11-18 PROCEDURE — 1101F PR PT FALLS ASSESS DOC 0-1 FALLS W/OUT INJ PAST YR: ICD-10-PCS | Mod: CPTII,S$GLB,, | Performed by: INTERNAL MEDICINE

## 2022-11-18 PROCEDURE — 3077F PR MOST RECENT SYSTOLIC BLOOD PRESSURE >= 140 MM HG: ICD-10-PCS | Mod: CPTII,S$GLB,, | Performed by: INTERNAL MEDICINE

## 2022-11-18 PROCEDURE — 1126F PR PAIN SEVERITY QUANTIFIED, NO PAIN PRESENT: ICD-10-PCS | Mod: CPTII,S$GLB,, | Performed by: INTERNAL MEDICINE

## 2022-11-18 PROCEDURE — 3078F PR MOST RECENT DIASTOLIC BLOOD PRESSURE < 80 MM HG: ICD-10-PCS | Mod: CPTII,S$GLB,, | Performed by: INTERNAL MEDICINE

## 2022-11-18 PROCEDURE — 3077F SYST BP >= 140 MM HG: CPT | Mod: CPTII,S$GLB,, | Performed by: INTERNAL MEDICINE

## 2022-11-18 PROCEDURE — 3044F PR MOST RECENT HEMOGLOBIN A1C LEVEL <7.0%: ICD-10-PCS | Mod: CPTII,S$GLB,, | Performed by: INTERNAL MEDICINE

## 2022-11-18 PROCEDURE — 1101F PT FALLS ASSESS-DOCD LE1/YR: CPT | Mod: CPTII,S$GLB,, | Performed by: INTERNAL MEDICINE

## 2022-11-18 PROCEDURE — 99214 OFFICE O/P EST MOD 30 MIN: CPT | Mod: S$GLB,,, | Performed by: INTERNAL MEDICINE

## 2022-11-18 PROCEDURE — 1159F MED LIST DOCD IN RCRD: CPT | Mod: CPTII,S$GLB,, | Performed by: INTERNAL MEDICINE

## 2022-11-18 PROCEDURE — 3288F FALL RISK ASSESSMENT DOCD: CPT | Mod: CPTII,S$GLB,, | Performed by: INTERNAL MEDICINE

## 2022-11-18 RX ORDER — DOXAZOSIN 1 MG/1
TABLET ORAL
Qty: 30 TABLET | Refills: 2 | Status: SHIPPED | OUTPATIENT
Start: 2022-11-18 | End: 2023-03-18

## 2022-11-18 NOTE — PATIENT INSTRUCTIONS
Essential hypertension: Maintain < 2 Gm Na a day diet, and monitor BP at home; keep a log. Wait 5 minutes after being seated before running. Add cardura 1 mg an afternoon between 4-6 pm; if needed after 1 week can increase to 2 mg a day; goal < 130/80. Cont amlodipine and losartan.   -     doxazosin (CARDURA) 1 MG tablet; Start at 1 mg po every 4-6 pm; after 1 week if needed can increase to 2 mg every 4-6 pm. Generic please.  Dispense: 30 tablet; Refill: 2    Mixed hyperlipidemia: Maintain low fat high fiber diet, exercise regularly. Weight reduction where indicated.  Cont atorvastatin and at 40 mg a day; zetia at 5 mg a day . LDL at 65.6,     Dyslipidemia; increase aerobic activity.     S/P coronary artery stent placement; CAD has been stable without any chest pain, shortness of breath, or palpitations. Keep follow up with cardiologist as directed.  Card is Dr RENITA Ward.     ROBBY (obstructive sleep apnea): CPAP intolerant. Cont attempts at weight reduction    Stage 3a chronic kidney disease; increase fluids to 6-8 glasses a day. GFR slightly worse at 52,4 from 57.3.  -     vitamin renal formula, B-complex-vitamin c-folic acid, (NEPHROCAP) 1 mg Cap; Take 1 capsule by mouth once daily.  Dispense: 90 capsule; Refill: 3  -     Comprehensive Metabolic Panel; Future; Expected date: 11/18/2022    Type 2 diabetes mellitus with other specified complication, without long-term current use of insulin; Maintain a low carb diet; monitor CBG's at home; keep a log for review.  HgA1C at 5.6 w .   -     Hemoglobin A1C; Future; Expected date: 11/18/2022  -     Comprehensive Metabolic Panel; Future; Expected date: 11/18/2022    Hypothyroidism, unspecified type: TSH w free T4 at f/u. TSH 1.74 on 8/23 w free T4 at 0.99.    Encounter for laboratory test: labs reviewed w pt and ordered for f/u.     Anemia of chronic disease  -     vitamin renal formula, B-complex-vitamin c-folic acid, (NEPHROCAP) 1 mg Cap; Take 1 capsule by mouth once  daily.  Dispense: 90 capsule; Refill: 3  -     Hemoglobin A1C; Future; Expected date: 11/18/2022  -     CBC Auto Differential; Future; Expected date: 11/18/2022  -     Vitamin B12; Future; Expected date: 11/18/2022  -     Folate; Future; Expected date: 11/18/2022    Macrocytic anemia  -     vitamin renal formula, B-complex-vitamin c-folic acid, (NEPHROCAP) 1 mg Cap; Take 1 capsule by mouth once daily.  Dispense: 90 capsule; Refill: 3  -     Hemoglobin A1C; Future; Expected date: 11/18/2022  -     CBC Auto Differential; Future; Expected date: 11/18/2022  -     Vitamin B12; Future; Expected date: 11/18/2022  -     Folate; Future; Expected date: 11/18/2022

## 2022-11-18 NOTE — PROGRESS NOTES
Subjective:       Patient ID: Jadiel Rossi is a 76 y.o. male.    Chief Complaint: Follow-up (Blood work)  Here for reassessment and go over his lab work.  Follow-up  Pertinent negatives include no abdominal pain, arthralgias, chest pain, congestion, coughing, headaches, myalgias, nausea, rash or vomiting.   Essential hypertension: Maintain < 2 Gm Na a day diet, and monitor BP at home; keep a log. Wait 5 minutes after being seated before running. Add cardura 1 mg an afternoon between 4-6 pm; if needed after 1 week can increase to 2 mg a day; goal < 130/80. Cont amlodipine and losartan.  Add doxazosin 1 mg every 4-6 p.m.; if needed after 7-10 days can increase to 2 mg dosing.  Blood pressure at home averaging 150-160/62-66.  Patient can benefit from sitting and waiting 4-5 minutes in the morning before running his blood pressure cuff.  To work on his diet and exercise closer. Add DASH diet as well.  -     doxazosin (CARDURA) 1 MG tablet; Start at 1 mg po every 4-6 pm; after 1 week if needed can increase to 2 mg every 4-6 pm. Generic please.  Dispense: 30 tablet; Refill: 2    ROBBY (obstructive sleep apnea): CPAP intolerant. Cont attempts at weight reduction    Intolerance of CPAP:  Continue attempts at weight reduction as above    Mixed hyperlipidemia: Maintain low fat high fiber diet, exercise regularly. Weight reduction where indicated.  Cont atorvastatin and at 40 mg a day; zetia at 5 mg a day . LDL at 65.6, lipid profile:  Cholesterol 132/triglyceride 112/HDL 44/LDL 65.6 previously was 72.0.    Dyslipidemia; increase aerobic activity.     S/P coronary artery stent placement; CAD has been stable without any chest pain, shortness of breath, or palpitations. Keep follow up with cardiologist as directed.  Card is Dr RENITA Ward.     MTHFR gene mutation:  To check homocystine levels with his labs for follow-up    Stage 3a chronic kidney disease; increase fluids to 6-8 glasses a day. GFR slightly worse at 52.4 from  57.3.  -     vitamin renal formula, B-complex-vitamin c-folic acid, (NEPHROCAP) 1 mg Cap; Take 1 capsule by mouth once daily.  Dispense: 90 capsule; Refill: 3  -     Comprehensive Metabolic Panel; Future; Expected date: 11/18/2022    Type 2 diabetes mellitus with other specified complication, without long-term current use of insulin; Maintain a low carb diet; monitor CBG's at home; keep a log for review.  HgA1C at 5.6 w .  Recent cataract surgery right side about 2 weeks ago then left 3 days ago.  Good results.  -     Hemoglobin A1C; Future; Expected date: 11/18/2022  -     Comprehensive Metabolic Panel; Future; Expected date: 11/18/2022    Hypothyroidism, unspecified type: TSH w free T4 at f/u. TSH 1.74 on 8/23 w free T4 at 0.99.    Encounter for laboratory test: labs reviewed w pt and ordered for f/u.     Anemia of chronic disease:  H&H 10.7/33.2 with MCV 99; slightly decreased from hemoglobin 10.9 on 08/23.  Nephrocaps 1 per day prescribed; ferritin level returned back minimally elevated at 316.  -     vitamin renal formula, B-complex-vitamin c-folic acid, (NEPHROCAP) 1 mg Cap; Take 1 capsule by mouth once daily.  Dispense: 90 capsule; Refill: 3  -     Hemoglobin A1C; Future; Expected date: 11/18/2022  -     CBC Auto Differential; Future; Expected date: 11/18/2022  -     Vitamin B12; Future; Expected date: 11/18/2022  -     Folate; Future; Expected date: 11/18/2022    Macrocytic anemia  -     vitamin renal formula, B-complex-vitamin c-folic acid, (NEPHROCAP) 1 mg Cap; Take 1 capsule by mouth once daily.  Dispense: 90 capsule; Refill: 3  -     Hemoglobin A1C; Future; Expected date: 11/18/2022  -     CBC Auto Differential; Future; Expected date: 11/18/2022  -     Vitamin B12; Future; Expected date: 11/18/2022  -     Folate; Future; Expected date: 11/18/2022    Chronic allergic rhinitis:  Mediastinal adenopathy:  History of recurrent pneumonia:  Has been doing well; followed by Pulmonary service.    "Kaz.Has been prescribed albuterol rescue inhaler if needed and Singulair.  Please see their notes.  IgG normal with IgG subclass 1 to 3 and 4 normal as well.  Keep follow-up with Pulmonary service as directed; see 6/21/21 CT of the chest    Nonrheumatic aortic valve stenosis and aortic valve insufficiency:  No complaints of any chest pain or shortness of breath palpitations or dizziness or lightheadedness.  Followed by his cardiologist Dr. BARTOLO Ward        Review of Systems   Constitutional:  Negative for appetite change and unexpected weight change.   HENT:  Negative for congestion, postnasal drip, rhinorrhea and sinus pressure.         Denies seasonal allergies, or perennial allergies   Eyes:  Negative for discharge and itching.   Respiratory:  Negative for cough, chest tightness, shortness of breath and wheezing.    Cardiovascular:  Negative for chest pain, palpitations and leg swelling.   Gastrointestinal:  Negative for abdominal pain, blood in stool, constipation, diarrhea, nausea and vomiting.   Endocrine: Negative for polydipsia, polyphagia and polyuria.   Genitourinary:  Negative for dysuria and hematuria.   Musculoskeletal:  Negative for arthralgias and myalgias.   Skin:  Negative for rash.   Allergic/Immunologic: Negative for environmental allergies and food allergies.   Neurological:  Negative for tremors, seizures and headaches.   Hematological:  Negative for adenopathy. Does not bruise/bleed easily.   Psychiatric/Behavioral:  Negative for dysphoric mood. The patient is not nervous/anxious.         Denies anxiety or depression.    Objective:        Vitals:    11/18/22 1554 11/18/22 1606   BP: (!) 160/70 (!) 160/60   Pulse: 68    SpO2: 95%    Weight: 88 kg (194 lb 0.1 oz)    Height: 5' 10" (1.778 m)        BMI Readings from Last 3 Encounters:   11/18/22 27.84 kg/m²   08/25/22 27.81 kg/m²   08/24/22 28.25 kg/m²        Wt Readings from Last 3 Encounters:   11/18/22 1554 88 kg (194 lb 0.1 oz)   08/25/22 " 1446 87.9 kg (193 lb 12.6 oz)   08/24/22 1303 89.3 kg (196 lb 13.9 oz)        BP Readings from Last 3 Encounters:   11/18/22 (!) 160/60   08/25/22 (!) 160/82   08/24/22 (!) 164/78        There are no preventive care reminders to display for this patient.     Health Maintenance Due   Topic Date Due    Shingles Vaccine (1 of 2) Never done    Pneumococcal Vaccines (Age 65+) (2 - PPSV23 if available, else PCV20) 09/10/2019    Foot Exam  09/10/2019    COVID-19 Vaccine (4 - Booster for Pfizer series) 10/14/2021    Diabetes Urine Screening  08/06/2022    Influenza Vaccine (1) 09/01/2022         Past Medical History:   Diagnosis Date    Anemia     Anxiety     Atrial fibrillation     admitted to [facility] 7/1/18 for AF w RVR; no cardioversion needed; Dr Mendes EPS. Slowed down on its own and back to NSR; placed on eliquis.     Carotid artery plaque     bilateral    Diabetes mellitus, type 2     Essential hypertension     Hypertension     Hypothyroidism     Liver dysfunction     Methylenetetrahydrofolate reductase (MTHFR) gene mutation     Mixed hyperlipidemia     Obesity     ROBBY (obstructive sleep apnea)     Proteinuria     Valvular heart disease        Past Surgical History:   Procedure Laterality Date    APPENDECTOMY  9929-1753    COLONOSCOPY N/A 5/1/2019    Procedure: COLONOSCOPY;  Surgeon: Jimenez Solomon Jr., MD;  Location: [facility];  Service: Endoscopy;  Laterality: N/A;    CORONARY STENT PLACEMENT  04/25/2017    LAD by Dr Ward    ESOPHAGOGASTRODUODENOSCOPY N/A 5/1/2019    Procedure: EGD (ESOPHAGOGASTRODUODENOSCOPY);  Surgeon: Jimenez Solomon Jr., MD;  Location: [facility] ENDO;  Service: Endoscopy;  Laterality: N/A;       Social History     Tobacco Use    Smoking status: Never    Smokeless tobacco: Never   Substance Use Topics    Alcohol use: Yes     Alcohol/week: 16.0 standard drinks     Types: 8 Glasses of wine, 8 Shots of liquor per week    Drug use: No       Family History   Problem Relation Age of Onset    Cancer Mother      Heart disease Mother     Heart disease Father     Arthritis Father     Hypertension Father     Cancer Sister         breast cancer    Diabetes Sister     Asthma Brother     COPD Brother     Diabetes Brother     Stroke Brother     Cancer Sister         leukemia    Early death Sister          at 64 of leukemia    Colon cancer Neg Hx     Crohn's disease Neg Hx     Esophageal cancer Neg Hx     Ulcerative colitis Neg Hx     Stomach cancer Neg Hx        Review of patient's allergies indicates:   Allergen Reactions    Multaq [dronedarone]        Current Outpatient Medications on File Prior to Visit   Medication Sig Dispense Refill    albuterol (PROVENTIL/VENTOLIN HFA) 90 mcg/actuation inhaler INHALE TWO PUFFS INTO THE LUNGS EVERY 6 HOURS AS NEEDED FOR WHEEZING 54 g 3    amLODIPine (NORVASC) 5 MG tablet Take 1 tablet (5 mg total) by mouth once daily. 90 tablet 1    atorvastatin (LIPITOR) 40 MG tablet TAKE 1 TABLET(40 MG) BY MOUTH EVERY DAY 90 tablet 1    blood sugar diagnostic Strp To check BG once daily before a meal, to use with insurance preferred meter 100 strip 3    budesonide-formoterol 80-4.5 mcg (SYMBICORT) 80-4.5 mcg/actuation HFAA Controller; 2 puffs 2x a day; please provide generic. 10.2 g 2    clopidogreL (PLAVIX) 75 mg tablet Take 75 mg by mouth once daily.      ELIQUIS 5 mg Tab Take 1 tablet (5 mg total) by mouth 2 (two) times daily. 60 tablet 3    ezetimibe (ZETIA) 10 mg tablet Take 1/2 of 10 mg po daily. 30 tablet 2    lancets Misc To check BG once daily before a meal, to use with insurance preferred meter 100 each 3    levothyroxine (SYNTHROID) 88 MCG tablet TAKE 1 TABLET(88 MCG) BY MOUTH BEFORE BREAKFAST 90 tablet 3    losartan (COZAAR) 100 MG tablet Take 100 mg by mouth once daily.      metFORMIN (GLUCOPHAGE) 500 MG tablet TAKE 1 TABLET(500 MG) BY MOUTH TWICE DAILY 180 tablet 1    mupirocin (BACTROBAN) 2 % ointment Apply topically 3 (three) times daily. 22 g 0    nitroGLYCERIN (NITROSTAT) 0.4 MG SL  tablet DISSOLVE ONE T SUBLINGUALLY Q 5 MINUTES X 3 DOSES PRN FOR CHEST PAIN  0    sotaloL (BETAPACE) 80 MG tablet       SPIRIVA WITH HANDIHALER 18 mcg inhalation capsule as needed.       albuterol-ipratropium (DUO-NEB) 2.5 mg-0.5 mg/3 mL nebulizer solution Take 3 mLs by nebulization every 6 (six) hours as needed for Wheezing. Rescue 120 vial 3    budesonide (PULMICORT) 0.5 mg/2 mL nebulizer solution Take 2 mLs (0.5 mg total) by nebulization 2 (two) times daily. Controller 120 mL 3     No current facility-administered medications on file prior to visit.       Physical Exam  Vitals reviewed.   Constitutional:       Appearance: He is well-developed.   HENT:      Head: Normocephalic and atraumatic.   Neck:      Thyroid: No thyromegaly.   Cardiovascular:      Rate and Rhythm: Normal rate and regular rhythm.      Heart sounds: Normal heart sounds. No murmur heard.    No gallop.      Comments: MARCO ANTONIO 2-3/6 aortic; and 2-3/6 pulm; MARCO ANTONIO 3/6 LLSB, and 2/6 apex  Pulmonary:      Effort: Pulmonary effort is normal. No respiratory distress.      Breath sounds: Normal breath sounds. No wheezing or rales.   Abdominal:      General: Bowel sounds are normal. There is no distension.      Palpations: Abdomen is soft.      Tenderness: There is no abdominal tenderness. There is no guarding or rebound.   Musculoskeletal:         General: Normal range of motion.      Cervical back: Normal range of motion and neck supple.      Right lower leg: Edema present.      Left lower leg: Edema present.      Comments: Varicose veins w tr shraddha LE edema w no calf tenderness to palp.    Lymphadenopathy:      Cervical: No cervical adenopathy.   Skin:     Findings: No rash.   Neurological:      Mental Status: He is alert and oriented to person, place, and time.      Comments: Moves all 4 extremities fine.   Psychiatric:         Behavior: Behavior normal.         Thought Content: Thought content normal.       Assessment:       1. Essential hypertension    2.  ROBBY (obstructive sleep apnea)    3. Intolerance of continuous positive airway pressure (CPAP) ventilation    4. Mixed hyperlipidemia    5. Dyslipidemia    6. S/P coronary artery stent placement    7. MTHFR gene mutation    8. Stage 3a chronic kidney disease    9. Type 2 diabetes mellitus with other specified complication, without long-term current use of insulin    10. Hypothyroidism, unspecified type    11. Encounter for laboratory test    12. Anemia of chronic disease    13. Macrocytic anemia    14. Chronic allergic rhinitis    15. Mediastinal adenopathy    16. Recurrent pneumonia    17. Nonrheumatic aortic (valve) stenosis    18. Nonrheumatic aortic (valve) insufficiency          Plan:       Essential hypertension: Maintain < 2 Gm Na a day diet, and monitor BP at home; keep a log. Wait 5 minutes after being seated before running. Add cardura 1 mg an afternoon between 4-6 pm; if needed after 1 week can increase to 2 mg a day; goal < 130/80. Cont amlodipine and losartan.  Work on diet and exercise closer and add a DASH diet as well  -     doxazosin (CARDURA) 1 MG tablet; Start at 1 mg po every 4-6 pm; after 1 week if needed can increase to 2 mg every 4-6 pm. Generic please.  Dispense: 30 tablet; Refill: 2    ROBBY (obstructive sleep apnea): CPAP intolerant. Cont attempts at weight reduction    Intolerant of CPAP mask:  To continue attempts at weight reduction with regular exercise and adherence to his diet.    Mixed hyperlipidemia: Maintain low fat high fiber diet, exercise regularly. Weight reduction where indicated.  Cont atorvastatin and at 40 mg a day; zetia at 5 mg a day . LDL at 65.6,     Dyslipidemia; increase aerobic activity.     S/P coronary artery stent placement; CAD has been stable without any chest pain, shortness of breath, or palpitations. Keep follow up with cardiologist as directed.  Card is Dr RENITA Wrad.     MTHFR gene mutation:  To check homocystine levels with his labs for follow-up    Stage 3a  chronic kidney disease; increase fluids to 6-8 glasses a day. GFR slightly worse at 52,4 from 57.3.  -     vitamin renal formula, B-complex-vitamin c-folic acid, (NEPHROCAP) 1 mg Cap; Take 1 capsule by mouth once daily.  Dispense: 90 capsule; Refill: 3  -     Comprehensive Metabolic Panel; Future; Expected date: 11/18/2022    Type 2 diabetes mellitus with other specified complication, without long-term current use of insulin; Maintain a low carb diet; monitor CBG's at home; keep a log for review.  HgA1C at 5.6 w .   -     Hemoglobin A1C; Future; Expected date: 11/18/2022  -     Comprehensive Metabolic Panel; Future; Expected date: 11/18/2022    Hypothyroidism, unspecified type: TSH w free T4 at f/u. TSH 1.74 on 8/23 w free T4 at 0.99.    Encounter for laboratory test: labs reviewed w pt and ordered for f/u.     Anemia of chronic disease  -     vitamin renal formula, B-complex-vitamin c-folic acid, (NEPHROCAP) 1 mg Cap; Take 1 capsule by mouth once daily.  Dispense: 90 capsule; Refill: 3  -     Hemoglobin A1C; Future; Expected date: 11/18/2022  -     CBC Auto Differential; Future; Expected date: 11/18/2022  -     Vitamin B12; Future; Expected date: 11/18/2022  -     Folate; Future; Expected date: 11/18/2022    Macrocytic anemia  -     vitamin renal formula, B-complex-vitamin c-folic acid, (NEPHROCAP) 1 mg Cap; Take 1 capsule by mouth once daily.  Dispense: 90 capsule; Refill: 3  -     Hemoglobin A1C; Future; Expected date: 11/18/2022  -     CBC Auto Differential; Future; Expected date: 11/18/2022  -     Vitamin B12; Future; Expected date: 11/18/2022  -     Folate; Future; Expected date: 11/18/2022     Chronic allergic rhinitis with recurrent pneumonia:    Mediastinal adenopathy:  Recurrent pneumonia: Has been doing well; followed by Pulmonary service. Dr Mccurdy. Has been prescribed albuterol rescue inhaler  prior,if needed and Singulair.  Total IgG has been normal in the past as has IgG subclass 1,2, 3 and 4.   Please see Pulmonary service notes.    Nonrheumatic aortic valve stenosis and nonrheumatic aortic valve insufficiency:  No complaints of any chest pain or shortness of breath, palpitations or dizziness or lightheadedness.  Followed by his cardiologist Dr. BARTOLO Ward

## 2022-11-19 ENCOUNTER — TELEPHONE (OUTPATIENT)
Dept: FAMILY MEDICINE | Facility: CLINIC | Age: 77
End: 2022-11-19
Payer: MEDICARE

## 2022-11-19 NOTE — TELEPHONE ENCOUNTER
Please call patient and remind him to schedule follow-up appointment with Dr. Mccurdy if he has not done so yet, his pulmonologist to go over the CT of his chest and for follow-up reassessment if he has not done so since June of 2021.  I'M unable to find a follow-up note with Dr. Mccurdy with reviewing his chart.  Thank you

## 2022-11-26 RX ORDER — ALBUTEROL SULFATE 90 UG/1
AEROSOL, METERED RESPIRATORY (INHALATION)
Qty: 54 G | Refills: 3 | Status: SHIPPED | OUTPATIENT
Start: 2022-11-26 | End: 2023-09-24 | Stop reason: SDUPTHER

## 2022-11-26 NOTE — TELEPHONE ENCOUNTER
No new care gaps identified.  [facility] Embedded Care Gaps. Reference number: [id number]. 11/26/2022   3:28:34 AM CST

## 2022-11-26 NOTE — TELEPHONE ENCOUNTER
Refill Decision Note   Jadiel Rossi  is requesting a refill authorization.  Brief Assessment and Rationale for Refill:  Approve     Medication Therapy Plan:       Medication Reconciliation Completed: No   Comments:     No Care Gaps recommended.     Note composed:8:52 AM 11/26/2022

## 2023-01-18 ENCOUNTER — HOSPITAL ENCOUNTER (OUTPATIENT)
Dept: RADIOLOGY | Facility: HOSPITAL | Age: 78
Discharge: HOME | End: 2023-01-18
Attending: INTERNAL MEDICINE
Payer: MEDICARE

## 2023-01-18 DIAGNOSIS — R06.02 SOB (SHORTNESS OF BREATH): ICD-10-CM

## 2023-01-18 PROCEDURE — 71046 X-RAY EXAM CHEST 2 VIEWS: CPT | Mod: 26,HCNC,, | Performed by: RADIOLOGY

## 2023-01-18 PROCEDURE — 71046 X-RAY EXAM CHEST 2 VIEWS: CPT | Mod: TC,HCNC,PN

## 2023-01-18 PROCEDURE — 71046 XR CHEST PA AND LATERAL: ICD-10-PCS | Mod: 26,HCNC,, | Performed by: RADIOLOGY

## 2023-01-19 ENCOUNTER — LAB VISIT (OUTPATIENT)
Dept: LAB | Facility: HOSPITAL | Age: 78
End: 2023-01-19
Attending: INTERNAL MEDICINE
Payer: MEDICARE

## 2023-01-19 DIAGNOSIS — R73.9 BLOOD GLUCOSE ELEVATED: ICD-10-CM

## 2023-01-19 DIAGNOSIS — I51.9 MYXEDEMA HEART DISEASE: ICD-10-CM

## 2023-01-19 DIAGNOSIS — I35.1 AORTIC INCOMPETENCE: Primary | ICD-10-CM

## 2023-01-19 DIAGNOSIS — E03.9 MYXEDEMA HEART DISEASE: ICD-10-CM

## 2023-01-19 DIAGNOSIS — I25.10 CORONARY ATHEROSCLEROSIS OF NATIVE CORONARY ARTERY: ICD-10-CM

## 2023-01-19 DIAGNOSIS — E78.5 HYPERLIPEMIA: ICD-10-CM

## 2023-01-19 LAB
BASOPHILS # BLD AUTO: 0.07 K/UL (ref 0–0.2)
BASOPHILS NFR BLD: 1 % (ref 0–1.9)
DIFFERENTIAL METHOD: ABNORMAL
EOSINOPHIL # BLD AUTO: 0.6 K/UL (ref 0–0.5)
EOSINOPHIL NFR BLD: 8.6 % (ref 0–8)
ERYTHROCYTE [DISTWIDTH] IN BLOOD BY AUTOMATED COUNT: 12.9 % (ref 11.5–14.5)
HCT VFR BLD AUTO: 30.7 % (ref 40–54)
HGB BLD-MCNC: 9.8 G/DL (ref 14–18)
IMM GRANULOCYTES # BLD AUTO: 0.01 K/UL (ref 0–0.04)
IMM GRANULOCYTES NFR BLD AUTO: 0.1 % (ref 0–0.5)
LYMPHOCYTES # BLD AUTO: 1.5 K/UL (ref 1–4.8)
LYMPHOCYTES NFR BLD: 22.9 % (ref 18–48)
MCH RBC QN AUTO: 31 PG (ref 27–31)
MCHC RBC AUTO-ENTMCNC: 31.9 G/DL (ref 32–36)
MCV RBC AUTO: 97 FL (ref 82–98)
MONOCYTES # BLD AUTO: 0.8 K/UL (ref 0.3–1)
MONOCYTES NFR BLD: 11.2 % (ref 4–15)
NEUTROPHILS # BLD AUTO: 3.8 K/UL (ref 1.8–7.7)
NEUTROPHILS NFR BLD: 56.2 % (ref 38–73)
NRBC BLD-RTO: 0 /100 WBC
PLATELET # BLD AUTO: 205 K/UL (ref 150–450)
PMV BLD AUTO: 10.6 FL (ref 9.2–12.9)
RBC # BLD AUTO: 3.16 M/UL (ref 4.6–6.2)
WBC # BLD AUTO: 6.72 K/UL (ref 3.9–12.7)

## 2023-01-19 PROCEDURE — 82550 ASSAY OF CK (CPK): CPT | Mod: HCNC | Performed by: INTERNAL MEDICINE

## 2023-01-19 PROCEDURE — 84439 ASSAY OF FREE THYROXINE: CPT | Mod: HCNC | Performed by: INTERNAL MEDICINE

## 2023-01-19 PROCEDURE — 85025 COMPLETE CBC W/AUTO DIFF WBC: CPT | Mod: HCNC | Performed by: INTERNAL MEDICINE

## 2023-01-19 PROCEDURE — 80061 LIPID PANEL: CPT | Mod: HCNC | Performed by: INTERNAL MEDICINE

## 2023-01-19 PROCEDURE — 83036 HEMOGLOBIN GLYCOSYLATED A1C: CPT | Mod: HCNC | Performed by: INTERNAL MEDICINE

## 2023-01-19 PROCEDURE — 80053 COMPREHEN METABOLIC PANEL: CPT | Mod: HCNC | Performed by: INTERNAL MEDICINE

## 2023-01-19 PROCEDURE — 84480 ASSAY TRIIODOTHYRONINE (T3): CPT | Mod: HCNC | Performed by: INTERNAL MEDICINE

## 2023-01-19 PROCEDURE — 84443 ASSAY THYROID STIM HORMONE: CPT | Mod: HCNC | Performed by: INTERNAL MEDICINE

## 2023-01-19 PROCEDURE — 36415 COLL VENOUS BLD VENIPUNCTURE: CPT | Mod: HCNC,PN | Performed by: INTERNAL MEDICINE

## 2023-01-20 LAB
ALBUMIN SERPL BCP-MCNC: 3.8 G/DL (ref 3.5–5.2)
ALP SERPL-CCNC: 50 U/L (ref 55–135)
ALT SERPL W/O P-5'-P-CCNC: 24 U/L (ref 10–44)
ANION GAP SERPL CALC-SCNC: 10 MMOL/L (ref 8–16)
AST SERPL-CCNC: 28 U/L (ref 10–40)
BILIRUB SERPL-MCNC: 0.6 MG/DL (ref 0.1–1)
BUN SERPL-MCNC: 25 MG/DL (ref 8–23)
CALCIUM SERPL-MCNC: 10.3 MG/DL (ref 8.7–10.5)
CHLORIDE SERPL-SCNC: 101 MMOL/L (ref 95–110)
CHOLEST SERPL-MCNC: 123 MG/DL (ref 120–199)
CHOLEST/HDLC SERPL: 3.2 {RATIO} (ref 2–5)
CK SERPL-CCNC: 125 U/L (ref 20–200)
CO2 SERPL-SCNC: 28 MMOL/L (ref 23–29)
CREAT SERPL-MCNC: 1.5 MG/DL (ref 0.5–1.4)
EST. GFR  (NO RACE VARIABLE): 48 ML/MIN/1.73 M^2
ESTIMATED AVG GLUCOSE: 120 MG/DL (ref 68–131)
GLUCOSE SERPL-MCNC: 92 MG/DL (ref 70–110)
HBA1C MFR BLD: 5.8 % (ref 4–5.6)
HDLC SERPL-MCNC: 38 MG/DL (ref 40–75)
HDLC SERPL: 30.9 % (ref 20–50)
LDLC SERPL CALC-MCNC: 60.4 MG/DL (ref 63–159)
NONHDLC SERPL-MCNC: 85 MG/DL
POTASSIUM SERPL-SCNC: 5.2 MMOL/L (ref 3.5–5.1)
PROT SERPL-MCNC: 7 G/DL (ref 6–8.4)
SODIUM SERPL-SCNC: 139 MMOL/L (ref 136–145)
T3 SERPL-MCNC: 70 NG/DL (ref 60–180)
T4 FREE SERPL-MCNC: 1.08 NG/DL (ref 0.71–1.51)
TRIGL SERPL-MCNC: 123 MG/DL (ref 30–150)
TSH SERPL DL<=0.005 MIU/L-ACNC: 1.85 UIU/ML (ref 0.4–4)

## 2023-01-25 ENCOUNTER — PATIENT MESSAGE (OUTPATIENT)
Dept: FAMILY MEDICINE | Facility: CLINIC | Age: 78
End: 2023-01-25

## 2023-01-25 DIAGNOSIS — D50.9 IRON DEFICIENCY ANEMIA, UNSPECIFIED IRON DEFICIENCY ANEMIA TYPE: ICD-10-CM

## 2023-01-25 DIAGNOSIS — E11.69 TYPE 2 DIABETES MELLITUS WITH OTHER SPECIFIED COMPLICATION, WITHOUT LONG-TERM CURRENT USE OF INSULIN: ICD-10-CM

## 2023-01-25 DIAGNOSIS — E03.9 HYPOTHYROIDISM, UNSPECIFIED TYPE: ICD-10-CM

## 2023-01-25 DIAGNOSIS — Z15.89 MTHFR GENE MUTATION: ICD-10-CM

## 2023-01-25 DIAGNOSIS — I10 ESSENTIAL HYPERTENSION: Primary | ICD-10-CM

## 2023-01-25 DIAGNOSIS — E78.2 MIXED HYPERLIPIDEMIA: ICD-10-CM

## 2023-02-07 DIAGNOSIS — Z00.00 ENCOUNTER FOR MEDICARE ANNUAL WELLNESS EXAM: ICD-10-CM

## 2023-02-09 DIAGNOSIS — Z00.00 ENCOUNTER FOR MEDICARE ANNUAL WELLNESS EXAM: ICD-10-CM

## 2023-02-14 ENCOUNTER — PATIENT MESSAGE (OUTPATIENT)
Dept: FAMILY MEDICINE | Facility: CLINIC | Age: 78
End: 2023-02-14
Payer: MEDICARE

## 2023-02-14 DIAGNOSIS — Z95.5 S/P CORONARY ARTERY STENT PLACEMENT: ICD-10-CM

## 2023-02-14 DIAGNOSIS — E78.2 MIXED HYPERLIPIDEMIA: ICD-10-CM

## 2023-02-14 RX ORDER — ATORVASTATIN CALCIUM 40 MG/1
40 TABLET, FILM COATED ORAL DAILY
Qty: 90 TABLET | Refills: 1 | Status: SHIPPED | OUTPATIENT
Start: 2023-02-14 | End: 2023-05-25

## 2023-02-14 NOTE — TELEPHONE ENCOUNTER
No new care gaps identified.  [facility] Embedded Care Gaps. Reference number: [id number]. 2/14/2023   3:19:49 PM CST

## 2023-04-04 NOTE — TELEPHONE ENCOUNTER
No new care gaps identified.  [facility] Embedded Care Gaps. Reference number: [id number]. 4/04/2023   5:02:23 AM GABINOT

## 2023-04-05 RX ORDER — METFORMIN HYDROCHLORIDE 500 MG/1
TABLET ORAL
Qty: 180 TABLET | Refills: 1 | Status: SHIPPED | OUTPATIENT
Start: 2023-04-05 | End: 2023-07-10

## 2023-05-24 DIAGNOSIS — Z95.5 S/P CORONARY ARTERY STENT PLACEMENT: ICD-10-CM

## 2023-05-24 DIAGNOSIS — E78.2 MIXED HYPERLIPIDEMIA: ICD-10-CM

## 2023-05-24 NOTE — TELEPHONE ENCOUNTER
Care Due:                  Date            Visit Type   Department     Provider  --------------------------------------------------------------------------------                                EP -                              [facility]  Last Visit: 11-      CARE ([facility])   MEDICINE       Eliud Chandler                               -                              [facility]  Next Visit: 06-      CARE ([facility])   MEDICINE       Eliud Chandler                                                            Last  Test          Frequency    Reason                     Performed    Due Date  --------------------------------------------------------------------------------    HBA1C.......  6 months...  metFORMIN................  01- 07-    [facility] Embedded Care Due Messages. Reference number: [id number].   5/24/2023 6:04:32 PM CDT

## 2023-05-25 RX ORDER — ATORVASTATIN CALCIUM 40 MG/1
40 TABLET, FILM COATED ORAL DAILY
Qty: 90 TABLET | Refills: 1 | Status: SHIPPED | OUTPATIENT
Start: 2023-05-25 | End: 2024-02-26 | Stop reason: SDUPTHER

## 2023-05-25 NOTE — TELEPHONE ENCOUNTER
Please ask patient to schedule a follow-up appointment with me sometime in the next few weeks as he is past due for follow-up.  Last office visit with me was 11/18/2022.  With his having hypertension and diabetes he needs to be seen by me if both are well-controlled at least every 6 months.  Orders have been placed for his labs to be obtained after an overnight fast 1 week before his visit.  Please also ask him to pass by the office to  3 stools for occult blood to be performed.

## 2023-06-26 ENCOUNTER — LAB VISIT (OUTPATIENT)
Dept: LAB | Facility: HOSPITAL | Age: 78
End: 2023-06-26
Attending: INTERNAL MEDICINE
Payer: MEDICARE

## 2023-06-26 DIAGNOSIS — I10 ESSENTIAL HYPERTENSION: ICD-10-CM

## 2023-06-26 DIAGNOSIS — E78.2 MIXED HYPERLIPIDEMIA: ICD-10-CM

## 2023-06-26 DIAGNOSIS — Z15.89 MTHFR GENE MUTATION: ICD-10-CM

## 2023-06-26 DIAGNOSIS — E11.69 TYPE 2 DIABETES MELLITUS WITH OTHER SPECIFIED COMPLICATION, WITHOUT LONG-TERM CURRENT USE OF INSULIN: ICD-10-CM

## 2023-06-26 DIAGNOSIS — D50.9 IRON DEFICIENCY ANEMIA, UNSPECIFIED IRON DEFICIENCY ANEMIA TYPE: ICD-10-CM

## 2023-06-26 DIAGNOSIS — E03.9 HYPOTHYROIDISM, UNSPECIFIED TYPE: ICD-10-CM

## 2023-06-26 LAB
ALBUMIN SERPL BCP-MCNC: 4 G/DL (ref 3.5–5.2)
ALP SERPL-CCNC: 49 U/L (ref 55–135)
ALT SERPL W/O P-5'-P-CCNC: 20 U/L (ref 10–44)
ANION GAP SERPL CALC-SCNC: 15 MMOL/L (ref 8–16)
AST SERPL-CCNC: 27 U/L (ref 10–40)
BASOPHILS # BLD AUTO: 0.08 K/UL (ref 0–0.2)
BASOPHILS NFR BLD: 0.9 % (ref 0–1.9)
BILIRUB SERPL-MCNC: 0.6 MG/DL (ref 0.1–1)
BUN SERPL-MCNC: 34 MG/DL (ref 8–23)
CALCIUM SERPL-MCNC: 10.3 MG/DL (ref 8.7–10.5)
CHLORIDE SERPL-SCNC: 104 MMOL/L (ref 95–110)
CO2 SERPL-SCNC: 22 MMOL/L (ref 23–29)
CREAT SERPL-MCNC: 1.6 MG/DL (ref 0.5–1.4)
DIFFERENTIAL METHOD: ABNORMAL
EOSINOPHIL # BLD AUTO: 0.5 K/UL (ref 0–0.5)
EOSINOPHIL NFR BLD: 5.4 % (ref 0–8)
ERYTHROCYTE [DISTWIDTH] IN BLOOD BY AUTOMATED COUNT: 15.3 % (ref 11.5–14.5)
EST. GFR  (NO RACE VARIABLE): 44.4 ML/MIN/1.73 M^2
ESTIMATED AVG GLUCOSE: 117 MG/DL (ref 68–131)
FERRITIN SERPL-MCNC: 190 NG/ML (ref 20–300)
GLUCOSE SERPL-MCNC: 96 MG/DL (ref 70–110)
HBA1C MFR BLD: 5.7 % (ref 4–5.6)
HCT VFR BLD AUTO: 33 % (ref 40–54)
HCYS SERPL-SCNC: 11 UMOL/L (ref 4–16.5)
HGB BLD-MCNC: 10.4 G/DL (ref 14–18)
IMM GRANULOCYTES # BLD AUTO: 0.02 K/UL (ref 0–0.04)
IMM GRANULOCYTES NFR BLD AUTO: 0.2 % (ref 0–0.5)
IRON SERPL-MCNC: 60 UG/DL (ref 45–160)
LYMPHOCYTES # BLD AUTO: 2 K/UL (ref 1–4.8)
LYMPHOCYTES NFR BLD: 23.7 % (ref 18–48)
MAGNESIUM SERPL-MCNC: 1.5 MG/DL (ref 1.6–2.6)
MCH RBC QN AUTO: 31 PG (ref 27–31)
MCHC RBC AUTO-ENTMCNC: 31.5 G/DL (ref 32–36)
MCV RBC AUTO: 99 FL (ref 82–98)
MONOCYTES # BLD AUTO: 1 K/UL (ref 0.3–1)
MONOCYTES NFR BLD: 11.7 % (ref 4–15)
NEUTROPHILS # BLD AUTO: 5 K/UL (ref 1.8–7.7)
NEUTROPHILS NFR BLD: 58.1 % (ref 38–73)
NRBC BLD-RTO: 0 /100 WBC
PLATELET # BLD AUTO: 244 K/UL (ref 150–450)
PMV BLD AUTO: 10.3 FL (ref 9.2–12.9)
POTASSIUM SERPL-SCNC: 4.5 MMOL/L (ref 3.5–5.1)
PROT SERPL-MCNC: 7.3 G/DL (ref 6–8.4)
RBC # BLD AUTO: 3.35 M/UL (ref 4.6–6.2)
RETICS/RBC NFR AUTO: 2.4 % (ref 0.4–2)
SATURATED IRON: 18 % (ref 20–50)
SODIUM SERPL-SCNC: 141 MMOL/L (ref 136–145)
T3FREE SERPL-MCNC: 2.3 PG/ML (ref 2.3–4.2)
T4 FREE SERPL-MCNC: 1.09 NG/DL (ref 0.71–1.51)
TOTAL IRON BINDING CAPACITY: 342 UG/DL (ref 250–450)
TRANSFERRIN SERPL-MCNC: 231 MG/DL (ref 200–375)
TSH SERPL DL<=0.005 MIU/L-ACNC: 4.25 UIU/ML (ref 0.4–4)
WBC # BLD AUTO: 8.55 K/UL (ref 3.9–12.7)

## 2023-06-26 PROCEDURE — 85045 AUTOMATED RETICULOCYTE COUNT: CPT | Performed by: INTERNAL MEDICINE

## 2023-06-26 PROCEDURE — 84443 ASSAY THYROID STIM HORMONE: CPT | Performed by: INTERNAL MEDICINE

## 2023-06-26 PROCEDURE — 36415 COLL VENOUS BLD VENIPUNCTURE: CPT | Mod: PN | Performed by: INTERNAL MEDICINE

## 2023-06-26 PROCEDURE — 83036 HEMOGLOBIN GLYCOSYLATED A1C: CPT | Performed by: INTERNAL MEDICINE

## 2023-06-26 PROCEDURE — 83540 ASSAY OF IRON: CPT | Performed by: INTERNAL MEDICINE

## 2023-06-26 PROCEDURE — 82728 ASSAY OF FERRITIN: CPT | Performed by: INTERNAL MEDICINE

## 2023-06-26 PROCEDURE — 84439 ASSAY OF FREE THYROXINE: CPT | Performed by: INTERNAL MEDICINE

## 2023-06-26 PROCEDURE — 85025 COMPLETE CBC W/AUTO DIFF WBC: CPT | Performed by: INTERNAL MEDICINE

## 2023-06-26 PROCEDURE — 80053 COMPREHEN METABOLIC PANEL: CPT | Performed by: INTERNAL MEDICINE

## 2023-06-26 PROCEDURE — 84466 ASSAY OF TRANSFERRIN: CPT | Performed by: INTERNAL MEDICINE

## 2023-06-26 PROCEDURE — 84481 FREE ASSAY (FT-3): CPT | Performed by: INTERNAL MEDICINE

## 2023-06-26 PROCEDURE — 83090 ASSAY OF HOMOCYSTEINE: CPT | Performed by: INTERNAL MEDICINE

## 2023-06-26 PROCEDURE — 83735 ASSAY OF MAGNESIUM: CPT | Performed by: INTERNAL MEDICINE

## 2023-06-27 ENCOUNTER — OFFICE VISIT (OUTPATIENT)
Dept: FAMILY MEDICINE | Facility: CLINIC | Age: 78
End: 2023-06-27
Payer: MEDICARE

## 2023-06-27 VITALS
HEIGHT: 70 IN | DIASTOLIC BLOOD PRESSURE: 74 MMHG | BODY MASS INDEX: 28.99 KG/M2 | HEART RATE: 78 BPM | WEIGHT: 202.5 LBS | OXYGEN SATURATION: 99 % | SYSTOLIC BLOOD PRESSURE: 126 MMHG

## 2023-06-27 DIAGNOSIS — Z23 IMMUNIZATION DUE: ICD-10-CM

## 2023-06-27 DIAGNOSIS — I70.0 AORTIC ATHEROSCLEROSIS: Primary | ICD-10-CM

## 2023-06-27 DIAGNOSIS — E11.69 TYPE 2 DIABETES MELLITUS WITH OTHER SPECIFIED COMPLICATION, WITHOUT LONG-TERM CURRENT USE OF INSULIN: ICD-10-CM

## 2023-06-27 DIAGNOSIS — N18.31 STAGE 3A CHRONIC KIDNEY DISEASE: ICD-10-CM

## 2023-06-27 DIAGNOSIS — E03.9 HYPOTHYROIDISM, UNSPECIFIED TYPE: ICD-10-CM

## 2023-06-27 PROCEDURE — 90732 PNEUMOCOCCAL POLYSACCHARIDE VACCINE 23-VALENT =>2YO SQ IM: ICD-10-PCS | Mod: S$GLB,,, | Performed by: FAMILY MEDICINE

## 2023-06-27 PROCEDURE — G0009 PNEUMOCOCCAL POLYSACCHARIDE VACCINE 23-VALENT =>2YO SQ IM: ICD-10-PCS | Mod: S$GLB,,, | Performed by: FAMILY MEDICINE

## 2023-06-27 PROCEDURE — 1126F PR PAIN SEVERITY QUANTIFIED, NO PAIN PRESENT: ICD-10-PCS | Mod: CPTII,S$GLB,, | Performed by: FAMILY MEDICINE

## 2023-06-27 PROCEDURE — 3288F FALL RISK ASSESSMENT DOCD: CPT | Mod: CPTII,S$GLB,, | Performed by: FAMILY MEDICINE

## 2023-06-27 PROCEDURE — 99999 PR PBB SHADOW E&M-EST. PATIENT-LVL IV: CPT | Mod: PBBFAC,,, | Performed by: FAMILY MEDICINE

## 2023-06-27 PROCEDURE — 3074F SYST BP LT 130 MM HG: CPT | Mod: CPTII,S$GLB,, | Performed by: FAMILY MEDICINE

## 2023-06-27 PROCEDURE — 3078F DIAST BP <80 MM HG: CPT | Mod: CPTII,S$GLB,, | Performed by: FAMILY MEDICINE

## 2023-06-27 PROCEDURE — 3074F PR MOST RECENT SYSTOLIC BLOOD PRESSURE < 130 MM HG: ICD-10-PCS | Mod: CPTII,S$GLB,, | Performed by: FAMILY MEDICINE

## 2023-06-27 PROCEDURE — 90732 PPSV23 VACC 2 YRS+ SUBQ/IM: CPT | Mod: S$GLB,,, | Performed by: FAMILY MEDICINE

## 2023-06-27 PROCEDURE — G0009 ADMIN PNEUMOCOCCAL VACCINE: HCPCS | Mod: S$GLB,,, | Performed by: FAMILY MEDICINE

## 2023-06-27 PROCEDURE — 99214 PR OFFICE/OUTPT VISIT, EST, LEVL IV, 30-39 MIN: ICD-10-PCS | Mod: S$GLB,,, | Performed by: FAMILY MEDICINE

## 2023-06-27 PROCEDURE — 3078F PR MOST RECENT DIASTOLIC BLOOD PRESSURE < 80 MM HG: ICD-10-PCS | Mod: CPTII,S$GLB,, | Performed by: FAMILY MEDICINE

## 2023-06-27 PROCEDURE — 99999 PR PBB SHADOW E&M-EST. PATIENT-LVL IV: ICD-10-PCS | Mod: PBBFAC,,, | Performed by: FAMILY MEDICINE

## 2023-06-27 PROCEDURE — 1101F PR PT FALLS ASSESS DOC 0-1 FALLS W/OUT INJ PAST YR: ICD-10-PCS | Mod: CPTII,S$GLB,, | Performed by: FAMILY MEDICINE

## 2023-06-27 PROCEDURE — 1159F PR MEDICATION LIST DOCUMENTED IN MEDICAL RECORD: ICD-10-PCS | Mod: CPTII,S$GLB,, | Performed by: FAMILY MEDICINE

## 2023-06-27 PROCEDURE — 1126F AMNT PAIN NOTED NONE PRSNT: CPT | Mod: CPTII,S$GLB,, | Performed by: FAMILY MEDICINE

## 2023-06-27 PROCEDURE — 99214 OFFICE O/P EST MOD 30 MIN: CPT | Mod: S$GLB,,, | Performed by: FAMILY MEDICINE

## 2023-06-27 PROCEDURE — 1101F PT FALLS ASSESS-DOCD LE1/YR: CPT | Mod: CPTII,S$GLB,, | Performed by: FAMILY MEDICINE

## 2023-06-27 PROCEDURE — 3288F PR FALLS RISK ASSESSMENT DOCUMENTED: ICD-10-PCS | Mod: CPTII,S$GLB,, | Performed by: FAMILY MEDICINE

## 2023-06-27 PROCEDURE — 1159F MED LIST DOCD IN RCRD: CPT | Mod: CPTII,S$GLB,, | Performed by: FAMILY MEDICINE

## 2023-06-27 NOTE — PROGRESS NOTES
THIS DOCUMENT WAS MADE IN PART WITH VOICE RECOGNITION SOFTWARE.  OCCASIONALLY THIS SOFTWARE WILL MISINTERPRET WORDS OR PHRASES.    Assessment and Plan:    1. Aortic atherosclerosis        2. Stage 3a chronic kidney disease  Comprehensive Metabolic Panel      3. Type 2 diabetes mellitus with other specified complication, without long-term current use of insulin  Hemoglobin A1C      4. Immunization due  (In Office Administered) Pneumococcal Polysaccharide Vaccine (23 Valent) (SQ/IM)      5. Hypothyroidism, unspecified type  TSH          PLAN    Recheck A1c, kidney function prior to next visit as well as TSH   Counseled on proper medication use   Patient will consider referral to specialist for evaluation for inspire device  Prevnar 23 vaccine today   Other chronic conditions stable      ______________________________________________________________________  Subjective:    Chief Complaint:  Chief Complaint   Patient presents with    Follow-up     Review labs had done yesterday         HPI:  Jadiel is a 77 y.o. year old       Patient here for lab review, health maintenance  See chronic conditions below   Notable lab findings include slightly worsening kidney function, stable anemia which is chronic      History atrial fibrillation   Rx-Eliquis, sotalol   Cardiology- MD Ed     Type 2 diabetes mellitus   Rx-metformin  Prev a1c : 5.7    Obstructive sleep apnea   CPAP intolerant     Dyslipidemia , bilateral carotid artery disease, CAD s/p PCI x2-3  Rx-atorvastatin 40 mg , Plavix, Zetia    Hypothyroidism   Rx-Synthroid 88 mcg     Essential hypertension   Rx-doxazosin, amlodipine 5 mg, losartan 100 mg     Anxiety   No current medication    Chronic kidney disease, proteinuria  Baseline creatinine-    History iron deficiency anemia    Persistent asthma   Rx-rescue inhaler        Past Medical History:  Past Medical History:   Diagnosis Date    Anemia     Anxiety     Atrial fibrillation     admitted to [facility] 7/1/18 for AF w  RVR; no cardioversion needed; Dr Mendes EPS. Slowed down on its own and back to NSR; placed on eliquis.     Carotid artery plaque     bilateral    Diabetes mellitus, type 2     Essential hypertension     Hypertension     Hypothyroidism     Liver dysfunction     Methylenetetrahydrofolate reductase (MTHFR) gene mutation     Mixed hyperlipidemia     Obesity     ROBBY (obstructive sleep apnea)     Proteinuria     Valvular heart disease        Past Surgical History:  Past Surgical History:   Procedure Laterality Date    APPENDECTOMY  [id number]    COLONOSCOPY N/A 2019    Procedure: COLONOSCOPY;  Surgeon: Jimenez Solomon Jr., MD;  Location: [facility] ENDO;  Service: Endoscopy;  Laterality: N/A;    CORONARY STENT PLACEMENT  2017    LAD by Dr Ward    ESOPHAGOGASTRODUODENOSCOPY N/A 2019    Procedure: EGD (ESOPHAGOGASTRODUODENOSCOPY);  Surgeon: Jimenez Solomon Jr., MD;  Location: [facility] ENDO;  Service: Endoscopy;  Laterality: N/A;       Family History:  Family History   Problem Relation Age of Onset    Cancer Mother     Heart disease Mother     Heart disease Father     Arthritis Father     Hypertension Father     Cancer Sister         breast cancer    Diabetes Sister     Asthma Brother     COPD Brother     Diabetes Brother     Stroke Brother     Cancer Sister         leukemia    Early death Sister          at 64 of leukemia    Colon cancer Neg Hx     Crohn's disease Neg Hx     Esophageal cancer Neg Hx     Ulcerative colitis Neg Hx     Stomach cancer Neg Hx        Social History:  Social History     Socioeconomic History    Marital status:      Spouse name: Gwen   Occupational History    Occupation:  HS.   Tobacco Use    Smoking status: Never    Smokeless tobacco: Never   Substance and Sexual Activity    Alcohol use: Yes     Alcohol/week: 16.0 standard drinks     Types: 8 Glasses of wine, 8 Shots of liquor per week    Drug use: No       Medications:  Current Outpatient Medications on File  Prior to Visit   Medication Sig Dispense Refill    albuterol (PROVENTIL/VENTOLIN HFA) 90 mcg/actuation inhaler INHALE 2 PUFFS INTO THE LUNGS EVERY 6 HOURS AS NEEDED FOR WHEEZING 54 g 3    albuterol-ipratropium (DUO-NEB) 2.5 mg-0.5 mg/3 mL nebulizer solution Take 3 mLs by nebulization every 6 (six) hours as needed for Wheezing. Rescue 120 each 3    amLODIPine (NORVASC) 5 MG tablet TAKE 1 TABLET(5 MG) BY MOUTH EVERY DAY 90 tablet 2    atorvastatin (LIPITOR) 40 MG tablet Take 1 tablet (40 mg total) by mouth once daily. 90 tablet 1    blood sugar diagnostic Strp To check BG once daily before a meal, to use with insurance preferred meter 100 strip 3    clopidogreL (PLAVIX) 75 mg tablet Take 75 mg by mouth once daily.      doxazosin (CARDURA) 1 MG tablet TAKE ONE TABLET BY MOUTH EVERY DAY, BETWEEN 4 AND 6PM. AFTER 1 WEEK IF NEEDED CAN INCREASE TO 2MG DAILY BETWEEN 4-6PM 30 tablet 2    ELIQUIS 5 mg Tab Take 1 tablet (5 mg total) by mouth 2 (two) times daily. 60 tablet 3    ezetimibe (ZETIA) 10 mg tablet TAKE ONE-HALF TABLET BY MOUTH DAILY 45 tablet 3    lancets Misc To check BG once daily before a meal, to use with insurance preferred meter 100 each 3    levothyroxine (SYNTHROID) 88 MCG tablet TAKE 1 TABLET(88 MCG) BY MOUTH BEFORE BREAKFAST 90 tablet 3    losartan (COZAAR) 100 MG tablet Take 100 mg by mouth once daily.      metFORMIN (GLUCOPHAGE) 500 MG tablet TAKE 1 TABLET(500 MG) BY MOUTH TWICE DAILY 180 tablet 1    nitroGLYCERIN (NITROSTAT) 0.4 MG SL tablet DISSOLVE ONE T SUBLINGUALLY Q 5 MINUTES X 3 DOSES PRN FOR CHEST PAIN  0    vitamin renal formula, B-complex-vitamin c-folic acid, (NEPHROCAP) 1 mg Cap Take 1 capsule by mouth once daily. 90 capsule 3    budesonide (PULMICORT) 0.5 mg/2 mL nebulizer solution Take 2 mLs (0.5 mg total) by nebulization 2 (two) times daily. Controller (Patient not taking: Reported on 6/27/2023) 120 mL 3    budesonide-formoterol 80-4.5 mcg (SYMBICORT) 80-4.5 mcg/actuation HFAA Controller; 2  "puffs 2x a day; please provide generic. (Patient not taking: Reported on 6/27/2023) 10.2 g 2    mupirocin (BACTROBAN) 2 % ointment Apply topically 3 (three) times daily. (Patient not taking: Reported on 6/27/2023) 22 g 0    sotaloL (BETAPACE) 80 MG tablet       SPIRIVA WITH HANDIHALER 18 mcg inhalation capsule as needed.        No current facility-administered medications on file prior to visit.       Allergies:  Multaq [dronedarone]    Immunizations:  Immunization History   Administered Date(s) Administered    COVID-19, MRNA, LN-S, PF (Pfizer) (Purple Cap) 01/10/2021, 01/31/2021, 08/19/2021    Influenza (FLUAD) - Quadrivalent - Adjuvanted - PF *Preferred* (65+) 09/24/2020    Influenza - High Dose - PF (65 years and older) 09/10/2018, 03/05/2020    Pneumococcal Conjugate - 13 Valent 09/10/2018    Tdap 08/24/2022       Review of Systems:  Review of Systems   All other systems reviewed and are negative.    Objective:    Vitals:  Vitals:    06/27/23 1410   BP: 126/74   Pulse: 78   SpO2: 99%   Weight: 91.8 kg (202 lb 7.9 oz)   Height: 5' 10" (1.778 m)   PainSc: 0-No pain       Physical Exam  Vitals reviewed.   Constitutional:       General: He is not in acute distress.  HENT:      Head: Normocephalic and atraumatic.   Eyes:      Pupils: Pupils are equal, round, and reactive to light.   Cardiovascular:      Rate and Rhythm: Normal rate and regular rhythm.      Heart sounds: No murmur heard.    No friction rub.   Pulmonary:      Effort: Pulmonary effort is normal.      Breath sounds: Normal breath sounds.   Abdominal:      General: Bowel sounds are normal. There is no distension.      Palpations: Abdomen is soft.      Tenderness: There is no abdominal tenderness.   Musculoskeletal:      Cervical back: Neck supple.   Skin:     General: Skin is warm and dry.      Findings: No rash.   Psychiatric:         Behavior: Behavior normal.           Reji Lemos MD  Family Medicine      "

## 2023-07-09 NOTE — TELEPHONE ENCOUNTER
No care due was identified.  [facility] Embedded Care Due Messages. Reference number: [id number].   7/09/2023 8:04:29 AM CDT

## 2023-07-10 RX ORDER — METFORMIN HYDROCHLORIDE 500 MG/1
500 TABLET ORAL 2 TIMES DAILY
Qty: 180 TABLET | Refills: 1 | Status: SHIPPED | OUTPATIENT
Start: 2023-07-10 | End: 2023-10-27

## 2023-07-10 NOTE — TELEPHONE ENCOUNTER
Refill Routing Note   Medication(s) are not appropriate for processing by [facility] for the following reason(s):      No active prescription written by PCP:      [facility] action(s):  Defer Care Due:  None identified          Appointments  past 12m or future 3m with PCP    Date Provider   Last Visit   6/27/2023 Reji Lemos MD   Next Visit   10/27/2023 Reji Lemos MD   ED visits in past 90 days: 0        Note composed:8:01 AM 07/10/2023

## 2023-09-18 DIAGNOSIS — I10 ESSENTIAL HYPERTENSION: ICD-10-CM

## 2023-09-18 RX ORDER — AMLODIPINE BESYLATE 5 MG/1
TABLET ORAL
Qty: 90 TABLET | Refills: 2 | Status: SHIPPED | OUTPATIENT
Start: 2023-09-18

## 2023-09-24 RX ORDER — ALBUTEROL SULFATE 90 UG/1
AEROSOL, METERED RESPIRATORY (INHALATION)
Qty: 54 G | Refills: 3 | Status: SHIPPED | OUTPATIENT
Start: 2023-09-24

## 2023-10-10 DIAGNOSIS — E03.9 HYPOTHYROIDISM, UNSPECIFIED TYPE: ICD-10-CM

## 2023-10-10 DIAGNOSIS — D63.8 ANEMIA OF CHRONIC DISEASE: ICD-10-CM

## 2023-10-10 DIAGNOSIS — N28.9 ACUTE RENAL INSUFFICIENCY: ICD-10-CM

## 2023-10-10 RX ORDER — LEVOTHYROXINE SODIUM 88 UG/1
TABLET ORAL
Qty: 90 TABLET | Refills: 3 | Status: SHIPPED | OUTPATIENT
Start: 2023-10-10

## 2023-10-27 ENCOUNTER — OFFICE VISIT (OUTPATIENT)
Dept: FAMILY MEDICINE | Facility: CLINIC | Age: 78
End: 2023-10-27
Payer: MEDICARE

## 2023-10-27 VITALS
OXYGEN SATURATION: 99 % | WEIGHT: 199.5 LBS | HEART RATE: 76 BPM | HEIGHT: 70 IN | BODY MASS INDEX: 28.56 KG/M2 | DIASTOLIC BLOOD PRESSURE: 84 MMHG | SYSTOLIC BLOOD PRESSURE: 126 MMHG

## 2023-10-27 DIAGNOSIS — I70.0 AORTIC ATHEROSCLEROSIS: ICD-10-CM

## 2023-10-27 DIAGNOSIS — Z95.5 S/P CORONARY ARTERY STENT PLACEMENT: ICD-10-CM

## 2023-10-27 DIAGNOSIS — E78.2 MIXED HYPERLIPIDEMIA: ICD-10-CM

## 2023-10-27 DIAGNOSIS — Z79.899 DRUG THERAPY: Primary | ICD-10-CM

## 2023-10-27 DIAGNOSIS — I10 ESSENTIAL HYPERTENSION: ICD-10-CM

## 2023-10-27 DIAGNOSIS — G47.33 OSA (OBSTRUCTIVE SLEEP APNEA): ICD-10-CM

## 2023-10-27 DIAGNOSIS — N18.31 STAGE 3A CHRONIC KIDNEY DISEASE: ICD-10-CM

## 2023-10-27 DIAGNOSIS — I48.91 ATRIAL FIBRILLATION WITH RVR: ICD-10-CM

## 2023-10-27 DIAGNOSIS — I65.29 CAROTID ATHEROSCLEROSIS, UNSPECIFIED LATERALITY: ICD-10-CM

## 2023-10-27 DIAGNOSIS — E11.69 TYPE 2 DIABETES MELLITUS WITH OTHER SPECIFIED COMPLICATION, WITHOUT LONG-TERM CURRENT USE OF INSULIN: ICD-10-CM

## 2023-10-27 PROCEDURE — 1160F PR REVIEW ALL MEDS BY PRESCRIBER/CLIN PHARMACIST DOCUMENTED: ICD-10-PCS | Mod: HCNC,CPTII,S$GLB, | Performed by: FAMILY MEDICINE

## 2023-10-27 PROCEDURE — 3288F FALL RISK ASSESSMENT DOCD: CPT | Mod: HCNC,CPTII,S$GLB, | Performed by: FAMILY MEDICINE

## 2023-10-27 PROCEDURE — 1160F RVW MEDS BY RX/DR IN RCRD: CPT | Mod: HCNC,CPTII,S$GLB, | Performed by: FAMILY MEDICINE

## 2023-10-27 PROCEDURE — 1101F PR PT FALLS ASSESS DOC 0-1 FALLS W/OUT INJ PAST YR: ICD-10-PCS | Mod: HCNC,CPTII,S$GLB, | Performed by: FAMILY MEDICINE

## 2023-10-27 PROCEDURE — 3079F PR MOST RECENT DIASTOLIC BLOOD PRESSURE 80-89 MM HG: ICD-10-PCS | Mod: HCNC,CPTII,S$GLB, | Performed by: FAMILY MEDICINE

## 2023-10-27 PROCEDURE — 99214 OFFICE O/P EST MOD 30 MIN: CPT | Mod: HCNC,S$GLB,, | Performed by: FAMILY MEDICINE

## 2023-10-27 PROCEDURE — 99214 PR OFFICE/OUTPT VISIT, EST, LEVL IV, 30-39 MIN: ICD-10-PCS | Mod: HCNC,S$GLB,, | Performed by: FAMILY MEDICINE

## 2023-10-27 PROCEDURE — 99999 PR PBB SHADOW E&M-EST. PATIENT-LVL IV: ICD-10-PCS | Mod: PBBFAC,HCNC,, | Performed by: FAMILY MEDICINE

## 2023-10-27 PROCEDURE — 1101F PT FALLS ASSESS-DOCD LE1/YR: CPT | Mod: HCNC,CPTII,S$GLB, | Performed by: FAMILY MEDICINE

## 2023-10-27 PROCEDURE — 1159F MED LIST DOCD IN RCRD: CPT | Mod: HCNC,CPTII,S$GLB, | Performed by: FAMILY MEDICINE

## 2023-10-27 PROCEDURE — 3079F DIAST BP 80-89 MM HG: CPT | Mod: HCNC,CPTII,S$GLB, | Performed by: FAMILY MEDICINE

## 2023-10-27 PROCEDURE — 1159F PR MEDICATION LIST DOCUMENTED IN MEDICAL RECORD: ICD-10-PCS | Mod: HCNC,CPTII,S$GLB, | Performed by: FAMILY MEDICINE

## 2023-10-27 PROCEDURE — 3074F PR MOST RECENT SYSTOLIC BLOOD PRESSURE < 130 MM HG: ICD-10-PCS | Mod: HCNC,CPTII,S$GLB, | Performed by: FAMILY MEDICINE

## 2023-10-27 PROCEDURE — 3288F PR FALLS RISK ASSESSMENT DOCUMENTED: ICD-10-PCS | Mod: HCNC,CPTII,S$GLB, | Performed by: FAMILY MEDICINE

## 2023-10-27 PROCEDURE — 99999 PR PBB SHADOW E&M-EST. PATIENT-LVL IV: CPT | Mod: PBBFAC,HCNC,, | Performed by: FAMILY MEDICINE

## 2023-10-27 PROCEDURE — 3074F SYST BP LT 130 MM HG: CPT | Mod: HCNC,CPTII,S$GLB, | Performed by: FAMILY MEDICINE

## 2023-10-27 PROCEDURE — 1126F PR PAIN SEVERITY QUANTIFIED, NO PAIN PRESENT: ICD-10-PCS | Mod: HCNC,CPTII,S$GLB, | Performed by: FAMILY MEDICINE

## 2023-10-27 PROCEDURE — 1126F AMNT PAIN NOTED NONE PRSNT: CPT | Mod: HCNC,CPTII,S$GLB, | Performed by: FAMILY MEDICINE

## 2023-10-27 RX ORDER — DAPAGLIFLOZIN 10 MG/1
10 TABLET, FILM COATED ORAL DAILY
Qty: 30 TABLET | Refills: 11 | Status: SHIPPED | OUTPATIENT
Start: 2023-10-27 | End: 2024-03-05

## 2023-10-27 RX ORDER — METOPROLOL SUCCINATE 50 MG/1
50 TABLET, EXTENDED RELEASE ORAL DAILY
COMMUNITY

## 2023-10-27 NOTE — PROGRESS NOTES
" THIS DOCUMENT WAS MADE IN PART WITH VOICE RECOGNITION SOFTWARE.  OCCASIONALLY THIS SOFTWARE WILL MISINTERPRET WORDS OR PHRASES.    Assessment and Plan:    1. Drug therapy  CBC Auto Differential    Ferritin    Iron and TIBC    Vitamin B12    Methylmalonic Acid, Serum      2. Type 2 diabetes mellitus with other specified complication, without long-term current use of insulin  dapagliflozin propanediol (FARXIGA) 10 mg tablet      3. Stage 3a chronic kidney disease  dapagliflozin propanediol (FARXIGA) 10 mg tablet      4. ROBBY (obstructive sleep apnea)        5. Essential hypertension        6. Mixed hyperlipidemia        7. Carotid atherosclerosis, unspecified laterality        8. S/P coronary artery stent placement        9. Atrial fibrillation with RVR        10. Aortic atherosclerosis            New medication Farxiga in place of metformin for chronic kidney disease with microalbuminuria.  Hopefully not too cost prohibitive     Other chronic conditions appear to be stable   Maintain follow-up every 6 months.  Low threshold for Nephrology referral    ______________________________________________________________________  Subjective:    Chief Complaint:  Chief Complaint   Patient presents with    Follow-up     4 month f/u         HPI:  Jadiel is a 77 y.o. year old     Patient here for general health maintenance/chronic condition review.  No specific complaints or concerns.  There is evidence of worsening of kidney function over time.  Noted to have microalbuminuria on recent lab test.  Avoiding anti-inflammatories.     History atrial fibrillation   Rx-Eliquis, Toprol XL 50   Cardiology- MD Ed   EP - MD Hector   "10% a fib" per Dr Tracy     Type 2 diabetes mellitus   Rx-metformin  Prev a1c : 5.8     Obstructive sleep apnea   CPAP intolerant   "Not candidate for Inspire" per SLENT      Dyslipidemia , bilateral carotid artery disease, CAD s/p PCI x2-3  Rx-atorvastatin 40 mg , Plavix, Zetia  No exertional CP / SOB "      Hypothyroidism   Rx-Synthroid 88 mcg   TSH nl      Essential hypertension   Rx- amlodipine 5 mg, Valsartan 160 mg, Metoprolol 50 mg      Anxiety   No current medication     Chronic kidney disease, proteinuria  Baseline creatinine- 1.4-1.6     History iron deficiency anemia  Baseline HCT 30-33   Taking OTC supplements      Persistent asthma   Rx-rescue inhaler      Past Medical History:  Past Medical History:   Diagnosis Date    Anemia     Anxiety     Atrial fibrillation     admitted to [facility] 18 for AF w RVR; no cardioversion needed; Dr Mendes EPS. Slowed down on its own and back to NSR; placed on eliquis.     Carotid artery plaque     bilateral    Diabetes mellitus, type 2     Essential hypertension     Hypertension     Hypothyroidism     Liver dysfunction     Methylenetetrahydrofolate reductase (MTHFR) gene mutation     Mixed hyperlipidemia     Obesity     ROBBY (obstructive sleep apnea)     Proteinuria     Valvular heart disease        Past Surgical History:  Past Surgical History:   Procedure Laterality Date    APPENDECTOMY  0624-0746    COLONOSCOPY N/A 2019    Procedure: COLONOSCOPY;  Surgeon: Jimenez Solomon Jr., MD;  Location: [facility] ENDO;  Service: Endoscopy;  Laterality: N/A;    CORONARY STENT PLACEMENT  2017    LAD by Dr Ward    ESOPHAGOGASTRODUODENOSCOPY N/A 2019    Procedure: EGD (ESOPHAGOGASTRODUODENOSCOPY);  Surgeon: Jimenez Solomon Jr., MD;  Location: [facility];  Service: Endoscopy;  Laterality: N/A;       Family History:  Family History   Problem Relation Age of Onset    Cancer Mother     Heart disease Mother     Heart disease Father     Arthritis Father     Hypertension Father     Cancer Sister         breast cancer    Diabetes Sister     Asthma Brother     COPD Brother     Diabetes Brother     Stroke Brother     Cancer Sister         leukemia    Early death Sister          at 64 of leukemia    Colon cancer Neg Hx     Crohn's disease Neg Hx     Esophageal cancer Neg Hx      Ulcerative colitis Neg Hx     Stomach cancer Neg Hx        Social History:  Social History     Socioeconomic History    Marital status:      Spouse name: Gwen   Occupational History    Occupation:  HS.   Tobacco Use    Smoking status: Never    Smokeless tobacco: Never   Substance and Sexual Activity    Alcohol use: Yes     Alcohol/week: 16.0 standard drinks of alcohol     Types: 8 Glasses of wine, 8 Shots of liquor per week    Drug use: No     Social Determinants of Health     Financial Resource Strain: Low Risk  (9/14/2023)    Overall Financial Resource Strain (CARDIA)     Difficulty of Paying Living Expenses: Not very hard   Food Insecurity: No Food Insecurity (9/14/2023)    Hunger Vital Sign     Worried About Running Out of Food in the Last Year: Never true     Ran Out of Food in the Last Year: Never true   Transportation Needs: Unknown (9/14/2023)    PRAPARE - Transportation     Lack of Transportation (Medical): No   Physical Activity: Insufficiently Active (9/14/2023)    Exercise Vital Sign     Days of Exercise per Week: 3 days     Minutes of Exercise per Session: 10 min   Stress: No Stress Concern Present (9/14/2023)    [employer] [place] [employer] - Occupational Stress Questionnaire     Feeling of Stress : Only a little   Social Connections: Unknown (9/14/2023)    Social Connection and Isolation Panel [NHANES]     Frequency of Communication with Friends and Family: More than three times a week     Frequency of Social Gatherings with Friends and Family: Twice a week     Active Member of Clubs or Organizations: No     Attends Club or Organization Meetings: Patient refused     Marital Status:    Housing Stability: Unknown (9/14/2023)    Housing Stability Vital Sign     Unable to Pay for Housing in the Last Year: No     Number of Places Lived in the Last Year: 1       Medications:  Current Outpatient Medications on File Prior to Visit   Medication Sig Dispense Refill     albuterol (PROVENTIL/VENTOLIN HFA) 90 mcg/actuation inhaler INHALE 2 PUFFS INTO THE LUNGS EVERY 6 HOURS AS NEEDED FOR WHEEZING 54 g 3    albuterol-ipratropium (DUO-NEB) 2.5 mg-0.5 mg/3 mL nebulizer solution Take 3 mLs by nebulization every 6 (six) hours as needed for Wheezing. Rescue 120 each 3    amLODIPine (NORVASC) 5 MG tablet TAKE 1 TABLET(5 MG) BY MOUTH EVERY DAY 90 tablet 2    atorvastatin (LIPITOR) 40 MG tablet Take 1 tablet (40 mg total) by mouth once daily. 90 tablet 1    blood sugar diagnostic Strp To check BG once daily before a meal, to use with insurance preferred meter 100 strip 3    clopidogreL (PLAVIX) 75 mg tablet Take 75 mg by mouth once daily.      ELIQUIS 5 mg Tab Take 1 tablet (5 mg total) by mouth 2 (two) times daily. 60 tablet 3    ezetimibe (ZETIA) 10 mg tablet TAKE ONE-HALF TABLET BY MOUTH DAILY 45 tablet 3    lancets Misc To check BG once daily before a meal, to use with insurance preferred meter 100 each 3    levothyroxine (SYNTHROID) 88 MCG tablet TAKE 1 TABLET(88 MCG) BY MOUTH BEFORE BREAKFAST 90 tablet 3    metoprolol succinate (TOPROL-XL) 50 MG 24 hr tablet Take 50 mg by mouth once daily.      nitroGLYCERIN (NITROSTAT) 0.4 MG SL tablet DISSOLVE ONE T SUBLINGUALLY Q 5 MINUTES X 3 DOSES PRN FOR CHEST PAIN  0    SPIRIVA WITH HANDIHALER 18 mcg inhalation capsule as needed.       valsartan (DIOVAN) 160 MG tablet Take 160 mg by mouth 2 (two) times daily.      vitamin renal formula, B-complex-vitamin c-folic acid, (NEPHROCAP) 1 mg Cap Take 1 capsule by mouth once daily. 90 capsule 3    [DISCONTINUED] losartan (COZAAR) 100 MG tablet Take 100 mg by mouth once daily.      [DISCONTINUED] metFORMIN (GLUCOPHAGE) 500 MG tablet Take 1 tablet (500 mg total) by mouth 2 (two) times daily. 180 tablet 1    [DISCONTINUED] doxazosin (CARDURA) 1 MG tablet TAKE ONE TABLET BY MOUTH EVERY DAY, BETWEEN 4 AND 6PM. AFTER 1 WEEK IF NEEDED CAN INCREASE TO 2MG DAILY BETWEEN 4-6PM (Patient not taking: Reported on  "10/27/2023) 30 tablet 2     No current facility-administered medications on file prior to visit.       Allergies:  Multaq [dronedarone]    Immunizations:  Immunization History   Administered Date(s) Administered    COVID-19, MRNA, LN-S, PF (Pfizer) (Purple Cap) 01/10/2021, 01/31/2021, 08/19/2021    Influenza (FLUAD) - Quadrivalent - Adjuvanted - PF *Preferred* (65+) 09/24/2020    Influenza - High Dose - PF (65 years and older) 09/10/2018, 03/05/2020    Pneumococcal Conjugate - 13 Valent 09/10/2018    Pneumococcal Polysaccharide - 23 Valent 06/27/2023    Tdap 08/24/2022       Review of Systems:  Review of Systems   All other systems reviewed and are negative.      Objective:    Vitals:  Vitals:    10/27/23 1537   BP: 126/84   Pulse: 76   SpO2: 99%   Weight: 90.5 kg (199 lb 8.3 oz)   Height: 5' 10" (1.778 m)   PainSc: 0-No pain       Physical Exam  Vitals reviewed.   Constitutional:       General: He is not in acute distress.  HENT:      Head: Normocephalic and atraumatic.   Eyes:      Pupils: Pupils are equal, round, and reactive to light.   Cardiovascular:      Rate and Rhythm: Normal rate and regular rhythm.      Heart sounds: No murmur heard.     No friction rub.   Pulmonary:      Effort: Pulmonary effort is normal.      Breath sounds: Normal breath sounds.   Abdominal:      General: Bowel sounds are normal. There is no distension.      Palpations: Abdomen is soft.      Tenderness: There is no abdominal tenderness.   Musculoskeletal:      Cervical back: Neck supple.   Skin:     General: Skin is warm and dry.      Findings: No rash.   Psychiatric:         Behavior: Behavior normal.             Reji Lemos MD  Family Medicine      "

## 2023-10-30 ENCOUNTER — PATIENT OUTREACH (OUTPATIENT)
Dept: ADMINISTRATIVE | Facility: HOSPITAL | Age: 78
End: 2023-10-30
Payer: MEDICARE

## 2023-10-30 NOTE — PROGRESS NOTES
Population Health Chart Review & Patient Outreach Details    Outreach Performed: NO    Further Action Needed If Patient Returns Outreach:            Updates Requested / Reviewed:      Care Team Updated         Health Maintenance Topics     Health Maintenance Due   Topic Date Due    Shingles Vaccine (1 of 2) Never done    RSV Vaccine (Age 60+) (1 - 1-dose 60+ series) Never done    Eye Exam  10/26/2023            Breast Cancer Screening   Not Addressed     N/A             Cervical Cancer Screening   Not Addressed     N/A            Colorectal Cancer Screening  Not Addressed     N/A            Eye Exam  Addressed     External Records Requested & Care Team Updated if Applicable            Blood Pressure Control  Not Addressed     N/A             HbA1c & Other Labs  Not Addressed     N/A            Primary Care Appointment  Not Addressed     N/A            Medication Adherence / Statin Use  Not Addressed     N/A            Osteoporosis Screening  Not Addressed     N/A         Additional Notes:

## 2023-10-30 NOTE — LETTER
FAX      AUTHORIZATION FOR RELEASE OF   CONFIDENTIAL INFORMATION        Dear Dr Goldsmith,      We are seeing Jadiel Rossi, date of birth 1945, in the clinic at [facility]. Reji Lemos MD is the patient's PCP. Jadiel Rossi has an outstanding lab/procedure at the time we reviewed their chart. In order to help keep their health information updated, Jadiel Rossi has authorized us to request the following medical record(s):     ()  MAMMOGRAM (WITHIN 2 YRS)  ()  COLON/PATH W/RECALL (WITHIN 10 YRS)     ()  PAP SMEAR (WITHIN 3 YRS)      ()  OUTSIDE LAB RESULTS    ()  DEXA SCAN (WITHIN 3 YRS)      (x) DM EYE EXAM (WITHIN 48 MONTHS)          ()  FOOT EXAM (WITHIN 1yr.)          () OUTSIDE IMMUNIZATIONS    ()  OTHER                                   Please fax records to [facility] [phone].     If you have any questions, please contact Jae at [phone]                Patient Name: Jadiel Rossi  : 1945  Patient Phone #: [phone]

## 2023-11-01 ENCOUNTER — PATIENT OUTREACH (OUTPATIENT)
Dept: ADMINISTRATIVE | Facility: HOSPITAL | Age: 78
End: 2023-11-01
Payer: MEDICARE

## 2023-11-01 NOTE — LETTER
FAX      AUTHORIZATION FOR RELEASE OF   CONFIDENTIAL INFORMATION        Dear Dr Ramirez,      We are seeing Jadiel Rossi, date of birth 1945, in the clinic at [facility]. Reji Lemos MD is the patient's PCP. Jadiel Rossi has an outstanding lab/procedure at the time we reviewed their chart. In order to help keep their health information updated, Jadiel Rossi has authorized us to request the following medical record(s):     ()  MAMMOGRAM (WITHIN 2 YRS)  ()  COLON/PATH W/RECALL (WITHIN 10 YRS)     ()  PAP SMEAR (WITHIN 3 YRS)      ()  OUTSIDE LAB RESULTS    ()  DEXA SCAN (WITHIN 3 YRS)      (x) DM EYE EXAM (WITHIN 48 MONTHS)          ()  FOOT EXAM (WITHIN 1yr.)          () OUTSIDE IMMUNIZATIONS    ()  OTHER                                   Please fax records to [facility] [phone].     If you have any questions, please contact Jae at [phone]                                                   Jadiel Rossi  MRN: [id number]  : 1945  Age: 77 y.o.  Sex: male         Patient/Legal Guardian Signature  This signature was collected at 2023    Jadiel Rossi       _______________________________   Printed Name/Relationship to Patient      Consent for Examination and Treatment: I hereby authorize the providers and employees of [facility] ([facility]) to provide medical treatment/services which includes, but is not limited to, performing and administering tests and diagnostic procedures that are deemed necessary, including, but not limited to, imaging examinations, blood tests and other laboratory procedures as may be required by the hospital, clinic, or may be ordered by my physician(s) or persons working under the general and/or special instructions of my physician(s).      I understand and agree that this consent covers all authorized persons, including but not limited to physicians, residents, nurse practitioners, physicians'  assistants, specialists, consultants, student nurses, and independently contracted physicians, who are called upon by the physician in charge, to carry out the diagnostic procedures and medical or surgical treatment.     I hereby authorize [facility] to retain or dispose of any specimens or tissue, should there be such remaining from any test or procedure.     I hereby authorize and give consent for [facility] providers and employees to take photographs, images or videotapes of such diagnostic, surgical or treatment procedures of Patient as may be required by [facility] or as may be ordered by a physician. I further acknowledge and agree that [facility] may use cameras or other devices for patient monitoring.     I am aware that the practice of medicine is not an exact science, and I acknowledge that no guarantees have been made to me as to the outcome of any tests, procedures or treatment.     Authorization for Release of Information: I understand that my insurance company and/or their agents may need information necessary to make determinations about payment/reimbursement. I hereby provide authorization to release to all insurance companies, their successors, assignees, other parties with whom they may have contracted, or others acting on their behalf, that are involved with payment for any hospital and/or clinic charges incurred by the patient, any information that they request and deem necessary for payment/reimbursement, and/or quality review.  I further authorize the release of my health information to physicians or other health care practitioners on staff who are involved in my health care now and in the future, and to other health care providers, entities, or institutions for the purpose of my continued care and treatment, including referrals.     REGISTRATION AUTHORIZATION  Form No. 82541 (Rev. 7/13/2022)       Medicare Patient's Certification and Authorization to Release Information and Payment Request:  I certify  that the information given by me in applying for payment under Title XVIII of the Social Security Act is correct. I authorize any mccracken of medical or other information about me to release to the Social SecurityAdministration, or its intermediaries or carriers, any information needed for this or a related Medicare claim. I request that payment of authorized benefits be made on my behalf.     Assignment of Insurance Benefits:   I hereby authorize any and all insurance companies, health plans, defined   benefit plans, health insurers or any entity that is or may be responsible for payment of my medical expenses to pay all hospital and medical benefits now due, and to become due and payable to me under any hospital benefits, sick benefits, injury benefits or any other benefit for services rendered to me, including Major Medical Benefits, direct to [facility] and all independently contracted physicians. I assign any and all rights that I may have against any and all insurance companies, health plans, defined benefit plans, health insurers or any entity that is or may be responsible for payment of my medical expenses, including, but not limited to any right to appeal a denial of a claim, any right to bring any action, lawsuit, administrative proceeding, or other cause of action on my behalf. I specifically assign my right to pursue litigation against any and all insurance companies, health plans, defined benefit plans, health insurers or any entity that is or may be responsible for payment of my medical expenses based upon a refusal to pay charges.            E. Valuables: It is understood and agreed that [facility] is not liable for the damage to or loss of any money, jewelry,   documents, dentures, eye glasses, hearing aids, prosthetics, or other property of value.     F. Computer Equipment: I understand and agree that should I choose to use computer equipment owned by [facility] or if I choose to access the Internet via  [facility] [employer] network, I do so at my own risk. [facility] is not responsible for any damage to my computer equipment or to any damages of any type that might arise from my loss of equipment or data.     G. Acceptance of Financial Responsibility:  I agree that in consideration of the services and   supplies that have been   or will be furnished to the patient, I am hereby obligated to pay all charges made for or on the account of the patient according to the standard rates (in effect at the time the services and supplies are delivered) established by [facility], including its Patient Financial Assistance Policy to the extent it is applicable. I understand that I am responsible for all charges, or portions thereof, not covered by insurance or other sources. Patient refunds will be distributed only after balances at all [facility] facilities are paid.     H. Communication Authorization:  I hereby authorize [facility] and its representatives, along with any billing service   or  who may work on their behalf, to contact me on   my cell phone and/or home phone using pre- recorded messages, artificial voice messages, automatic telephone dialing devices or other computer assisted technology, or by electronic      mail, text messaging, or by any other form of electronic communication. This includes, but is not limited to, appointment reminders, yearly physical exam reminders, preventive care reminders, patient campaigns, welcome calls, and calls about account balances on my account or any account on which I am listed as a guarantor. I understand I have the right to opt out of these communications at any time.      Relationship  Between  Facility and  Provider:      I understand that some, but not all, providers furnishing services to the patient are not employees or agents of [facility]. The patient is under the care and supervision of his/her attending physician, and it is the responsibility of the facility and its nursing  staff to carry out the instructions of such physicians. It is the responsibility of the patient's physician/designee to obtain the patient's informed consent, when required, for medical or surgical treatment, special diagnostic or therapeutic procedures, or hospital services rendered for the patient under the special instructions of the physician/designee.     REGISTRATION AUTHORIZATION  Form No. 17149 (Rev. 7/13/2022)      Notice of Privacy Practices: I acknowledge I have received a copy of [facility]'s Notice of Privacy Practices.     Facility  Directory: I have discussed with the organization my desire to be either included or excluded  in the facility directory in the event of my being an inpatient at an [facility] facility. I understand that if my choice is to opt-out of being identified in the facility directory that the facility will not provide any information about me such as my condition (e.g. fair, stable, etc.) or my location in the facility (e.g., room number, department).     Immunizations: [facility] shares immunization information with state sponsored health departments to help you and your doctor keep track of your immunization records. By signing, you consent to have this information shared with the health department in your state:                                [place] - LINKS ([place] Immunization Network for Kids Statewide)                                [place] - MIIX ([place] Immunization Information eXchange)                                [place] - ImmPRINT (Immunization Patient Registry with Integrated Technology)     TERM: This authorization is valid for this and subsequent care/treatment I receive at [facility] and will remain valid unless/until revoked in writing by me.     [facility]: As used in this document, [facility] means all [facility] owned and managed facilities, including, but not limited to, all health centers, surgery centers, clinics, urgent care centers, and  hospitals.         [facility] complies with applicable Federal civil rights laws and does not discriminate on the basis of race, color, national origin, age, disability, or sex.  ATENCIÓN: si habla jonnysweta, tiene a mcdaniels disposición servicios gratuitos de asistencia lingüística. Lltrevor al [phone].  CHÚ Ý: N?u b?n nói Ti?ng Vi?t, có các d?ch v? h? tr? ngôn ng? mi?n phí dành cho b?n. G?i s? [phone].        REGISTRATION AUTHORIZATION  Form No. 96997 (Rev. 2022)       Patient Name: Jadiel Rossi  : 1945  Patient Phone #: [phone]

## 2023-11-01 NOTE — PROGRESS NOTES
Population Health Chart Review & Patient Outreach Details    Outreach Performed: NO    Additional Pop Health Notes:           Updates Requested / Reviewed:      Other              Health Maintenance Topics Overdue:    Health Maintenance Due   Topic Date Due    Shingles Vaccine (1 of 2) Never done    RSV Vaccine (Age 60+) (1 - 1-dose 60+ series) Never done    Eye Exam  10/26/2023         Health Maintenance Topic(s) Outreach Outcomes & Actions Taken:    Eye Exam - Outreach Outcomes & Actions Taken  : External Records Requested & Care Team Updated if Applicable

## 2024-02-16 RX ORDER — METFORMIN HYDROCHLORIDE 500 MG/1
500 TABLET ORAL 2 TIMES DAILY
Qty: 180 TABLET | Refills: 1 | OUTPATIENT
Start: 2024-02-16

## 2024-02-16 NOTE — TELEPHONE ENCOUNTER
Provider Staff:  Action required for this patient    Requires labs      Please see care gap opportunities below in Care Due Message.    Thanks!  [facility]     Appointments      Date Provider   Last Visit   10/27/2023 Reji Lemos MD   Next Visit   3/5/2024 Reji Lemos MD     Refill Decision Note   Jadiel Rossi  is requesting a refill authorization.  Brief Assessment and Rationale for Refill:  Defer     Medication Therapy Plan:  PT IS NOW ON FARXIGA      Comments:     Note composed:2:14 PM 02/16/2024

## 2024-02-16 NOTE — TELEPHONE ENCOUNTER
Care Due:                  Date            Visit Type   Department     Provider  --------------------------------------------------------------------------------                                EP -                              PRIMARY      [facility] FAMILY  Last Visit: 10-      CARE ([facility])   SHIRA Lemos                               -                              [facility]  Next Visit: 03-      CARE ([facility])   MEDICINE       Reji Lemos                                                            Last  Test          Frequency    Reason                     Performed    Due Date  --------------------------------------------------------------------------------    HBA1C.......  6 months...  dapagliflozin............  10-   04-    [employer] Embedded Care Due Messages. Reference number: [id number].   2/16/2024 8:04:40 AM CST

## 2024-02-20 LAB
LEFT EYE DM RETINOPATHY: NEGATIVE
RIGHT EYE DM RETINOPATHY: NEGATIVE

## 2024-02-26 DIAGNOSIS — Z95.5 S/P CORONARY ARTERY STENT PLACEMENT: ICD-10-CM

## 2024-02-26 DIAGNOSIS — E78.2 MIXED HYPERLIPIDEMIA: ICD-10-CM

## 2024-02-26 RX ORDER — ATORVASTATIN CALCIUM 40 MG/1
40 TABLET, FILM COATED ORAL DAILY
Qty: 90 TABLET | Refills: 1 | Status: SHIPPED | OUTPATIENT
Start: 2024-02-26 | End: 2024-05-30

## 2024-02-26 NOTE — TELEPHONE ENCOUNTER
No care due was identified.  [facility] Embedded Care Due Messages. Reference number: [id number].   2/26/2024 1:54:30 PM CST

## 2024-02-26 NOTE — TELEPHONE ENCOUNTER
----- Message from Antonio Wheeler sent at 2/26/2024 11:44 AM CST -----  Regarding: refill  Contact: patient  Type:  RX Refill Request    Who Called: patient  Refill or New Rx:refill/ new order/ new MD/ please change all active medication to MD Lemos  RX Name and Strength:atorvastatin (LIPITOR) 40 MG tablet  How is the patient currently taking it? (ex. 1XDay):1  Is this a 30 day or 90 day RX:90  Preferred Pharmacy with phone number:  [id number] [place] [address] & [address] [address]  Phone: [phone] Fax: [phone]  Local or Mail Order:local  Ordering Provider:Reji Lemos  Would the patient rather a call back or a response via [facility]? New order/ New MD  Best Call Back Number:[phone]  Additional Information: please call patient / questions

## 2024-02-29 ENCOUNTER — LAB VISIT (OUTPATIENT)
Dept: LAB | Facility: HOSPITAL | Age: 79
End: 2024-02-29
Attending: FAMILY MEDICINE
Payer: MEDICARE

## 2024-02-29 DIAGNOSIS — N18.31 STAGE 3A CHRONIC KIDNEY DISEASE: ICD-10-CM

## 2024-02-29 DIAGNOSIS — E11.69 TYPE 2 DIABETES MELLITUS WITH OTHER SPECIFIED COMPLICATION, WITHOUT LONG-TERM CURRENT USE OF INSULIN: ICD-10-CM

## 2024-02-29 DIAGNOSIS — Z79.899 DRUG THERAPY: ICD-10-CM

## 2024-02-29 DIAGNOSIS — E03.9 HYPOTHYROIDISM, UNSPECIFIED TYPE: ICD-10-CM

## 2024-02-29 LAB
ALBUMIN SERPL BCP-MCNC: 3.7 G/DL (ref 3.5–5.2)
ALP SERPL-CCNC: 55 U/L (ref 55–135)
ALT SERPL W/O P-5'-P-CCNC: 30 U/L (ref 10–44)
ANION GAP SERPL CALC-SCNC: 12 MMOL/L (ref 8–16)
AST SERPL-CCNC: 26 U/L (ref 10–40)
BASOPHILS # BLD AUTO: 0.08 K/UL (ref 0–0.2)
BASOPHILS NFR BLD: 0.9 % (ref 0–1.9)
BILIRUB SERPL-MCNC: 0.8 MG/DL (ref 0.1–1)
BUN SERPL-MCNC: 35 MG/DL (ref 8–23)
CALCIUM SERPL-MCNC: 10.5 MG/DL (ref 8.7–10.5)
CHLORIDE SERPL-SCNC: 102 MMOL/L (ref 95–110)
CO2 SERPL-SCNC: 27 MMOL/L (ref 23–29)
CREAT SERPL-MCNC: 1.8 MG/DL (ref 0.5–1.4)
DIFFERENTIAL METHOD BLD: ABNORMAL
EOSINOPHIL # BLD AUTO: 0.5 K/UL (ref 0–0.5)
EOSINOPHIL NFR BLD: 5.8 % (ref 0–8)
ERYTHROCYTE [DISTWIDTH] IN BLOOD BY AUTOMATED COUNT: 13.9 % (ref 11.5–14.5)
EST. GFR  (NO RACE VARIABLE): 38.3 ML/MIN/1.73 M^2
ESTIMATED AVG GLUCOSE: 123 MG/DL (ref 68–131)
FERRITIN SERPL-MCNC: 147 NG/ML (ref 20–300)
GLUCOSE SERPL-MCNC: 101 MG/DL (ref 70–110)
HBA1C MFR BLD: 5.9 % (ref 4–5.6)
HCT VFR BLD AUTO: 31.9 % (ref 40–54)
HGB BLD-MCNC: 10.2 G/DL (ref 14–18)
IMM GRANULOCYTES # BLD AUTO: 0.02 K/UL (ref 0–0.04)
IMM GRANULOCYTES NFR BLD AUTO: 0.2 % (ref 0–0.5)
IRON SERPL-MCNC: 57 UG/DL (ref 45–160)
LYMPHOCYTES # BLD AUTO: 1.7 K/UL (ref 1–4.8)
LYMPHOCYTES NFR BLD: 18.3 % (ref 18–48)
MCH RBC QN AUTO: 30.7 PG (ref 27–31)
MCHC RBC AUTO-ENTMCNC: 32 G/DL (ref 32–36)
MCV RBC AUTO: 96 FL (ref 82–98)
MONOCYTES # BLD AUTO: 1.1 K/UL (ref 0.3–1)
MONOCYTES NFR BLD: 11.5 % (ref 4–15)
NEUTROPHILS # BLD AUTO: 5.9 K/UL (ref 1.8–7.7)
NEUTROPHILS NFR BLD: 63.3 % (ref 38–73)
NRBC BLD-RTO: 0 /100 WBC
PLATELET # BLD AUTO: 215 K/UL (ref 150–450)
PMV BLD AUTO: 10.3 FL (ref 9.2–12.9)
POTASSIUM SERPL-SCNC: 4.9 MMOL/L (ref 3.5–5.1)
PROT SERPL-MCNC: 7.1 G/DL (ref 6–8.4)
RBC # BLD AUTO: 3.32 M/UL (ref 4.6–6.2)
SATURATED IRON: 15 % (ref 20–50)
SODIUM SERPL-SCNC: 141 MMOL/L (ref 136–145)
TOTAL IRON BINDING CAPACITY: 374 UG/DL (ref 250–450)
TRANSFERRIN SERPL-MCNC: 253 MG/DL (ref 200–375)
TSH SERPL DL<=0.005 MIU/L-ACNC: 2.31 UIU/ML (ref 0.4–4)
VIT B12 SERPL-MCNC: 395 PG/ML (ref 210–950)
WBC # BLD AUTO: 9.25 K/UL (ref 3.9–12.7)

## 2024-02-29 PROCEDURE — 83540 ASSAY OF IRON: CPT | Mod: HCNC | Performed by: FAMILY MEDICINE

## 2024-02-29 PROCEDURE — 80053 COMPREHEN METABOLIC PANEL: CPT | Mod: HCNC | Performed by: FAMILY MEDICINE

## 2024-02-29 PROCEDURE — 84443 ASSAY THYROID STIM HORMONE: CPT | Mod: HCNC | Performed by: FAMILY MEDICINE

## 2024-02-29 PROCEDURE — 83921 ORGANIC ACID SINGLE QUANT: CPT | Mod: HCNC | Performed by: FAMILY MEDICINE

## 2024-02-29 PROCEDURE — 85025 COMPLETE CBC W/AUTO DIFF WBC: CPT | Mod: HCNC | Performed by: FAMILY MEDICINE

## 2024-02-29 PROCEDURE — 82607 VITAMIN B-12: CPT | Mod: HCNC | Performed by: FAMILY MEDICINE

## 2024-02-29 PROCEDURE — 82728 ASSAY OF FERRITIN: CPT | Mod: HCNC | Performed by: FAMILY MEDICINE

## 2024-02-29 PROCEDURE — 83036 HEMOGLOBIN GLYCOSYLATED A1C: CPT | Mod: HCNC | Performed by: FAMILY MEDICINE

## 2024-02-29 PROCEDURE — 36415 COLL VENOUS BLD VENIPUNCTURE: CPT | Mod: HCNC,PN | Performed by: FAMILY MEDICINE

## 2024-03-05 ENCOUNTER — TELEPHONE (OUTPATIENT)
Dept: PHARMACY | Facility: CLINIC | Age: 79
End: 2024-03-05
Payer: MEDICARE

## 2024-03-05 ENCOUNTER — OFFICE VISIT (OUTPATIENT)
Dept: FAMILY MEDICINE | Facility: CLINIC | Age: 79
End: 2024-03-05
Payer: MEDICARE

## 2024-03-05 VITALS
BODY MASS INDEX: 30.19 KG/M2 | WEIGHT: 210.88 LBS | DIASTOLIC BLOOD PRESSURE: 64 MMHG | HEART RATE: 67 BPM | HEIGHT: 70 IN | SYSTOLIC BLOOD PRESSURE: 162 MMHG | OXYGEN SATURATION: 97 %

## 2024-03-05 DIAGNOSIS — N18.32 STAGE 3B CHRONIC KIDNEY DISEASE: ICD-10-CM

## 2024-03-05 DIAGNOSIS — G47.33 OSA (OBSTRUCTIVE SLEEP APNEA): ICD-10-CM

## 2024-03-05 DIAGNOSIS — E78.2 MIXED HYPERLIPIDEMIA: ICD-10-CM

## 2024-03-05 DIAGNOSIS — I38 VALVULAR HEART DISEASE: ICD-10-CM

## 2024-03-05 DIAGNOSIS — N18.31 STAGE 3A CHRONIC KIDNEY DISEASE: ICD-10-CM

## 2024-03-05 DIAGNOSIS — I10 ESSENTIAL HYPERTENSION: ICD-10-CM

## 2024-03-05 DIAGNOSIS — Z15.89 METHYLENETETRAHYDROFOLATE REDUCTASE (MTHFR) GENE MUTATION: ICD-10-CM

## 2024-03-05 DIAGNOSIS — E11.69 TYPE 2 DIABETES MELLITUS WITH OTHER SPECIFIED COMPLICATION, WITHOUT LONG-TERM CURRENT USE OF INSULIN: ICD-10-CM

## 2024-03-05 DIAGNOSIS — I70.0 AORTIC ATHEROSCLEROSIS: ICD-10-CM

## 2024-03-05 DIAGNOSIS — I48.91 ATRIAL FIBRILLATION WITH RVR: Primary | ICD-10-CM

## 2024-03-05 DIAGNOSIS — D50.9 IRON DEFICIENCY ANEMIA, UNSPECIFIED IRON DEFICIENCY ANEMIA TYPE: ICD-10-CM

## 2024-03-05 DIAGNOSIS — I65.29 CAROTID ATHEROSCLEROSIS, UNSPECIFIED LATERALITY: ICD-10-CM

## 2024-03-05 DIAGNOSIS — Z95.5 S/P CORONARY ARTERY STENT PLACEMENT: ICD-10-CM

## 2024-03-05 PROBLEM — N18.2 STAGE 2 CHRONIC KIDNEY DISEASE: Status: RESOLVED | Noted: 2020-04-28 | Resolved: 2024-03-05

## 2024-03-05 PROCEDURE — 1101F PT FALLS ASSESS-DOCD LE1/YR: CPT | Mod: HCNC,CPTII,S$GLB, | Performed by: FAMILY MEDICINE

## 2024-03-05 PROCEDURE — 1125F AMNT PAIN NOTED PAIN PRSNT: CPT | Mod: HCNC,CPTII,S$GLB, | Performed by: FAMILY MEDICINE

## 2024-03-05 PROCEDURE — 3288F FALL RISK ASSESSMENT DOCD: CPT | Mod: HCNC,CPTII,S$GLB, | Performed by: FAMILY MEDICINE

## 2024-03-05 PROCEDURE — 3078F DIAST BP <80 MM HG: CPT | Mod: HCNC,CPTII,S$GLB, | Performed by: FAMILY MEDICINE

## 2024-03-05 PROCEDURE — 99999 PR PBB SHADOW E&M-EST. PATIENT-LVL IV: CPT | Mod: PBBFAC,HCNC,, | Performed by: FAMILY MEDICINE

## 2024-03-05 PROCEDURE — 1160F RVW MEDS BY RX/DR IN RCRD: CPT | Mod: HCNC,CPTII,S$GLB, | Performed by: FAMILY MEDICINE

## 2024-03-05 PROCEDURE — 3077F SYST BP >= 140 MM HG: CPT | Mod: HCNC,CPTII,S$GLB, | Performed by: FAMILY MEDICINE

## 2024-03-05 PROCEDURE — 1159F MED LIST DOCD IN RCRD: CPT | Mod: HCNC,CPTII,S$GLB, | Performed by: FAMILY MEDICINE

## 2024-03-05 PROCEDURE — 99214 OFFICE O/P EST MOD 30 MIN: CPT | Mod: HCNC,S$GLB,, | Performed by: FAMILY MEDICINE

## 2024-03-05 PROCEDURE — G2211 COMPLEX E/M VISIT ADD ON: HCPCS | Mod: HCNC,S$GLB,, | Performed by: FAMILY MEDICINE

## 2024-03-05 RX ORDER — HYDROCHLOROTHIAZIDE 12.5 MG/1
12.5 TABLET ORAL 2 TIMES DAILY
COMMUNITY
Start: 2024-02-21

## 2024-03-05 RX ORDER — METFORMIN HYDROCHLORIDE 500 MG/1
500 TABLET ORAL 2 TIMES DAILY
COMMUNITY
Start: 2024-02-09 | End: 2024-05-24

## 2024-03-05 NOTE — TELEPHONE ENCOUNTER
I have reached out to Jadiel Rossi to inform him of the [employer]  application process for Eliquis and Jardiance and whats required to apply.  Jadiel Rossi did not answer. I left a voicemail and mailed a letter introducing him to the pharmacy patient assistance program. I will follow up in 5 business days.

## 2024-03-05 NOTE — LETTER
March 5, 2024    [facility]ad[facility]  [address] [facility] [facility] [address]             [facility]  [address]  [facility] [address]  Fax: [phone]   Dear Mr. Rossi,    My Name is Diana Membreno. I am a Pharmacy Technician reaching out on behalf of [facility]s Pharmacy Patient Assistance Team after receiving a referral from your provider inquiring about assistance with your medications. I tried to call you on 3/5/2024 but was unable to reach you. Our goal is to assist qualified patients with financial assistance for their medications to better help you achieve your health goals!    Please note that enrollment and eligibility into available support will require the following documents:    Proof of household Income( such as social security statement, 1099 form, pension statement or 3 consecutive pay stubs  Copy of all insurance cards (front and back)  Print out from your insurance or pharmacy showing how much you have spent on prescriptions this year  Signed and dated HIPAA /Patient Information Forms       Please reach out to my phone number below if you are still in need of assistance with your medications. We will attempt to reach out to you through [employer] or via phone call again in 5 business days. We look forward to hearing from you soon!    Thank you for choosing [facility] for your healthcare needs    Sincerely  Diana Membreno  Pharmacy Patient Assistance  [address]  [address]  [place] [address]  Phone: [phone]  Fax: [phone]  Email: [email or web address]

## 2024-03-05 NOTE — PROGRESS NOTES
" THIS DOCUMENT WAS MADE IN PART WITH VOICE RECOGNITION SOFTWARE.  OCCASIONALLY THIS SOFTWARE WILL MISINTERPRET WORDS OR PHRASES.    Assessment and Plan:    1. Atrial fibrillation with RVR  Ambulatory referral/consult to Pharmacy Assistance      2. Aortic atherosclerosis        3. Carotid atherosclerosis, unspecified laterality        4. Essential hypertension        5. Mixed hyperlipidemia        6. S/P coronary artery stent placement        7. Valvular heart disease        8. Stage 3a chronic kidney disease        9. Iron deficiency anemia, unspecified iron deficiency anemia type        10. Type 2 diabetes mellitus with other specified complication, without long-term current use of insulin  empagliflozin (JARDIANCE) 10 mg tablet    Ambulatory referral/consult to Pharmacy Assistance      11. Methylenetetrahydrofolate reductase (MTHFR) gene mutation        12. ROBBY (obstructive sleep apnea)        13. Stage 3b chronic kidney disease            Referral to pharmacy assistance program with Jardiance, needs optimization of diabetes medicine to assist with diabetic nephropathy and improving A1c.    Other chronic conditions appear to be stable     Counseled on eye exam recommendation    ______________________________________________________________________  Subjective:    Chief Complaint:  Chief Complaint   Patient presents with    Follow-up        HPI:  Jadiel is a 78 y.o. year old       Follow-up new medication Farxiga for diabetes, diabetic related nephropathy  Exchanged metformin for Farxiga at prior visit      History atrial fibrillation   Rx-Eliquis, Toprol XL 50   Cardiology- MD Ed   EP - MD Hector   "10% a fib" per Dr Tracy     Type 2 diabetes mellitus   Rx : Farxiga  Prev Rx-metformin  Prev a1c : 5.9     Obstructive sleep apnea   CPAP intolerant   "Not candidate for Inspire" per [facility]      Dyslipidemia , bilateral carotid artery disease, CAD s/p PCI x2-3  Rx-atorvastatin 40 mg , Zetia  No exertional CP / " SOB  Cardiology : MD Ed       Hypothyroidism   Rx-Synthroid 88 mcg   TSH nl      Essential hypertension   Rx- amlodipine 5 mg, Valsartan 160 mg, Metoprolol 50 mg        Anxiety   No current medication     Chronic kidney disease, proteinuria  Baseline creatinine- 1.4-1.6     History iron deficiency anemia  Baseline HCT 30-33   Taking OTC supplements      Persistent asthma   Rx-rescue inhaler      Past Medical History:  Past Medical History:   Diagnosis Date    Anemia     Anxiety     Atrial fibrillation     admitted to [facility] for AF w RVR; no cardioversion needed; Dr Mendes EPS. Slowed down on its own and back to NSR; placed on eliquis.     Carotid artery plaque     bilateral    Diabetes mellitus, type 2     Essential hypertension     Hypertension     Hypothyroidism     Liver dysfunction     Methylenetetrahydrofolate reductase (MTHFR) gene mutation     Mixed hyperlipidemia     Obesity     ROBBY (obstructive sleep apnea)     Proteinuria     Valvular heart disease        Past Surgical History:  Past Surgical History:   Procedure Laterality Date    APPENDECTOMY  5710-8786    COLONOSCOPY N/A 2019    Procedure: COLONOSCOPY;  Surgeon: Jimenez Solomon Jr., MD;  Location: [facility] ENDO;  Service: Endoscopy;  Laterality: N/A;    CORONARY STENT PLACEMENT  2017    LAD by Dr Ward    ESOPHAGOGASTRODUODENOSCOPY N/A 2019    Procedure: EGD (ESOPHAGOGASTRODUODENOSCOPY);  Surgeon: Jimenez Solomon Jr., MD;  Location: [facility] ENDO;  Service: Endoscopy;  Laterality: N/A;       Family History:  Family History   Problem Relation Age of Onset    Cancer Mother     Heart disease Mother     Heart disease Father     Arthritis Father     Hypertension Father     Cancer Sister         breast cancer    Diabetes Sister     Asthma Brother     COPD Brother     Diabetes Brother     Stroke Brother     Cancer Sister         leukemia    Early death Sister          at 64 of leukemia    Colon cancer Neg Hx     Crohn's disease Neg Hx      Esophageal cancer Neg Hx     Ulcerative colitis Neg Hx     Stomach cancer Neg Hx        Social History:  Social History     Socioeconomic History    Marital status:      Spouse name: Gwen   Occupational History    Occupation:  HS.   Tobacco Use    Smoking status: Never    Smokeless tobacco: Never   Substance and Sexual Activity    Alcohol use: Yes     Alcohol/week: 16.0 standard drinks of alcohol     Types: 8 Glasses of wine, 8 Shots of liquor per week    Drug use: No     Social Determinants of Health     Financial Resource Strain: Low Risk  (9/14/2023)    Overall Financial Resource Strain (CARDIA)     Difficulty of Paying Living Expenses: Not very hard   Food Insecurity: No Food Insecurity (9/14/2023)    Hunger Vital Sign     Worried About Running Out of Food in the Last Year: Never true     Ran Out of Food in the Last Year: Never true   Transportation Needs: Unknown (9/14/2023)    PRAPARE - Transportation     Lack of Transportation (Medical): No   Physical Activity: Insufficiently Active (9/14/2023)    Exercise Vital Sign     Days of Exercise per Week: 3 days     Minutes of Exercise per Session: 10 min   Stress: No Stress Concern Present (9/14/2023)    Citizen of the [place] of Occupational Health - Occupational Stress Questionnaire     Feeling of Stress : Only a little   Social Connections: Unknown (9/14/2023)    Social Connection and Isolation Panel [NHANES]     Frequency of Communication with Friends and Family: More than three times a week     Frequency of Social Gatherings with Friends and Family: Twice a week     Active Member of Clubs or Organizations: No     Attends Club or Organization Meetings: Patient declined     Marital Status:    Housing Stability: Unknown (9/14/2023)    Housing Stability Vital Sign     Unable to Pay for Housing in the Last Year: No     Number of Places Lived in the Last Year: 1       Medications:  Current Outpatient Medications on File Prior to Visit    Medication Sig Dispense Refill    albuterol (PROVENTIL/VENTOLIN HFA) 90 mcg/actuation inhaler INHALE 2 PUFFS INTO THE LUNGS EVERY 6 HOURS AS NEEDED FOR WHEEZING 54 g 3    amLODIPine (NORVASC) 5 MG tablet TAKE 1 TABLET(5 MG) BY MOUTH EVERY DAY 90 tablet 2    atorvastatin (LIPITOR) 40 MG tablet Take 1 tablet (40 mg total) by mouth once daily. 90 tablet 1    blood sugar diagnostic Strp To check BG once daily before a meal, to use with insurance preferred meter 100 strip 3    ELIQUIS 5 mg Tab Take 1 tablet (5 mg total) by mouth 2 (two) times daily. 60 tablet 3    ezetimibe (ZETIA) 10 mg tablet TAKE ONE-HALF TABLET BY MOUTH DAILY 45 tablet 3    hydroCHLOROthiazide (HYDRODIURIL) 12.5 MG Tab Take 12.5 mg by mouth 2 (two) times daily.      lancets Misc To check BG once daily before a meal, to use with insurance preferred meter 100 each 3    levothyroxine (SYNTHROID) 88 MCG tablet TAKE 1 TABLET(88 MCG) BY MOUTH BEFORE BREAKFAST 90 tablet 3    metFORMIN (GLUCOPHAGE) 500 MG tablet Take 500 mg by mouth 2 (two) times daily.      metoprolol succinate (TOPROL-XL) 50 MG 24 hr tablet Take 50 mg by mouth once daily.      nitroGLYCERIN (NITROSTAT) 0.4 MG SL tablet DISSOLVE ONE T SUBLINGUALLY Q 5 MINUTES X 3 DOSES PRN FOR CHEST PAIN  0    valsartan (DIOVAN) 160 MG tablet Take 160 mg by mouth 2 (two) times daily.      albuterol-ipratropium (DUO-NEB) 2.5 mg-0.5 mg/3 mL nebulizer solution Take 3 mLs by nebulization every 6 (six) hours as needed for Wheezing. Rescue (Patient not taking: Reported on 3/5/2024) 120 each 3    vitamin renal formula, B-complex-vitamin c-folic acid, (NEPHROCAP) 1 mg Cap Take 1 capsule by mouth once daily. (Patient not taking: Reported on 3/5/2024) 90 capsule 3     No current facility-administered medications on file prior to visit.       Allergies:  Multaq [dronedarone]    Immunizations:  Immunization History   Administered Date(s) Administered    COVID-19, MRNA, LN-S, PF (Pfizer) (Purple Cap) 01/10/2021,  "01/31/2021, 08/19/2021    Influenza (FLUAD) - Quadrivalent - Adjuvanted - PF *Preferred* (65+) 09/24/2020    Influenza - High Dose - PF (65 years and older) 09/10/2018, 03/05/2020    Pneumococcal Conjugate - 13 Valent 09/10/2018    Pneumococcal Polysaccharide - 23 Valent 06/27/2023    Rsv, Bivalent, Rsvpref (Abrysvo) 12/06/2023    Tdap 08/24/2022    Zoster Recombinant 12/06/2023       Review of Systems:  Review of Systems   All other systems reviewed and are negative.      Objective:    Vitals:  Vitals:    03/05/24 1410   BP: (!) 162/64   Pulse: 67   SpO2: 97%   Weight: 95.7 kg (210 lb 13.9 oz)   Height: 5' 10" (1.778 m)   PainSc:   6   PainLoc: Shoulder       Physical Exam  Vitals reviewed.   Constitutional:       General: He is not in acute distress.  HENT:      Head: Normocephalic and atraumatic.   Eyes:      Pupils: Pupils are equal, round, and reactive to light.   Cardiovascular:      Rate and Rhythm: Normal rate and regular rhythm.      Heart sounds: No murmur heard.     No friction rub.   Pulmonary:      Effort: Pulmonary effort is normal.      Breath sounds: Normal breath sounds.   Abdominal:      General: Bowel sounds are normal. There is no distension.      Palpations: Abdomen is soft.      Tenderness: There is no abdominal tenderness.   Musculoskeletal:      Cervical back: Neck supple.   Skin:     General: Skin is warm and dry.      Findings: No rash.   Psychiatric:         Behavior: Behavior normal.             Reji Lemos MD  Family Medicine      "

## 2024-03-06 LAB — METHYLMALONATE SERPL-SCNC: 0.32 UMOL/L

## 2024-03-06 NOTE — TELEPHONE ENCOUNTER
I have confirmed with Mr. Rossi by Edward that he does not need prescription assistance at this time. Mr. Rossi stated that his family income is over 100,00, but also said that it will dramatically reduce when his wife retires soon.   I let Mr. Rossi know when he does need assistance to reach out to me.

## 2024-03-11 ENCOUNTER — PATIENT OUTREACH (OUTPATIENT)
Dept: ADMINISTRATIVE | Facility: HOSPITAL | Age: 79
End: 2024-03-11
Payer: MEDICARE

## 2024-03-11 NOTE — PROGRESS NOTES
Population Health Chart Review & Patient Outreach Details      Additional Pop Health Notes:               Updates Requested / Reviewed:      Updated Care Coordination Note         Health Maintenance Topics Overdue:      VBHM Score: 2     Eye Exam  Uncontrolled BP    Shingles/Zoster Vaccine                  Health Maintenance Topic(s) Outreach Outcomes & Actions Taken:    Eye Exam - Outreach Outcomes & Actions Taken  : External Records Requested & Care Team Updated if Applicable

## 2024-03-11 NOTE — LETTER
FAX      AUTHORIZATION FOR RELEASE OF   CONFIDENTIAL INFORMATION        Dear Dr. Ramirez,      We are seeing Jadiel Rossi, date of birth 1945, in the clinic at [facility]. Reji Lemos MD is the patient's PCP. Jadiel Rossi has an outstanding lab/procedure at the time we reviewed their chart. In order to help keep their health information updated, Jadiel Rossi has authorized us to request the following medical record(s):     ()  MAMMOGRAM (WITHIN 2 YRS)  ()  COLON/PATH W/RECALL (WITHIN 10 YRS)     ()  PAP SMEAR (WITHIN 3 YRS)      ()  OUTSIDE LAB RESULTS    ()  DEXA SCAN (WITHIN 3 YRS)      (x) DM EYE EXAM (WITHIN 48 MONTHS)          ()  FOOT EXAM (WITHIN 1yr.)          () OUTSIDE IMMUNIZATIONS    ()  OTHER                                   Please fax records to [facility] [phone].     If you have any questions, please contact Jae at [phone]                      Patient Name: Jadiel Rossi  : 1945  Patient Phone #: [phone]

## 2024-04-15 ENCOUNTER — PATIENT MESSAGE (OUTPATIENT)
Dept: FAMILY MEDICINE | Facility: CLINIC | Age: 79
End: 2024-04-15
Payer: MEDICARE

## 2024-04-29 ENCOUNTER — PATIENT OUTREACH (OUTPATIENT)
Dept: ADMINISTRATIVE | Facility: HOSPITAL | Age: 79
End: 2024-04-29
Payer: MEDICARE

## 2024-04-29 NOTE — LETTER
**SECOND REQUEST**      AUTHORIZATION FOR RELEASE OF   CONFIDENTIAL INFORMATION    Dear Dr Ramirez,    We are seeing Jadiel Rossi, date of birth 1945, in the clinic at [facility]. Reji Lemos MD is the patient's PCP. Jadiel Rossi has an outstanding lab/procedure at the time we reviewed his chart. In order to help keep his health information updated, he has authorized us to request the following medical record(s):        (  )  MAMMOGRAM                                      (  )  COLONOSCOPY      (  )  PAP SMEAR                                          (  )  OUTSIDE LAB RESULTS     (  )  DEXA SCAN                                          ( x )  EYE EXAM            (  )  FOOT EXAM                                          (  )  ENTIRE RECORD     (  )  OUTSIDE IMMUNIZATIONS                 (  )  _______________         Please fax records to [facility], Pray, Zachary C., MD, [phone]     If you have any questions, please contact Spencerlisa at [phone].                                          Jadiel Rossi  MRN: [id number]  : 1945  Age: 77 y.o.  Sex: male         Patient/Legal Guardian Signature  This signature was collected at 2023    Jadiel Rossi       _______________________________   Printed Name/Relationship to Patient      Consent for Examination and Treatment: I hereby authorize the providers and employees of [facility] ([facility]) to provide medical treatment/services which includes, but is not limited to, performing and administering tests and diagnostic procedures that are deemed necessary, including, but not limited to, imaging examinations, blood tests and other laboratory procedures as may be required by the hospital, clinic, or may be ordered by my physician(s) or persons working under the general and/or special instructions of my physician(s).      I understand and agree that this consent covers all authorized persons,  including but not limited to physicians, residents, nurse practitioners, physicians' assistants, specialists, consultants, student nurses, and independently contracted physicians, who are called upon by the physician in charge, to carry out the diagnostic procedures and medical or surgical treatment.     I hereby authorize [facility] to retain or dispose of any specimens or tissue, should there be such remaining from any test or procedure.     I hereby authorize and give consent for [facility] providers and employees to take photographs, images or videotapes of such diagnostic, surgical or treatment procedures of Patient as may be required by [facility] or as may be ordered by a physician. I further acknowledge and agree that [facility] may use cameras or other devices for patient monitoring.     I am aware that the practice of medicine is not an exact science, and I acknowledge that no guarantees have been made to me as to the outcome of any tests, procedures or treatment.     Authorization for Release of Information: I understand that my insurance company and/or their agents may need information necessary to make determinations about payment/reimbursement. I hereby provide authorization to release to all insurance companies, their successors, assignees, other parties with whom they may have contracted, or others acting on their behalf, that are involved with payment for any hospital and/or clinic charges incurred by the patient, any information that they request and deem necessary for payment/reimbursement, and/or quality review.  I further authorize the release of my health information to physicians or other health care practitioners on staff who are involved in my health care now and in the future, and to other health care providers, entities, or institutions for the purpose of my continued care and treatment, including referrals.     REGISTRATION AUTHORIZATION  Form No. 32529 (Rev. 7/13/2022)       Medicare Patient's  Certification and Authorization to Release Information and Payment Request:  I certify that the information given by me in applying for payment under Title XVIII of the Social Security Act is correct. I authorize any mccracken of medical or other information about me to release to the Social SecurityAdministration, or its intermediaries or carriers, any information needed for this or a related Medicare claim. I request that payment of authorized benefits be made on my behalf.     Assignment of Insurance Benefits:   I hereby authorize any and all insurance companies, health plans, defined   benefit plans, health insurers or any entity that is or may be responsible for payment of my medical expenses to pay all hospital and medical benefits now due, and to become due and payable to me under any hospital benefits, sick benefits, injury benefits or any other benefit for services rendered to me, including Major Medical Benefits, direct to [facility] and all independently contracted physicians. I assign any and all rights that I may have against any and all insurance companies, health plans, defined benefit plans, health insurers or any entity that is or may be responsible for payment of my medical expenses, including, but not limited to any right to appeal a denial of a claim, any right to bring any action, lawsuit, administrative proceeding, or other cause of action on my behalf. I specifically assign my right to pursue litigation against any and all insurance companies, health plans, defined benefit plans, health insurers or any entity that is or may be responsible for payment of my medical expenses based upon a refusal to pay charges.            E. Valuables: It is understood and agreed that [facility] is not liable for the damage to or loss of any money, jewelry,   documents, dentures, eye glasses, hearing aids, prosthetics, or other property of value.     F. Computer Equipment: I understand and agree that should I choose to  use computer equipment owned by [facility] or if I choose to access the Internet via [facility]s network, I do so at my own risk. [facility] is not responsible for any damage to my computer equipment or to any damages of any type that might arise from my loss of equipment or data.     G. Acceptance of Financial Responsibility:  I agree that in consideration of the services and   supplies that have been   or will be furnished to the patient, I am hereby obligated to pay all charges made for or on the account of the patient according to the standard rates (in effect at the time the services and supplies are delivered) established by [facility], including its Patient Financial Assistance Policy to the extent it is applicable. I understand that I am responsible for all charges, or portions thereof, not covered by insurance or other sources. Patient refunds will be distributed only after balances at all [facility] facilities are paid.     H. Communication Authorization:  I hereby authorize [facility] and its representatives, along with any billing service   or  who may work on their behalf, to contact me on   my cell phone and/or home phone using pre- recorded messages, artificial voice messages, automatic telephone dialing devices or other computer assisted technology, or by electronic      mail, text messaging, or by any other form of electronic communication. This includes, but is not limited to, appointment reminders, yearly physical exam reminders, preventive care reminders, patient campaigns, welcome calls, and calls about account balances on my account or any account on which I am listed as a guarantor. I understand I have the right to opt out of these communications at any time.      Relationship  Between  Facility and  Provider:      I understand that some, but not all, providers furnishing services to the patient are not employees or agents of [facility]. The patient is under the care and supervision of his/her  attending physician, and it is the responsibility of the facility and its nursing staff to carry out the instructions of such physicians. It is the responsibility of the patient's physician/designee to obtain the patient's informed consent, when required, for medical or surgical treatment, special diagnostic or therapeutic procedures, or hospital services rendered for the patient under the special instructions of the physician/designee.     REGISTRATION AUTHORIZATION  Form No. 66216 (Rev. 7/13/2022)      Notice of Privacy Practices: I acknowledge I have received a copy of [facility]'s Notice of Privacy Practices.     Facility  Directory: I have discussed with the organization my desire to be either included or excluded  in the facility directory in the event of my being an inpatient at an [facility] facility. I understand that if my choice is to opt-out of being identified in the facility directory that the facility will not provide any information about me such as my condition (e.g. fair, stable, etc.) or my location in the facility (e.g., room number, department).     Immunizations: [facility] shares immunization information with state sponsored health departments to help you and your doctor keep track of your immunization records. By signing, you consent to have this information shared with the health department in your state:                                [place] - LINKS ([place] Immunization Network for Kids Statewide)                                [place] - MIIX ([place] Immunization Information eXchange)                                [place] - ImmPRINT (Immunization Patient Registry with Integrated Technology)     TERM: This authorization is valid for this and subsequent care/treatment I receive at [facility] and will remain valid unless/until revoked in writing by me.     [facility]: As used in this document, [facility] means all [facility] owned and managed facilities, including, but not  limited to, all health centers, surgery centers, clinics, urgent care centers, and hospitals.         [facility] complies with applicable Federal civil rights laws and does not discriminate on the basis of race, color, national origin, age, disability, or sex.  ATENCIÓN: si habla jonnysweta, tiene a mcdaniels disposición servicios gratuitos de asistencia lingüística. Curt esqueda [phone].  CHÚ Ý: N?u b?n nói Ti?ng Vi?t, có các d?ch v? h? tr? ngôn ng? mi?n phí dành cho b?n. G?i s? [phone].        REGISTRATION AUTHORIZATION  Form No. 87204 (Rev. 2022)     Patient Name: Jadiel Rossi  : 1945  Patient Phone #: [phone]

## 2024-05-07 ENCOUNTER — PATIENT OUTREACH (OUTPATIENT)
Dept: ADMINISTRATIVE | Facility: HOSPITAL | Age: 79
End: 2024-05-07
Payer: MEDICARE

## 2024-05-07 NOTE — LETTER
**THIRD REQUEST**            FAX      AUTHORIZATION FOR RELEASE OF   CONFIDENTIAL INFORMATION      Dear Dr Ramirez,      We are seeing Jadiel Rossi, date of birth 1945, in the clinic at [facility]. Reji Lemos MD is the patient's PCP. Jadiel Rossi has an outstanding lab/procedure at the time we reviewed their chart. In order to help keep their health information updated, Jadiel Rossi has authorized us to request the following medical records:        EYE EXAM - WITH RETINAL SCREENING (MOST RECENT WITHIN 48 MONTHS)        Please fax records to Reji Lemos MD at [facility] [phone].    If you have any questions, please contact Jae at [phone]    Thank you,    Jae Rodriguez LPN Clinical Care Coordinator  [facility]                                   Jadiel Rossi  MRN: [id number]  : 1945  Age: 77 y.o.  Sex: male         Patient/Legal Guardian Signature  This signature was collected at 2023    Jadiel Rossi       _______________________________   Printed Name/Relationship to Patient      Consent for Examination and Treatment: I hereby authorize the providers and employees of [facility] ([facility]) to provide medical treatment/services which includes, but is not limited to, performing and administering tests and diagnostic procedures that are deemed necessary, including, but not limited to, imaging examinations, blood tests and other laboratory procedures as may be required by the hospital, clinic, or may be ordered by my physician(s) or persons working under the general and/or special instructions of my physician(s).      I understand and agree that this consent covers all authorized persons, including but not limited to physicians, residents, nurse practitioners, physicians' assistants, specialists, consultants, student nurses, and independently contracted physicians, who are called upon by the physician in  charge, to carry out the diagnostic procedures and medical or surgical treatment.     I hereby authorize [facility] to retain or dispose of any specimens or tissue, should there be such remaining from any test or procedure.     I hereby authorize and give consent for [facility] providers and employees to take photographs, images or videotapes of such diagnostic, surgical or treatment procedures of Patient as may be required by [facility] or as may be ordered by a physician. I further acknowledge and agree that [facility] may use cameras or other devices for patient monitoring.     I am aware that the practice of medicine is not an exact science, and I acknowledge that no guarantees have been made to me as to the outcome of any tests, procedures or treatment.     Authorization for Release of Information: I understand that my insurance company and/or their agents may need information necessary to make determinations about payment/reimbursement. I hereby provide authorization to release to all insurance companies, their successors, assignees, other parties with whom they may have contracted, or others acting on their behalf, that are involved with payment for any hospital and/or clinic charges incurred by the patient, any information that they request and deem necessary for payment/reimbursement, and/or quality review.  I further authorize the release of my health information to physicians or other health care practitioners on staff who are involved in my health care now and in the future, and to other health care providers, entities, or institutions for the purpose of my continued care and treatment, including referrals.     REGISTRATION AUTHORIZATION  Form No. 38959 (Rev. 7/13/2022)       Medicare Patient's Certification and Authorization to Release Information and Payment Request:  I certify that the information given by me in applying for payment under Title XVIII of the Social Security Act is correct. I authorize any mccracken  of medical or other information about me to release to the [facility], or its intermediaries or carriers, any information needed for this or a related Medicare claim. I request that payment of authorized benefits be made on my behalf.     Assignment of Insurance Benefits:   I hereby authorize any and all insurance companies, health plans, defined   benefit plans, health insurers or any entity that is or may be responsible for payment of my medical expenses to pay all hospital and medical benefits now due, and to become due and payable to me under any hospital benefits, sick benefits, injury benefits or any other benefit for services rendered to me, including Major Medical Benefits, direct to [facility] and all independently contracted physicians. I assign any and all rights that I may have against any and all insurance companies, health plans, defined benefit plans, health insurers or any entity that is or may be responsible for payment of my medical expenses, including, but not limited to any right to appeal a denial of a claim, any right to bring any action, lawsuit, administrative proceeding, or other cause of action on my behalf. I specifically assign my right to pursue litigation against any and all insurance companies, health plans, defined benefit plans, health insurers or any entity that is or may be responsible for payment of my medical expenses based upon a refusal to pay charges.            E. Valuables: It is understood and agreed that [facility] is not liable for the damage to or loss of any money, jewelry,   documents, dentures, eye glasses, hearing aids, prosthetics, or other property of value.     F. Computer Equipment: I understand and agree that should I choose to use computer equipment owned by [facility] or if I choose to access the Internet via [facility]s network, I do so at my own risk. [facility] is not responsible for any damage to my computer equipment or to any damages of any type  that might arise from my loss of equipment or data.     G. Acceptance of Financial Responsibility:  I agree that in consideration of the services and   supplies that have been   or will be furnished to the patient, I am hereby obligated to pay all charges made for or on the account of the patient according to the standard rates (in effect at the time the services and supplies are delivered) established by [facility], including its Patient Financial Assistance Policy to the extent it is applicable. I understand that I am responsible for all charges, or portions thereof, not covered by insurance or other sources. Patient refunds will be distributed only after balances at all [facility] facilities are paid.     H. Communication Authorization:  I hereby authorize [facility] and its representatives, along with any billing service   or  who may work on their behalf, to contact me on   my cell phone and/or home phone using pre- recorded messages, artificial voice messages, automatic telephone dialing devices or other computer assisted technology, or by electronic      mail, text messaging, or by any other form of electronic communication. This includes, but is not limited to, appointment reminders, yearly physical exam reminders, preventive care reminders, patient campaigns, welcome calls, and calls about account balances on my account or any account on which I am listed as a guarantor. I understand I have the right to opt out of these communications at any time.      Relationship  Between  Facility and  Provider:      I understand that some, but not all, providers furnishing services to the patient are not employees or agents of [facility]. The patient is under the care and supervision of his/her attending physician, and it is the responsibility of the facility and its nursing staff to carry out the instructions of such physicians. It is the responsibility of the patient's physician/designee to obtain the patient's  informed consent, when required, for medical or surgical treatment, special diagnostic or therapeutic procedures, or hospital services rendered for the patient under the special instructions of the physician/designee.     REGISTRATION AUTHORIZATION  Form No. 74052 (Rev. 7/13/2022)      Notice of Privacy Practices: I acknowledge I have received a copy of [facility]'s Notice of Privacy Practices.     Facility  Directory: I have discussed with the organization my desire to be either included or excluded  in the facility directory in the event of my being an inpatient at an [facility] facility. I understand that if my choice is to opt-out of being identified in the facility directory that the facility will not provide any information about me such as my condition (e.g. fair, stable, etc.) or my location in the facility (e.g., room number, department).     Immunizations: [facility] shares immunization information with state sponsored health departments to help you and your doctor keep track of your immunization records. By signing, you consent to have this information shared with the health department in your state:                                [place] - LINKS ([place] Immunization Network for Kids Statewide)                                [place] - MIIX ([place] Immunization Information eXchange)                                [place] - ImmPRINT (Immunization Patient Registry with Integrated Technology)     TERM: This authorization is valid for this and subsequent care/treatment I receive at [facility] and will remain valid unless/until revoked in writing by me.     [facility]: As used in this document, [facility] means all [facility] owned and managed facilities, including, but not limited to, all health centers, surgery centers, clinics, urgent care centers, and hospitals.         [facility] complies with applicable Federal civil rights laws and does not discriminate on the basis of race,  color, national origin, age, disability, or sex.  ATENCIÓN: si habla español, tiene a mcdaniels disposición servicios gratuitos de asistencia lingüística. Curt esqueda [phone].  CHÚ Ý: N?u b?n nói Ti?ng Vi?t, có các d?ch v? h? tr? ngôn ng? mi?n phí dành cho b?n. G?i s? [phone].        REGISTRATION AUTHORIZATION  Form No. 84052 (Rev. 2022)     Patient Name: Jadiel Rossi  : 1945  Patient Phone #: [phone]

## 2024-05-24 ENCOUNTER — OFFICE VISIT (OUTPATIENT)
Dept: FAMILY MEDICINE | Facility: CLINIC | Age: 79
End: 2024-05-24
Payer: MEDICARE

## 2024-05-24 VITALS
WEIGHT: 209 LBS | SYSTOLIC BLOOD PRESSURE: 138 MMHG | BODY MASS INDEX: 29.92 KG/M2 | HEART RATE: 62 BPM | RESPIRATION RATE: 18 BRPM | OXYGEN SATURATION: 97 % | DIASTOLIC BLOOD PRESSURE: 74 MMHG | HEIGHT: 70 IN

## 2024-05-24 DIAGNOSIS — E72.12 METHYLENETETRAHYDROFOLATE REDUCTASE DEFICIENCY: Primary | ICD-10-CM

## 2024-05-24 DIAGNOSIS — D50.9 IRON DEFICIENCY ANEMIA, UNSPECIFIED IRON DEFICIENCY ANEMIA TYPE: ICD-10-CM

## 2024-05-24 DIAGNOSIS — I38 VALVULAR HEART DISEASE: ICD-10-CM

## 2024-05-24 DIAGNOSIS — N18.31 STAGE 3A CHRONIC KIDNEY DISEASE: ICD-10-CM

## 2024-05-24 DIAGNOSIS — G47.33 OSA (OBSTRUCTIVE SLEEP APNEA): ICD-10-CM

## 2024-05-24 DIAGNOSIS — E11.69 TYPE 2 DIABETES MELLITUS WITH OTHER SPECIFIED COMPLICATION, WITHOUT LONG-TERM CURRENT USE OF INSULIN: ICD-10-CM

## 2024-05-24 DIAGNOSIS — I65.29 CAROTID ATHEROSCLEROSIS, UNSPECIFIED LATERALITY: ICD-10-CM

## 2024-05-24 DIAGNOSIS — I10 ESSENTIAL HYPERTENSION: ICD-10-CM

## 2024-05-24 DIAGNOSIS — E03.9 HYPOTHYROIDISM, UNSPECIFIED TYPE: ICD-10-CM

## 2024-05-24 DIAGNOSIS — Z95.5 S/P CORONARY ARTERY STENT PLACEMENT: ICD-10-CM

## 2024-05-24 DIAGNOSIS — E78.2 MIXED HYPERLIPIDEMIA: ICD-10-CM

## 2024-05-24 DIAGNOSIS — I70.0 AORTIC ATHEROSCLEROSIS: ICD-10-CM

## 2024-05-24 DIAGNOSIS — I48.91 ATRIAL FIBRILLATION WITH RVR: ICD-10-CM

## 2024-05-24 PROCEDURE — 1101F PT FALLS ASSESS-DOCD LE1/YR: CPT | Mod: HCNC,CPTII,S$GLB, | Performed by: FAMILY MEDICINE

## 2024-05-24 PROCEDURE — 99999 PR PBB SHADOW E&M-EST. PATIENT-LVL IV: CPT | Mod: PBBFAC,HCNC,, | Performed by: FAMILY MEDICINE

## 2024-05-24 PROCEDURE — 3288F FALL RISK ASSESSMENT DOCD: CPT | Mod: HCNC,CPTII,S$GLB, | Performed by: FAMILY MEDICINE

## 2024-05-24 PROCEDURE — 1160F RVW MEDS BY RX/DR IN RCRD: CPT | Mod: HCNC,CPTII,S$GLB, | Performed by: FAMILY MEDICINE

## 2024-05-24 PROCEDURE — 3075F SYST BP GE 130 - 139MM HG: CPT | Mod: HCNC,CPTII,S$GLB, | Performed by: FAMILY MEDICINE

## 2024-05-24 PROCEDURE — 99214 OFFICE O/P EST MOD 30 MIN: CPT | Mod: HCNC,S$GLB,, | Performed by: FAMILY MEDICINE

## 2024-05-24 PROCEDURE — 3078F DIAST BP <80 MM HG: CPT | Mod: HCNC,CPTII,S$GLB, | Performed by: FAMILY MEDICINE

## 2024-05-24 PROCEDURE — G2211 COMPLEX E/M VISIT ADD ON: HCPCS | Mod: HCNC,S$GLB,, | Performed by: FAMILY MEDICINE

## 2024-05-24 PROCEDURE — 1159F MED LIST DOCD IN RCRD: CPT | Mod: HCNC,CPTII,S$GLB, | Performed by: FAMILY MEDICINE

## 2024-05-24 PROCEDURE — 1126F AMNT PAIN NOTED NONE PRSNT: CPT | Mod: HCNC,CPTII,S$GLB, | Performed by: FAMILY MEDICINE

## 2024-05-24 NOTE — PROGRESS NOTES
" THIS DOCUMENT WAS MADE IN PART WITH VOICE RECOGNITION SOFTWARE.  OCCASIONALLY THIS SOFTWARE WILL MISINTERPRET WORDS OR PHRASES.    Assessment and Plan:    1. Methylenetetrahydrofolate reductase deficiency        2. Aortic atherosclerosis        3. Atrial fibrillation with RVR        4. Carotid atherosclerosis, unspecified laterality        5. Essential hypertension        6. Mixed hyperlipidemia        7. S/P coronary artery stent placement        8. Valvular heart disease        9. Stage 3a chronic kidney disease        10. Iron deficiency anemia, unspecified iron deficiency anemia type        11. Type 2 diabetes mellitus with other specified complication, without long-term current use of insulin  Comprehensive Metabolic Panel    Hemoglobin A1C      12. ROBBY (obstructive sleep apnea)        13. Hypothyroidism, unspecified type  TSH          Nonfasting Wellness labs as above     Other chronic conditions stable     Tolerant Jardiance throughout negative side effect    ______________________________________________________________________  Subjective:    Chief Complaint:  Chief Complaint   Patient presents with    Follow-up        HPI:  Jadiel is a 78 y.o. year old     Follow-up diabetes   New medication Jardiance prescribed at prior visit, Farxiga was not covered   We also ordered pharmacy assistance program to assist with the cost of the drug   Tolerating medicine without side effect   Not too cost prohibitive     Follow-up other chronic conditions, see bolded text below   Initial blood pressure reading high   Reports getting standard 130s to 140s systolic at home with his current blood pressure cuff, that blood pressure cuff broke, needs new 1      History atrial fibrillation   Rx-Eliquis, Toprol XL 50   Cardiology- MD Ed   EP - MD Hector   "10% a fib" per Dr Tracy     Type 2 diabetes mellitus   Rx :  Jardiance  Prev Rx-metformin  Prev a1c : 5.9     Obstructive sleep apnea   CPAP intolerant   "Not candidate " "for Inspire" per SLENT      Dyslipidemia , bilateral carotid artery disease, CAD s/p PCI x2-3  Rx-atorvastatin 40 mg , Zetia  No exertional CP / SOB  Cardiology : MD Ed       Hypothyroidism   Rx-Synthroid 88 mcg   TSH nl      Essential hypertension   Rx- amlodipine 5 mg, Valsartan 160 mg BID, Metoprolol 50 mg , HCTZ\ 12.5         Anxiety   No current medication     Chronic kidney disease, proteinuria  Baseline creatinine- 1.4-1.6     History iron deficiency anemia  Baseline HCT 30-33   Taking OTC supplements      Persistent asthma   Rx-rescue inhaler      Past Medical History:  Past Medical History:   Diagnosis Date    Anemia     Anxiety     Atrial fibrillation     admitted to [facility] 7/1/18 for AF w RVR; no cardioversion needed; Dr Mendes EPS. Slowed down on its own and back to NSR; placed on eliquis.     Carotid artery plaque     bilateral    Diabetes mellitus, type 2     Essential hypertension     Hypertension     Hypothyroidism     Liver dysfunction     Methylenetetrahydrofolate reductase (MTHFR) gene mutation     Mixed hyperlipidemia     Obesity     ROBBY (obstructive sleep apnea)     Proteinuria     Valvular heart disease        Past Surgical History:  Past Surgical History:   Procedure Laterality Date    APPENDECTOMY  3032-9161    COLONOSCOPY N/A 5/1/2019    Procedure: COLONOSCOPY;  Surgeon: Jimenez Solomon Jr., MD;  Location: [facility] ENDO;  Service: Endoscopy;  Laterality: N/A;    CORONARY STENT PLACEMENT  04/25/2017    LAD by Dr Ward    ESOPHAGOGASTRODUODENOSCOPY N/A 5/1/2019    Procedure: EGD (ESOPHAGOGASTRODUODENOSCOPY);  Surgeon: Jimenez Solomon Jr., MD;  Location: [facility] ENDO;  Service: Endoscopy;  Laterality: N/A;       Family History:  Family History   Problem Relation Name Age of Onset    Cancer Mother      Heart disease Mother      Heart disease Father      Arthritis Father      Hypertension Father      Cancer Sister          breast cancer    Diabetes Sister      Asthma Brother      COPD Brother      " Diabetes Brother      Stroke Brother      Cancer Sister          leukemia    Early death Sister           at 64 of leukemia    Colon cancer Neg Hx      Crohn's disease Neg Hx      Esophageal cancer Neg Hx      Ulcerative colitis Neg Hx      Stomach cancer Neg Hx         Social History:  Social History     Socioeconomic History    Marital status:      Spouse name: Gwen   Occupational History    Occupation:  HS.   Tobacco Use    Smoking status: Never    Smokeless tobacco: Never   Substance and Sexual Activity    Alcohol use: Yes     Alcohol/week: 16.0 standard drinks of alcohol     Types: 8 Glasses of wine, 8 Shots of liquor per week    Drug use: No     Social Determinants of Health     Financial Resource Strain: Low Risk  (2023)    Overall Financial Resource Strain (CARDIA)     Difficulty of Paying Living Expenses: Not very hard   Food Insecurity: No Food Insecurity (2023)    Hunger Vital Sign     Worried About Running Out of Food in the Last Year: Never true     Ran Out of Food in the Last Year: Never true   Transportation Needs: Unknown (2023)    PRAPARE - Transportation     Lack of Transportation (Medical): No   Physical Activity: Insufficiently Active (2023)    Exercise Vital Sign     Days of Exercise per Week: 3 days     Minutes of Exercise per Session: 10 min   Stress: No Stress Concern Present (2023)    [employer] - Occupational Stress Questionnaire     Feeling of Stress : Only a little   Housing Stability: Unknown (2023)    Housing Stability Vital Sign     Unable to Pay for Housing in the Last Year: No     Number of Places Lived in the Last Year: 1       Medications:  Current Outpatient Medications on File Prior to Visit   Medication Sig Dispense Refill    albuterol (PROVENTIL/VENTOLIN HFA) 90 mcg/actuation inhaler INHALE 2 PUFFS INTO THE LUNGS EVERY 6 HOURS AS NEEDED FOR WHEEZING 54 g 3    amLODIPine (NORVASC) 5 MG  tablet TAKE 1 TABLET(5 MG) BY MOUTH EVERY DAY 90 tablet 2    atorvastatin (LIPITOR) 40 MG tablet Take 1 tablet (40 mg total) by mouth once daily. 90 tablet 1    blood sugar diagnostic Strp To check BG once daily before a meal, to use with insurance preferred meter 100 strip 3    ELIQUIS 5 mg Tab Take 1 tablet (5 mg total) by mouth 2 (two) times daily. 60 tablet 3    empagliflozin (JARDIANCE) 10 mg tablet Take 1 tablet (10 mg total) by mouth once daily. 30 tablet 5    ezetimibe (ZETIA) 10 mg tablet TAKE ONE-HALF TABLET BY MOUTH DAILY 45 tablet 3    hydroCHLOROthiazide (HYDRODIURIL) 12.5 MG Tab Take 12.5 mg by mouth 2 (two) times daily.      lancets Misc To check BG once daily before a meal, to use with insurance preferred meter 100 each 3    levothyroxine (SYNTHROID) 88 MCG tablet TAKE 1 TABLET(88 MCG) BY MOUTH BEFORE BREAKFAST 90 tablet 3    metoprolol succinate (TOPROL-XL) 50 MG 24 hr tablet Take 50 mg by mouth once daily.      valsartan (DIOVAN) 160 MG tablet Take 160 mg by mouth 2 (two) times daily.      albuterol-ipratropium (DUO-NEB) 2.5 mg-0.5 mg/3 mL nebulizer solution Take 3 mLs by nebulization every 6 (six) hours as needed for Wheezing. Rescue (Patient not taking: Reported on 3/5/2024) 120 each 3    metFORMIN (GLUCOPHAGE) 500 MG tablet Take 500 mg by mouth 2 (two) times daily. (Patient not taking: Reported on 5/24/2024)      nitroGLYCERIN (NITROSTAT) 0.4 MG SL tablet DISSOLVE ONE T SUBLINGUALLY Q 5 MINUTES X 3 DOSES PRN FOR CHEST PAIN (Patient not taking: Reported on 5/24/2024)  0    vitamin renal formula, B-complex-vitamin c-folic acid, (NEPHROCAP) 1 mg Cap Take 1 capsule by mouth once daily. (Patient not taking: Reported on 5/24/2024) 90 capsule 3     No current facility-administered medications on file prior to visit.       Allergies:  Multaq [dronedarone]    Immunizations:  Immunization History   Administered Date(s) Administered    COVID-19, MRNA, LN-S, PF (Pfizer) (Purple Cap) 01/10/2021, 01/31/2021,  "08/19/2021    Influenza (FLUAD) - Quadrivalent - Adjuvanted - PF *Preferred* (65+) 09/24/2020    Influenza - High Dose - PF (65 years and older) 09/10/2018, 03/05/2020    Pneumococcal Conjugate - 13 Valent 09/10/2018    Pneumococcal Polysaccharide - 23 Valent 06/27/2023    Rsv, Bivalent, Rsvpref (Abrysvo) 12/06/2023    Tdap 08/24/2022    Zoster Recombinant 12/06/2023       Review of Systems:  Review of Systems   All other systems reviewed and are negative.      Objective:    Vitals:  Vitals:    05/24/24 1501 05/24/24 1512   BP: (!) 152/70 138/74   Pulse: 62    Resp: 18    SpO2: 97%    Weight: 94.8 kg (208 lb 15.9 oz)    Height: 5' 10" (1.778 m)    PainSc: 0-No pain        Physical Exam  Vitals reviewed.   Constitutional:       General: He is not in acute distress.  HENT:      Head: Normocephalic and atraumatic.   Eyes:      Pupils: Pupils are equal, round, and reactive to light.   Cardiovascular:      Rate and Rhythm: Normal rate and regular rhythm.      Heart sounds: No murmur heard.     No friction rub.   Pulmonary:      Effort: Pulmonary effort is normal.      Breath sounds: Normal breath sounds.   Abdominal:      General: Bowel sounds are normal. There is no distension.      Palpations: Abdomen is soft.      Tenderness: There is no abdominal tenderness.   Musculoskeletal:      Cervical back: Neck supple.   Skin:     General: Skin is warm and dry.      Findings: No rash.   Psychiatric:         Behavior: Behavior normal.           Reji Lemos MD  Family Medicine      "

## 2024-05-27 ENCOUNTER — PATIENT OUTREACH (OUTPATIENT)
Dept: ADMINISTRATIVE | Facility: HOSPITAL | Age: 79
End: 2024-05-27
Payer: MEDICARE

## 2024-05-27 NOTE — LETTER
AUTHORIZATION FOR RELEASE OF   CONFIDENTIAL INFORMATION    Dear Humphrey Ramirez MD,    We are seeing Jadiel Rossi, date of birth 1945, in the clinic at [facility]. Reji Lemos MD is the patient's PCP. Jadiel Rossi has an outstanding lab/procedure at the time we reviewed his chart. In order to help keep his health information updated, he has authorized us to request the following medical record(s):        (  )  MAMMOGRAM                                      (  )  COLONOSCOPY      (  )  PAP SMEAR                                          (  )  OUTSIDE LAB RESULTS     (  )  DEXA SCAN                                          (x)  EYE EXAM            (  )  FOOT EXAM                                          (  )  ENTIRE RECORD     (  )  OUTSIDE IMMUNIZATIONS                 (  )  _______________         Please fax records to [facility], Pray, Zachary C., MD, [phone].    If you have any questions, please contact Neeraj Urena LPN Care Coordinator  at [phone].            Patient Name: Jadiel Rossi  : 1945  Patient Phone #: [phone]                         Jadiel Rossi  MRN: [id number]  : 1945  Age: 77 y.o.  Sex: male         Patient/Legal Guardian Signature  This signature was collected at 2023    Jadiel Rossi       _______________________________   Printed Name/Relationship to Patient      Consent for Examination and Treatment: I hereby authorize the providers and employees of [facility] ([facility]) to provide medical treatment/services which includes, but is not limited to, performing and administering tests and diagnostic procedures that are deemed necessary, including, but not limited to, imaging examinations, blood tests and other laboratory procedures as may be required by the hospital, clinic, or may be ordered by my physician(s) or persons working under the general and/or special instructions of my physician(s).       I understand and agree that this consent covers all authorized persons, including but not limited to physicians, residents, nurse practitioners, physicians' assistants, specialists, consultants, student nurses, and independently contracted physicians, who are called upon by the physician in charge, to carry out the diagnostic procedures and medical or surgical treatment.     I hereby authorize [facility] to retain or dispose of any specimens or tissue, should there be such remaining from any test or procedure.     I hereby authorize and give consent for [facility] providers and employees to take photographs, images or videotapes of such diagnostic, surgical or treatment procedures of Patient as may be required by [facility] or as may be ordered by a physician. I further acknowledge and agree that [facility] may use cameras or other devices for patient monitoring.     I am aware that the practice of medicine is not an exact science, and I acknowledge that no guarantees have been made to me as to the outcome of any tests, procedures or treatment.     Authorization for Release of Information: I understand that my insurance company and/or their agents may need information necessary to make determinations about payment/reimbursement. I hereby provide authorization to release to all insurance companies, their successors, assignees, other parties with whom they may have contracted, or others acting on their behalf, that are involved with payment for any hospital and/or clinic charges incurred by the patient, any information that they request and deem necessary for payment/reimbursement, and/or quality review.  I further authorize the release of my health information to physicians or other health care practitioners on staff who are involved in my health care now and in the future, and to other health care providers, entities, or institutions for the purpose of my continued care and treatment, including referrals.     REGISTRATION  AUTHORIZATION  Form No. 04125 (Rev. 7/13/2022)       Medicare Patient's Certification and Authorization to Release Information and Payment Request:  I certify that the information given by me in applying for payment under Title XVIII of the Social Security Act is correct. I authorize any mccracken of medical or other information about me to release to the [facility], or its intermediaries or carriers, any information needed for this or a related Medicare claim. I request that payment of authorized benefits be made on my behalf.     Assignment of Insurance Benefits:   I hereby authorize any and all insurance companies, health plans, defined   benefit plans, health insurers or any entity that is or may be responsible for payment of my medical expenses to pay all hospital and medical benefits now due, and to become due and payable to me under any hospital benefits, sick benefits, injury benefits or any other benefit for services rendered to me, including Major Medical Benefits, direct to [facility] and all independently contracted physicians. I assign any and all rights that I may have against any and all insurance companies, health plans, defined benefit plans, health insurers or any entity that is or may be responsible for payment of my medical expenses, including, but not limited to any right to appeal a denial of a claim, any right to bring any action, lawsuit, administrative proceeding, or other cause of action on my behalf. I specifically assign my right to pursue litigation against any and all insurance companies, health plans, defined benefit plans, health insurers or any entity that is or may be responsible for payment of my medical expenses based upon a refusal to pay charges.            E. Valuables: It is understood and agreed that [facility] is not liable for the damage to or loss of any money, jewelry,   documents, dentures, eye glasses, hearing aids, prosthetics, or other property of value.      F. Computer Equipment: I understand and agree that should I choose to use computer equipment owned by [facility] or if I choose to access the Internet via [facility]s network, I do so at my own risk. [facility] is not responsible for any damage to my computer equipment or to any damages of any type that might arise from my loss of equipment or data.     G. Acceptance of Financial Responsibility:  I agree that in consideration of the services and   supplies that have been   or will be furnished to the patient, I am hereby obligated to pay all charges made for or on the account of the patient according to the standard rates (in effect at the time the services and supplies are delivered) established by [facility], including its Patient Financial Assistance Policy to the extent it is applicable. I understand that I am responsible for all charges, or portions thereof, not covered by insurance or other sources. Patient refunds will be distributed only after balances at all [facility] facilities are paid.     H. Communication Authorization:  I hereby authorize [facility] and its representatives, along with any billing service   or  who may work on their behalf, to contact me on   my cell phone and/or home phone using pre- recorded messages, artificial voice messages, automatic telephone dialing devices or other computer assisted technology, or by electronic      mail, text messaging, or by any other form of electronic communication. This includes, but is not limited to, appointment reminders, yearly physical exam reminders, preventive care reminders, patient campaigns, welcome calls, and calls about account balances on my account or any account on which I am listed as a guarantor. I understand I have the right to opt out of these communications at any time.      Relationship  Between  Facility and  Provider:      I understand that some, but not all, providers furnishing services to the patient are not employees or agents  of [facility]. The patient is under the care and supervision of his/her attending physician, and it is the responsibility of the facility and its nursing staff to carry out the instructions of such physicians. It is the responsibility of the patient's physician/designee to obtain the patient's informed consent, when required, for medical or surgical treatment, special diagnostic or therapeutic procedures, or hospital services rendered for the patient under the special instructions of the physician/designee.     REGISTRATION AUTHORIZATION  Form No. 29045 (Rev. 7/13/2022)      Notice of Privacy Practices: I acknowledge I have received a copy of [facility]'s Notice of Privacy Practices.     Facility  Directory: I have discussed with the organization my desire to be either included or excluded  in the facility directory in the event of my being an inpatient at an [facility] facility. I understand that if my choice is to opt-out of being identified in the facility directory that the facility will not provide any information about me such as my condition (e.g. fair, stable, etc.) or my location in the facility (e.g., room number, department).     Immunizations: [facility] shares immunization information with state sponsored health departments to help you and your doctor keep track of your immunization records. By signing, you consent to have this information shared with the health department in your state:                                [place] - LINKS ([place] Immunization Network for Kids Statewide)                                [place] - MIIX ([place] Immunization Information eXchange)                                [place] - ImmPRINT (Immunization Patient Registry with Integrated Technology)     TERM: This authorization is valid for this and subsequent care/treatment I receive at [facility] and will remain valid unless/until revoked in writing by me.     [facility]: As used in this document, [facility]  means all [facility] owned and managed facilities, including, but not limited to, all health centers, surgery centers, clinics, urgent care centers, and hospitals.         [facility] complies with applicable Federal civil rights laws and does not discriminate on the basis of race, color, national origin, age, disability, or sex.  ATENCIÓN: si habla jonnyañol, tiene a mcdaniels disposición servicios gratuitos de asistencia lingüística. Curt al [phone].  CHÚ Ý: N?u b?n nói Ti?ng Vi?t, có các d?ch v? h? tr? ngôn ng? mi?n phí dành cho b?n. G?i s? [phone].        REGISTRATION AUTHORIZATION  Form No. 33756 (Rev. 7/13/2022)

## 2024-05-27 NOTE — PROGRESS NOTES
Population Health Chart Review & Patient Outreach Details      Additional [facility] Health Notes:               Updates Requested / Reviewed:      Updated Care Coordination Note, , and Immunizations Reconciliation Completed or Queried: [place]         Health Maintenance Topics Overdue:      [facility] Score: 1     Eye Exam                       Health Maintenance Topic(s) Outreach Outcomes & Actions Taken:    Eye Exam - Outreach Outcomes & Actions Taken  : External Records Requested & Care Team Updated if Applicable

## 2024-05-30 DIAGNOSIS — Z95.5 S/P CORONARY ARTERY STENT PLACEMENT: ICD-10-CM

## 2024-05-30 DIAGNOSIS — E78.2 MIXED HYPERLIPIDEMIA: ICD-10-CM

## 2024-05-30 RX ORDER — ATORVASTATIN CALCIUM 40 MG/1
40 TABLET, FILM COATED ORAL
Qty: 90 TABLET | Refills: 1 | Status: SHIPPED | OUTPATIENT
Start: 2024-05-30

## 2024-05-30 NOTE — TELEPHONE ENCOUNTER
Refill Decision Note   Jadiel Rossi  is requesting a refill authorization.  Brief Assessment and Rationale for Refill:  Approve     Medication Therapy Plan: FLOS      Comments:     Note composed:10:47 AM 05/30/2024            
Care Due:                  Date            Visit Type   Department     Provider  --------------------------------------------------------------------------------                                MYCHART                              FOLLOWUP/OF  [facility]  Last Visit: 05-      FICE VISIT   MEDICINE       Reji Lemos                              EP -                              PRIMARY      [facility]  Next Visit: 09-      CARE ([facility])   MEDICINE       Reji Lemos                                                            Last  Test          Frequency    Reason                     Performed    Due Date  --------------------------------------------------------------------------------    HBA1C.......  6 months...  empagliflozin............  03- 08-    [employer] Embedded Care Due Messages. Reference number: [id number].   5/30/2024 8:04:36 AM CDT  
weakness

## 2024-06-12 ENCOUNTER — PATIENT MESSAGE (OUTPATIENT)
Dept: FAMILY MEDICINE | Facility: CLINIC | Age: 79
End: 2024-06-12
Payer: MEDICARE

## 2024-06-12 ENCOUNTER — PATIENT OUTREACH (OUTPATIENT)
Dept: ADMINISTRATIVE | Facility: HOSPITAL | Age: 79
End: 2024-06-12
Payer: MEDICARE

## 2024-06-12 DIAGNOSIS — I48.91 ATRIAL FIBRILLATION WITH RVR: ICD-10-CM

## 2024-06-12 DIAGNOSIS — E11.69 TYPE 2 DIABETES MELLITUS WITH OTHER SPECIFIED COMPLICATION, WITHOUT LONG-TERM CURRENT USE OF INSULIN: Primary | ICD-10-CM

## 2024-06-12 NOTE — PROGRESS NOTES
Population Health Chart Review & Patient Outreach Details      Additional [facility] Health Notes:               Updates Requested / Reviewed:      Updated Care Coordination Note, , and Immunizations Reconciliation Completed or Queried: [place]         Health Maintenance Topics Overdue:      [facility] Score: 0     Patient is not due for any topics at this time.                       Health Maintenance Topic(s) Outreach Outcomes & Actions Taken:    Eye Exam - Outreach Outcomes & Actions Taken  : Diabetic Eye External Records Uploaded, Care Team & History Updated if Applicable

## 2024-06-13 ENCOUNTER — TELEPHONE (OUTPATIENT)
Dept: PHARMACY | Facility: CLINIC | Age: 79
End: 2024-06-13
Payer: MEDICARE

## 2024-06-13 NOTE — TELEPHONE ENCOUNTER
Duplicate Referral:  last spoke with patient 03/05/24(see telephone encounter from Pharmacy Patient Assistance). He stated he did not want to apply for assistance because he knew he was over income limit. Portal message sent to remind patient to reach back out to PAP team if circumstances have changed and would like to apply.

## 2024-07-01 ENCOUNTER — HOSPITAL ENCOUNTER (OUTPATIENT)
Dept: RADIOLOGY | Facility: HOSPITAL | Age: 79
Discharge: HOME | End: 2024-07-01
Attending: SPECIALIST
Payer: MEDICARE

## 2024-07-01 DIAGNOSIS — G47.30 SLEEP APNEA SYNDROME: ICD-10-CM

## 2024-07-01 PROCEDURE — 71046 X-RAY EXAM CHEST 2 VIEWS: CPT | Mod: TC,HCNC,PN

## 2024-07-01 PROCEDURE — 71046 X-RAY EXAM CHEST 2 VIEWS: CPT | Mod: 26,HCNC,, | Performed by: RADIOLOGY

## 2024-07-21 RX ORDER — ALBUTEROL SULFATE 90 UG/1
AEROSOL, METERED RESPIRATORY (INHALATION)
Qty: 54 G | Refills: 3 | Status: SHIPPED | OUTPATIENT
Start: 2024-07-21

## 2024-07-21 NOTE — TELEPHONE ENCOUNTER
Care Due:                  Date            Visit Type   Department     Provider  --------------------------------------------------------------------------------                                MYCHART                              FOLLOWUP/OF  [facility]  Last Visit: 05-      FICE VISIT   MEDICINE       Reji Lemos                              EP -                              PRIMARY      [facility]  Next Visit: 09-      CARE ([facility])   MEDICINE       Reji Lemos                                                            Last  Test          Frequency    Reason                     Performed    Due Date  --------------------------------------------------------------------------------    Lipid Panel.  12 months..  atorvastatin.............  10-   10-    [employer] Embedded Care Due Messages. Reference number: [id number].   7/21/2024 3:28:32 AM CDT

## 2024-07-21 NOTE — TELEPHONE ENCOUNTER
Provider Staff:  Action required for this patient     Please see care gap opportunities below in Care Due Message.    Thanks!  [facility]     Appointments      Date Provider   Last Visit   5/24/2024 Reji Lemos MD   Next Visit   9/6/2024 Reji Lemos MD      Refill Decision Note   Jadiel Rossi  is requesting a refill authorization.  Brief Assessment and Rationale for Refill:  Approve     Medication Therapy Plan:         Comments:     Note composed:3:29 AM 07/21/2024

## 2024-07-28 DIAGNOSIS — I10 ESSENTIAL HYPERTENSION: ICD-10-CM

## 2024-07-28 NOTE — TELEPHONE ENCOUNTER
No care due was identified.  [facility] Embedded Care Due Messages. Reference number: [id number].   7/28/2024 8:06:13 AM CDT

## 2024-07-29 RX ORDER — AMLODIPINE BESYLATE 5 MG/1
TABLET ORAL
Qty: 90 TABLET | Refills: 3 | Status: SHIPPED | OUTPATIENT
Start: 2024-07-29

## 2024-07-29 NOTE — TELEPHONE ENCOUNTER
Jadiel Rossi  is requesting a refill authorization.  Brief Assessment and Rationale for Refill:  Approve     Medication Therapy Plan:         Comments:     Note composed:6:03 AM 07/29/2024

## 2024-08-29 ENCOUNTER — PATIENT OUTREACH (OUTPATIENT)
Dept: ADMINISTRATIVE | Facility: HOSPITAL | Age: 79
End: 2024-08-29
Payer: MEDICARE

## 2024-08-29 DIAGNOSIS — E11.9 DIABETES MELLITUS WITHOUT COMPLICATION: Primary | ICD-10-CM

## 2024-08-29 NOTE — PROGRESS NOTES
Population Health Chart Review & Patient Outreach Details      Additional [facility] Health Notes:               Updates Requested / Reviewed:      Updated Care Coordination Note and Immunizations Reconciliation Completed or Queried: [place]         Health Maintenance Topics Overdue:      [facility] Score: 0     Patient is not due for any topics at this time.                       Health Maintenance Topic(s) Outreach Outcomes & Actions Taken:    Lab(s) - Outreach Outcomes & Actions Taken  : Overdue Lab(s) Ordered and Overdue Lab(s) Scheduled

## 2024-09-07 ENCOUNTER — PATIENT MESSAGE (OUTPATIENT)
Dept: FAMILY MEDICINE | Facility: CLINIC | Age: 79
End: 2024-09-07
Payer: MEDICARE

## 2024-09-07 DIAGNOSIS — I48.0 PAROXYSMAL ATRIAL FIBRILLATION: ICD-10-CM

## 2024-09-09 RX ORDER — VALSARTAN 160 MG/1
160 TABLET ORAL 2 TIMES DAILY
Qty: 180 TABLET | Refills: 2 | Status: SHIPPED | OUTPATIENT
Start: 2024-09-09

## 2024-09-09 NOTE — TELEPHONE ENCOUNTER
Refill Routing Note   Medication(s) are not appropriate for processing by [facility] for the following reason(s):        Required vitals abnormal: BP (!) 165/67     [facility] action(s):  Defer     Requires labs : Yes    Medication Therapy Plan:  Lab (a1c) outdated      Appointments  past 12m or future 3m with PCP    Date Provider   Last Visit   5/24/2024 Reji Lemos MD   Next Visit   Visit date not found Reji Lemos MD   ED visits in past 90 days: 1        Note composed:10:12 AM 09/09/2024

## 2024-09-09 NOTE — TELEPHONE ENCOUNTER
Care Due:                  Date            Visit Type   Department     Provider  --------------------------------------------------------------------------------                                MYCHART                              FOLLOWUP/OF  [facility]  Last Visit: 05-      FICE VISIT   MEDICINE       Reji Lemos  Next Visit: None Scheduled  None         None Found                                                            Last  Test          Frequency    Reason                     Performed    Due Date  --------------------------------------------------------------------------------    HBA1C.......  6 months...  empagliflozin............  03- 08-    [employer] Embedded Care Due Messages. Reference number: [id number].   9/09/2024 7:30:23 AM CDT

## 2024-09-24 ENCOUNTER — LAB VISIT (OUTPATIENT)
Dept: LAB | Facility: HOSPITAL | Age: 79
End: 2024-09-24
Attending: FAMILY MEDICINE
Payer: MEDICARE

## 2024-09-24 DIAGNOSIS — E11.69 TYPE 2 DIABETES MELLITUS WITH OTHER SPECIFIED COMPLICATION, WITHOUT LONG-TERM CURRENT USE OF INSULIN: ICD-10-CM

## 2024-09-24 DIAGNOSIS — E03.9 HYPOTHYROIDISM, UNSPECIFIED TYPE: ICD-10-CM

## 2024-09-24 LAB
ALBUMIN SERPL BCP-MCNC: 3.4 G/DL (ref 3.5–5.2)
ALP SERPL-CCNC: 67 U/L (ref 55–135)
ALT SERPL W/O P-5'-P-CCNC: 21 U/L (ref 10–44)
ANION GAP SERPL CALC-SCNC: 10 MMOL/L (ref 8–16)
AST SERPL-CCNC: 26 U/L (ref 10–40)
BILIRUB SERPL-MCNC: 0.9 MG/DL (ref 0.1–1)
BUN SERPL-MCNC: 34 MG/DL (ref 8–23)
CALCIUM SERPL-MCNC: 9.8 MG/DL (ref 8.7–10.5)
CHLORIDE SERPL-SCNC: 101 MMOL/L (ref 95–110)
CO2 SERPL-SCNC: 26 MMOL/L (ref 23–29)
CREAT SERPL-MCNC: 2 MG/DL (ref 0.5–1.4)
EST. GFR  (NO RACE VARIABLE): 33.7 ML/MIN/1.73 M^2
ESTIMATED AVG GLUCOSE: 143 MG/DL (ref 68–131)
GLUCOSE SERPL-MCNC: 107 MG/DL (ref 70–110)
HBA1C MFR BLD: 6.6 % (ref 4–5.6)
POTASSIUM SERPL-SCNC: 4.3 MMOL/L (ref 3.5–5.1)
PROT SERPL-MCNC: 6.9 G/DL (ref 6–8.4)
SODIUM SERPL-SCNC: 137 MMOL/L (ref 136–145)
TSH SERPL DL<=0.005 MIU/L-ACNC: 2.62 UIU/ML (ref 0.4–4)

## 2024-09-24 PROCEDURE — 80053 COMPREHEN METABOLIC PANEL: CPT | Mod: HCNC | Performed by: FAMILY MEDICINE

## 2024-09-24 PROCEDURE — 84443 ASSAY THYROID STIM HORMONE: CPT | Mod: HCNC | Performed by: FAMILY MEDICINE

## 2024-09-24 PROCEDURE — 36415 COLL VENOUS BLD VENIPUNCTURE: CPT | Mod: HCNC,PN | Performed by: FAMILY MEDICINE

## 2024-09-24 PROCEDURE — 83036 HEMOGLOBIN GLYCOSYLATED A1C: CPT | Mod: HCNC | Performed by: FAMILY MEDICINE

## 2024-09-25 ENCOUNTER — OFFICE VISIT (OUTPATIENT)
Dept: FAMILY MEDICINE | Facility: CLINIC | Age: 79
End: 2024-09-25
Payer: MEDICARE

## 2024-09-25 VITALS
HEIGHT: 70 IN | WEIGHT: 209.13 LBS | HEART RATE: 78 BPM | OXYGEN SATURATION: 95 % | SYSTOLIC BLOOD PRESSURE: 128 MMHG | BODY MASS INDEX: 29.94 KG/M2 | DIASTOLIC BLOOD PRESSURE: 66 MMHG

## 2024-09-25 DIAGNOSIS — N18.32 STAGE 3B CHRONIC KIDNEY DISEASE: ICD-10-CM

## 2024-09-25 DIAGNOSIS — I38 VALVULAR HEART DISEASE: ICD-10-CM

## 2024-09-25 DIAGNOSIS — E78.2 MIXED HYPERLIPIDEMIA: ICD-10-CM

## 2024-09-25 DIAGNOSIS — I10 ESSENTIAL HYPERTENSION: ICD-10-CM

## 2024-09-25 DIAGNOSIS — E11.69 TYPE 2 DIABETES MELLITUS WITH OTHER SPECIFIED COMPLICATION, WITHOUT LONG-TERM CURRENT USE OF INSULIN: Primary | ICD-10-CM

## 2024-09-25 DIAGNOSIS — I48.0 PAROXYSMAL ATRIAL FIBRILLATION: ICD-10-CM

## 2024-09-25 PROCEDURE — 99999 PR PBB SHADOW E&M-EST. PATIENT-LVL IV: CPT | Mod: PBBFAC,HCNC,,

## 2024-09-25 PROCEDURE — 1126F AMNT PAIN NOTED NONE PRSNT: CPT | Mod: HCNC,CPTII,S$GLB,

## 2024-09-25 PROCEDURE — 3288F FALL RISK ASSESSMENT DOCD: CPT | Mod: HCNC,CPTII,S$GLB,

## 2024-09-25 PROCEDURE — 1159F MED LIST DOCD IN RCRD: CPT | Mod: HCNC,CPTII,S$GLB,

## 2024-09-25 PROCEDURE — 2023F DILAT RTA XM W/O RTNOPTHY: CPT | Mod: HCNC,CPTII,S$GLB,

## 2024-09-25 PROCEDURE — 1101F PT FALLS ASSESS-DOCD LE1/YR: CPT | Mod: HCNC,CPTII,S$GLB,

## 2024-09-25 PROCEDURE — 3078F DIAST BP <80 MM HG: CPT | Mod: HCNC,CPTII,S$GLB,

## 2024-09-25 PROCEDURE — 99214 OFFICE O/P EST MOD 30 MIN: CPT | Mod: HCNC,S$GLB,,

## 2024-09-25 PROCEDURE — 3074F SYST BP LT 130 MM HG: CPT | Mod: HCNC,CPTII,S$GLB,

## 2024-10-15 ENCOUNTER — LAB VISIT (OUTPATIENT)
Dept: LAB | Facility: HOSPITAL | Age: 79
End: 2024-10-15
Attending: FAMILY MEDICINE
Payer: MEDICARE

## 2024-10-15 DIAGNOSIS — E78.2 MIXED HYPERLIPIDEMIA: ICD-10-CM

## 2024-10-15 DIAGNOSIS — N18.32 STAGE 3B CHRONIC KIDNEY DISEASE: ICD-10-CM

## 2024-10-15 LAB
ANION GAP SERPL CALC-SCNC: 13 MMOL/L (ref 8–16)
BUN SERPL-MCNC: 38 MG/DL (ref 8–23)
CALCIUM SERPL-MCNC: 9.8 MG/DL (ref 8.7–10.5)
CHLORIDE SERPL-SCNC: 103 MMOL/L (ref 95–110)
CHOLEST SERPL-MCNC: 106 MG/DL (ref 120–199)
CHOLEST/HDLC SERPL: 2.7 {RATIO} (ref 2–5)
CO2 SERPL-SCNC: 22 MMOL/L (ref 23–29)
CREAT SERPL-MCNC: 2 MG/DL (ref 0.5–1.4)
EST. GFR  (NO RACE VARIABLE): 33.7 ML/MIN/1.73 M^2
GLUCOSE SERPL-MCNC: 152 MG/DL (ref 70–110)
HDLC SERPL-MCNC: 39 MG/DL (ref 40–75)
HDLC SERPL: 36.8 % (ref 20–50)
LDLC SERPL CALC-MCNC: 39.6 MG/DL (ref 63–159)
NONHDLC SERPL-MCNC: 67 MG/DL
POTASSIUM SERPL-SCNC: 4.5 MMOL/L (ref 3.5–5.1)
SODIUM SERPL-SCNC: 138 MMOL/L (ref 136–145)
TRIGL SERPL-MCNC: 137 MG/DL (ref 30–150)

## 2024-10-15 PROCEDURE — 36415 COLL VENOUS BLD VENIPUNCTURE: CPT | Mod: HCNC,PN

## 2024-10-15 PROCEDURE — 80061 LIPID PANEL: CPT | Mod: HCNC

## 2024-10-15 PROCEDURE — 80048 BASIC METABOLIC PNL TOTAL CA: CPT | Mod: HCNC

## 2024-10-25 ENCOUNTER — HOSPITAL ENCOUNTER (OUTPATIENT)
Dept: RADIOLOGY | Facility: HOSPITAL | Age: 79
Discharge: HOME | End: 2024-10-25
Attending: INTERNAL MEDICINE
Payer: MEDICARE

## 2024-10-25 DIAGNOSIS — R94.31 NONSPECIFIC ABNORMAL ELECTROCARDIOGRAM (ECG) (EKG): ICD-10-CM

## 2024-10-25 DIAGNOSIS — I10 ESSENTIAL HYPERTENSION, MALIGNANT: ICD-10-CM

## 2024-10-25 DIAGNOSIS — I35.0 AORTIC STENOSIS: ICD-10-CM

## 2024-10-25 DIAGNOSIS — I35.1 AORTIC INCOMPETENCE: ICD-10-CM

## 2024-10-25 DIAGNOSIS — E78.2 MIXED HYPERLIPIDEMIA: ICD-10-CM

## 2024-10-25 DIAGNOSIS — I25.10 CORONARY ATHEROSCLEROSIS OF NATIVE CORONARY ARTERY: ICD-10-CM

## 2024-10-25 PROCEDURE — 71046 X-RAY EXAM CHEST 2 VIEWS: CPT | Mod: 26,HCNC,, | Performed by: RADIOLOGY

## 2024-10-25 PROCEDURE — 71046 X-RAY EXAM CHEST 2 VIEWS: CPT | Mod: TC,HCNC,PN

## 2024-11-03 DIAGNOSIS — D63.8 ANEMIA OF CHRONIC DISEASE: ICD-10-CM

## 2024-11-03 DIAGNOSIS — E03.9 HYPOTHYROIDISM, UNSPECIFIED TYPE: ICD-10-CM

## 2024-11-03 DIAGNOSIS — N28.9 ACUTE RENAL INSUFFICIENCY: ICD-10-CM

## 2024-11-04 RX ORDER — LEVOTHYROXINE SODIUM 88 UG/1
TABLET ORAL
Qty: 90 TABLET | Refills: 1 | Status: SHIPPED | OUTPATIENT
Start: 2024-11-04

## 2024-11-04 NOTE — TELEPHONE ENCOUNTER
No care due was identified.  [facility] Embedded Care Due Messages. Reference number: [id number].   11/03/2024 8:58:51 PM CST

## 2024-11-04 NOTE — TELEPHONE ENCOUNTER
Refill Decision Note   Jadiel Flo  is requesting a refill authorization.  Brief Assessment and Rationale for Refill:  Approve     Medication Therapy Plan: ED visit unrelated to requested rx      Comments:     Note composed:12:14 PM 11/04/2024

## 2024-11-05 DIAGNOSIS — E11.69 TYPE 2 DIABETES MELLITUS WITH OTHER SPECIFIED COMPLICATION, WITHOUT LONG-TERM CURRENT USE OF INSULIN: ICD-10-CM

## 2024-11-05 NOTE — TELEPHONE ENCOUNTER
No care due was identified.  [facility] Embedded Care Due Messages. Reference number: [id number].   11/05/2024 8:04:49 AM CST

## 2024-11-05 NOTE — TELEPHONE ENCOUNTER
Refill Routing Note   Medication(s) are not appropriate for processing by [facility] for the following reason(s):        Required labs abnormal    [facility] action(s):  Defer             Pharmacist review requested: Yes   Extended chart review required: Yes     Appointments  past 12m or future 3m with PCP    Date Provider   Last Visit   5/24/2024 Reji Lemos MD   Next Visit   3/25/2025 Reji Lemos MD   ED visits in past 90 days: 1        Note composed:4:08 PM 11/05/2024

## 2024-11-05 NOTE — TELEPHONE ENCOUNTER
Refill Routing Note   Medication(s) are not appropriate for processing by [facility] for the following reason(s):        Required labs abnormal-Use of Jardiance not recommended when eGFR <30. PT is at 30.1     [facility] action(s):  Defer        Medication Therapy Plan: Use of Jardiance not recommended when eGFR <30.  PT is at 30.1    Pharmacist review requested: Yes   Extended chart review required: Yes     Appointments  past 12m or future 3m with PCP    Date Provider   Last Visit   5/24/2024 Reji Lemos MD   Next Visit   3/25/2025 Reji Lemos MD   ED visits in past 90 days: 1        Note composed:4:52 PM 11/05/2024

## 2024-11-06 RX ORDER — EMPAGLIFLOZIN 10 MG/1
10 TABLET, FILM COATED ORAL
Qty: 90 TABLET | Refills: 3 | Status: SHIPPED | OUTPATIENT
Start: 2024-11-06

## 2024-11-20 ENCOUNTER — E-VISIT (OUTPATIENT)
Dept: FAMILY MEDICINE | Facility: CLINIC | Age: 79
End: 2024-11-20

## 2024-11-20 DIAGNOSIS — Z79.899 DRUG THERAPY: ICD-10-CM

## 2024-11-20 DIAGNOSIS — D64.9 ANEMIA, UNSPECIFIED TYPE: ICD-10-CM

## 2024-11-20 DIAGNOSIS — G25.81 RESTLESS LEG: Primary | ICD-10-CM

## 2024-11-20 RX ORDER — GABAPENTIN 300 MG/1
300 CAPSULE ORAL NIGHTLY
Qty: 90 CAPSULE | Refills: 0 | Status: SHIPPED | OUTPATIENT
Start: 2024-11-20 | End: 2025-11-20

## 2024-11-20 NOTE — PROGRESS NOTES
78-year-old male complains of restless legs for proximally 1-4 weeks.  He is requesting specifically gabapentin.  No prior documented history      1. Restless leg  Ferritin    Iron and TIBC    CBC Auto Differential    gabapentin (NEURONTIN) 300 MG capsule      2. Drug therapy  Ferritin    Iron and TIBC    CBC Auto Differential      3. Anemia, unspecified type  Ferritin    Iron and TIBC    CBC Auto Differential          PLAN          Ordered gabapentin, also ordered iron studies and blood count.

## 2024-12-25 PROBLEM — E87.79 CARDIAC VOLUME OVERLOAD: Status: ACTIVE | Noted: 2024-12-25

## 2024-12-25 PROBLEM — I48.0 PAROXYSMAL ATRIAL FIBRILLATION: Status: ACTIVE | Noted: 2024-12-25

## 2024-12-25 PROBLEM — D63.1 ANEMIA DUE TO CHRONIC KIDNEY DISEASE: Status: ACTIVE | Noted: 2024-12-25

## 2024-12-25 PROBLEM — Z71.89 ADVANCED CARE PLANNING/COUNSELING DISCUSSION: Status: ACTIVE | Noted: 2024-12-25

## 2024-12-25 PROBLEM — N17.9 AKI (ACUTE KIDNEY INJURY): Status: ACTIVE | Noted: 2024-12-25

## 2024-12-25 PROBLEM — E03.9 HYPOTHYROID: Status: ACTIVE | Noted: 2024-12-25

## 2024-12-25 PROBLEM — N18.9 ANEMIA DUE TO CHRONIC KIDNEY DISEASE: Status: ACTIVE | Noted: 2024-12-25

## 2024-12-26 PROBLEM — E66.811 OBESITY (BMI 30.0-34.9): Status: ACTIVE | Noted: 2024-12-26

## 2024-12-26 PROBLEM — F10.10 ALCOHOL ABUSE: Status: ACTIVE | Noted: 2024-12-26

## 2024-12-26 PROBLEM — I50.33 ACUTE ON CHRONIC DIASTOLIC HEART FAILURE: Status: ACTIVE | Noted: 2024-12-25

## 2024-12-30 PROBLEM — Z74.09 OTHER REDUCED MOBILITY: Status: ACTIVE | Noted: 2024-12-30

## 2024-12-31 PROBLEM — R00.1 BRADYCARDIA: Status: ACTIVE | Noted: 2024-12-31

## 2024-12-31 PROBLEM — E83.39 HYPERPHOSPHATEMIA: Status: ACTIVE | Noted: 2024-12-31

## 2025-01-01 PROBLEM — G93.40 ACUTE ENCEPHALOPATHY: Status: ACTIVE | Noted: 2025-01-01

## 2025-01-01 PROBLEM — R06.03 RESPIRATORY DISTRESS: Status: ACTIVE | Noted: 2025-01-01

## 2025-01-04 PROBLEM — Z75.8 DISCHARGE PLANNING ISSUES: Status: ACTIVE | Noted: 2025-01-04

## 2025-01-23 ENCOUNTER — PATIENT MESSAGE (OUTPATIENT)
Dept: FAMILY MEDICINE | Facility: CLINIC | Age: 80
End: 2025-01-23
Payer: MEDICARE

## 2025-01-23 DIAGNOSIS — Z79.899 DRUG THERAPY: Primary | ICD-10-CM

## 2025-01-24 PROCEDURE — G0180 MD CERTIFICATION HHA PATIENT: HCPCS | Mod: ,,, | Performed by: FAMILY MEDICINE

## 2025-01-31 ENCOUNTER — LAB VISIT (OUTPATIENT)
Dept: LAB | Facility: HOSPITAL | Age: 80
End: 2025-01-31
Attending: FAMILY MEDICINE
Payer: MEDICARE

## 2025-01-31 DIAGNOSIS — Z79.899 DRUG THERAPY: ICD-10-CM

## 2025-01-31 PROCEDURE — 81001 URINALYSIS AUTO W/SCOPE: CPT | Performed by: FAMILY MEDICINE

## 2025-02-01 LAB
BACTERIA #/AREA URNS AUTO: ABNORMAL /HPF
BILIRUB UR QL STRIP: NEGATIVE
CLARITY UR REFRACT.AUTO: CLEAR
COLOR UR AUTO: YELLOW
GLUCOSE UR QL STRIP: ABNORMAL
HGB UR QL STRIP: NEGATIVE
HYALINE CASTS UR QL AUTO: 12 /LPF
KETONES UR QL STRIP: NEGATIVE
LEUKOCYTE ESTERASE UR QL STRIP: NEGATIVE
MICROSCOPIC COMMENT: ABNORMAL
NITRITE UR QL STRIP: NEGATIVE
PH UR STRIP: 5 [PH] (ref 5–8)
PROT UR QL STRIP: ABNORMAL
RBC #/AREA URNS AUTO: 3 /HPF (ref 0–4)
SP GR UR STRIP: 1.01 (ref 1–1.03)
SQUAMOUS #/AREA URNS AUTO: 2 /HPF
UNSPECIFIED CRY UR QL COMP ASSIST: 2
URN SPEC COLLECT METH UR: ABNORMAL
WBC #/AREA URNS AUTO: 9 /HPF (ref 0–5)
YEAST UR QL AUTO: ABNORMAL

## 2025-02-05 ENCOUNTER — OFFICE VISIT (OUTPATIENT)
Dept: FAMILY MEDICINE | Facility: CLINIC | Age: 80
End: 2025-02-05
Payer: MEDICARE

## 2025-02-05 VITALS
WEIGHT: 193.13 LBS | SYSTOLIC BLOOD PRESSURE: 114 MMHG | HEIGHT: 70 IN | DIASTOLIC BLOOD PRESSURE: 62 MMHG | RESPIRATION RATE: 16 BRPM | OXYGEN SATURATION: 98 % | BODY MASS INDEX: 27.65 KG/M2 | HEART RATE: 78 BPM | TEMPERATURE: 98 F

## 2025-02-05 DIAGNOSIS — I50.33 ACUTE ON CHRONIC DIASTOLIC HEART FAILURE: Primary | ICD-10-CM

## 2025-02-05 DIAGNOSIS — G47.30 BREATHING-RELATED SLEEP DISORDER: ICD-10-CM

## 2025-02-05 DIAGNOSIS — Z95.0 PACEMAKER: ICD-10-CM

## 2025-02-05 DIAGNOSIS — I27.20 PULMONARY HYPERTENSION: ICD-10-CM

## 2025-02-05 DIAGNOSIS — Z98.61 POSTSURGICAL PERCUTANEOUS TRANSLUMINAL CORONARY ANGIOPLASTY STATUS: ICD-10-CM

## 2025-02-05 DIAGNOSIS — N18.9 CHRONIC KIDNEY DISEASE, UNSPECIFIED: ICD-10-CM

## 2025-02-05 DIAGNOSIS — I48.0 PAROXYSMAL ATRIAL FIBRILLATION: ICD-10-CM

## 2025-02-05 DIAGNOSIS — Z79.01 LONG TERM (CURRENT) USE OF ANTICOAGULANTS: ICD-10-CM

## 2025-02-05 DIAGNOSIS — I35.1 AORTIC INCOMPETENCE: ICD-10-CM

## 2025-02-05 DIAGNOSIS — R74.01 TRANSAMINITIS: ICD-10-CM

## 2025-02-05 DIAGNOSIS — I10 ESSENTIAL HYPERTENSION, MALIGNANT: ICD-10-CM

## 2025-02-05 DIAGNOSIS — E88.09 HYPOALBUMINEMIA: ICD-10-CM

## 2025-02-05 DIAGNOSIS — N17.9 AKI (ACUTE KIDNEY INJURY): ICD-10-CM

## 2025-02-05 DIAGNOSIS — R06.03 RESPIRATORY DISTRESS: ICD-10-CM

## 2025-02-05 DIAGNOSIS — G93.40 ACUTE ENCEPHALOPATHY: ICD-10-CM

## 2025-02-05 DIAGNOSIS — I25.10 CORONARY ATHEROSCLEROSIS OF NATIVE CORONARY ARTERY: ICD-10-CM

## 2025-02-05 DIAGNOSIS — Z23 IMMUNIZATION DUE: ICD-10-CM

## 2025-02-05 DIAGNOSIS — R00.1 BRADYCARDIA: ICD-10-CM

## 2025-02-05 DIAGNOSIS — Z95.5 S/P CORONARY ARTERY STENT PLACEMENT: ICD-10-CM

## 2025-02-05 PROCEDURE — 1125F AMNT PAIN NOTED PAIN PRSNT: CPT | Mod: CPTII,S$GLB,, | Performed by: FAMILY MEDICINE

## 2025-02-05 PROCEDURE — 3074F SYST BP LT 130 MM HG: CPT | Mod: CPTII,S$GLB,, | Performed by: FAMILY MEDICINE

## 2025-02-05 PROCEDURE — 1159F MED LIST DOCD IN RCRD: CPT | Mod: CPTII,S$GLB,, | Performed by: FAMILY MEDICINE

## 2025-02-05 PROCEDURE — 3288F FALL RISK ASSESSMENT DOCD: CPT | Mod: CPTII,S$GLB,, | Performed by: FAMILY MEDICINE

## 2025-02-05 PROCEDURE — 3078F DIAST BP <80 MM HG: CPT | Mod: CPTII,S$GLB,, | Performed by: FAMILY MEDICINE

## 2025-02-05 PROCEDURE — 1111F DSCHRG MED/CURRENT MED MERGE: CPT | Mod: CPTII,S$GLB,, | Performed by: FAMILY MEDICINE

## 2025-02-05 PROCEDURE — 99999 PR PBB SHADOW E&M-EST. PATIENT-LVL V: CPT | Mod: PBBFAC,,, | Performed by: FAMILY MEDICINE

## 2025-02-05 PROCEDURE — 99214 OFFICE O/P EST MOD 30 MIN: CPT | Mod: S$GLB,,, | Performed by: FAMILY MEDICINE

## 2025-02-05 PROCEDURE — 1101F PT FALLS ASSESS-DOCD LE1/YR: CPT | Mod: CPTII,S$GLB,, | Performed by: FAMILY MEDICINE

## 2025-02-05 RX ORDER — METOPROLOL SUCCINATE 25 MG/1
25 TABLET, EXTENDED RELEASE ORAL 2 TIMES DAILY
COMMUNITY

## 2025-02-05 RX ORDER — METHOCARBAMOL 750 MG/1
750 TABLET, FILM COATED ORAL EVERY 12 HOURS PRN
COMMUNITY
Start: 2025-01-17

## 2025-02-05 RX ORDER — APIXABAN 5 MG/1
5 TABLET, FILM COATED ORAL 2 TIMES DAILY
COMMUNITY
Start: 2025-01-20

## 2025-02-05 RX ORDER — ATORVASTATIN CALCIUM 20 MG/1
20 TABLET, FILM COATED ORAL NIGHTLY
Qty: 90 TABLET | Refills: 3 | Status: SHIPPED | OUTPATIENT
Start: 2025-02-05 | End: 2026-02-05

## 2025-02-05 NOTE — PROGRESS NOTES
THIS DOCUMENT WAS MADE IN PART WITH VOICE RECOGNITION SOFTWARE.  OCCASIONALLY THIS SOFTWARE WILL MISINTERPRET WORDS OR PHRASES.    Assessment and Plan:    1. Acute on chronic diastolic heart failure        2. DEVIN (acute kidney injury)        3. Bradycardia        4. Pacemaker        5. Acute encephalopathy        6. Respiratory distress        7. Pulmonary hypertension        8. Transaminitis        9. S/P coronary artery stent placement  atorvastatin (LIPITOR) 20 MG tablet      10. Hypoalbuminemia        11. Immunization due        12. Essential hypertension, malignant  LIPID PANEL    COMPREHENSIVE METABOLIC PANEL      13. Paroxysmal atrial fibrillation  LIPID PANEL    COMPREHENSIVE METABOLIC PANEL      14. Coronary atherosclerosis of native coronary artery  LIPID PANEL    COMPREHENSIVE METABOLIC PANEL      15. Long term (current) use of anticoagulants  LIPID PANEL    COMPREHENSIVE METABOLIC PANEL      16. Postsurgical percutaneous transluminal coronary angioplasty status  LIPID PANEL    COMPREHENSIVE METABOLIC PANEL      17. Chronic kidney disease, unspecified  LIPID PANEL    COMPREHENSIVE METABOLIC PANEL      18. Breathing-related sleep disorder  LIPID PANEL    COMPREHENSIVE METABOLIC PANEL      19. Aortic incompetence  LIPID PANEL    COMPREHENSIVE METABOLIC PANEL            Assessment & Plan    PLAN SUMMARY:   Continue furosemide (Lasix) as needed for rapid weight gain of 4-5 lbs   Continue Jardiance for diabetes management and heart health   Restart atorvastatin 20 mg daily (split 40 mg tablets in half)   Continue metoprolol 25 mg twice daily   Discontinue Zetia   Continue Entresto for heart failure management   Continue physical therapy exercises   Order fasting labs in 3 months for cholesterol panel, liver enzymes, and protein levels   Follow-up with Dr. Gaytan in 2 months for echocardiogram   Limit salt intake   Increase protein intake for muscle recovery   Follow-up in 3 months to review lab results and  overall health status    HEART FAILURE:   Assessed the patient's recent heart failure episode and current condition.   Noted patient's weight at hospital admission was 210 lbs, at discharge was 193 lbs (17 lbs fluid loss), and current weight remains at 193 lbs.   Reviewed echocardiogram from [facility]st, which showed a good EF, indicating mild heart failure at the time.   Examined the patient and found no fluid on lungs or swelling in legs.   Emphasized the importance of daily weight monitoring and fluid management for heart failure.   Instructed the patient to take furosemide (Lasix) if weight increases by 4-5 lbs rapidly.   Continued furosemide (Lasix) as needed for weight gain of 4-5 lbs.   Continued metoprolol 25 mg twice daily (recently increased by Dr. Ward).   Continued Entresto for heart failure management.   Educated the patient on signs of fluid retention and heart failure exacerbation.   Scheduled follow-up with Dr. Gaytan in 2 months for echocardiogram.    PULMONARY HYPERTENSION:   Noted right heart catheterization showed elevated pulmonary artery pressure of 80 mm Hg, indicating pulmonary hypertension.    BRADYCARDIA:   Noted pacemaker placement during hospital stay due to persistent bradycardia.   Confirmed pacemaker is functioning properly, maintaining heart rate above 60 bpm.   Recorded current heart rate at 78 bpm.    KIDNEY DISEASE:   Reviewed patient's history of kidney disease with baseline creatinine ranging from 1.4 to 1.6.   Noted recent creatinine level on 31st was 1.5, indicating kidney function is back to baseline.   Discussed kidney function and preventive measures with the patient.    LIVER FUNCTION:   Ordered fasting labs in 3 months to check liver enzymes.   Noted liver enzymes were elevated during hospital stay but returned to normal by 31st.   Reviewed ultrasound results showing fatty liver.   Discussed patient's history of alcohol consumption.    CORONARY ARTERY DISEASE:   Noted patient's  history of heart disease with 2-3 stents placed.   Restarted atorvastatin to protect heart from further heart disease.    HYPERLIPIDEMIA:   Reviewed recent cholesterol levels: LDL above 90, HDL 32, noting previous levels were better (65, 60, 37, 39).   Discussed cholesterol levels and management with the patient.   Started atorvastatin 20 mg daily (instructed patient to split 40 mg table  ts in half).   Discontinued Zetia.   Ordered fasting labs in 3 months for cholesterol panel.    DIABETES:   Noted patient's history of well-controlled diabetes.   Continued Jardiance for diabetes management, which also benefits kidney function and heart health.   Noted Jardiance also helps prevent heart failure.    HYPERTENSION:   Noted blood pressure is currently well-controlled.   Continued metoprolol 25 mg twice daily (recently increased by Dr. Ward).    NUTRITION AND EXERCISE:   Discussed protein intake for muscle recovery and overall health.   Recommend increasing protein intake to support muscle recovery.   Jadiel to continue physical therapy exercises.   Recommend limiting salt intake.    LABS:   Fasting labs ordered in 3 months (protein levels).    FOLLOW UP:   Follow up in 3 months to review lab results and overall health status.            Visit today included increased complexity associated with the care of the episodic problem above addressed and managing the longitudinal care of the patient due to the serious and/or complex managed problem(s) .      ______________________________________________________________________  Subjective:    Chief Complaint:  Chief Complaint   Patient presents with    Hospital Follow Up        HPI:  Jadiel is a 79 y.o. year old       History of Present Illness    CHIEF COMPLAINT:  Jadiel presents today for hospital follow-up after a two-week hospitalization starting on Diane Day.    HISTORY OF PRESENT ILLNESS:  He was admitted to the ED with worsening shortness of breath. During  "hospitalization, he required temporary dialysis due to worsening kidney function. His weight decreased from 210 lbs at admission to 193 lbs at discharge, indicating a 17-pound loss over approximately two weeks. Following discharge, he completed 7 days of inpatient rehabilitation. He currently receives home health services, including nursing visits and physical therapy.    CURRENT STATUS:  He reports feeling significantly better with improved energy, stating it's the best he has felt in a long time. His appetite has improved after being poor during his hospital and rehabilitation stays. He can manage 14 stairs without difficulty.    MUSCULOSKELETAL:  He experienced increased knee pain during rehabilitation, which he attributes to prolonged bed rest during hospitalization.    DIET:  His wife monitors his diet and prepares meals with salt restrictions.    SOCIAL HISTORY:  He denies recent alcohol use, reporting abstinence for the past couple of months.      ROS:  ROS as indicated in HPI.             History atrial fibrillation   Rx-Eliquis, Toprol XL 50   Cardiology- MD Ed   EP - MD Hector   "10% a fib" per Dr Tracy    Bradycardia  S/p Pacemaker    CHF  Rx : Jardiance, Beta blocker, Entresto, LASIX PRN         Type 2 diabetes mellitus   Rx :  Jardiance  Prev Rx-metformin  Prev a1c : 5.9      Obstructive sleep apnea   CPAP intolerant   "Not candidate for Inspire" per SLENT      Dyslipidemia , bilateral carotid artery disease, CAD s/p PCI x2-3  Rx-atorvastatin 20 mg   No exertional CP / SOB  Cardiology : MD Ed       Hypothyroidism   Rx-Synthroid 88 mcg   TSH nl      Essential hypertension   Rx- amlodipine 5 mg, Entresto BID, Metoprolol 50 mg , HCTZ\ 12.5      Anxiety   No current medication     Chronic kidney disease, proteinuria  Baseline creatinine- 1.4-1.6     History iron deficiency anemia  Baseline HCT 30-33   Taking OTC supplements      Persistent asthma   Rx-rescue inhaler    Fatty Liver       Past " Medical History:  Past Medical History:   Diagnosis Date    Anemia     Anxiety     Asthma     Atrial fibrillation     admitted to [facility] 7/1/18 for AF w RVR; no cardioversion needed; Dr Cinthya GUTIERREZ. Slowed down on its own and back to NSR; placed on eliquis.     Carotid artery plaque     bilateral    Diabetes mellitus type 2 without retinopathy 02/20/2024    Humphrey Ramirez MD    Diabetes mellitus, type 2     Essential hypertension     Hypertension     Hypothyroidism     Liver dysfunction     Methylenetetrahydrofolate reductase (MTHFR) gene mutation     Mixed hyperlipidemia     Obesity     ROBBY (obstructive sleep apnea)     Proteinuria     Valvular heart disease        Past Surgical History:  Past Surgical History:   Procedure Laterality Date    APPENDECTOMY  5241-2128    COLONOSCOPY N/A 05/01/2019    Procedure: COLONOSCOPY;  Surgeon: Jimenez Solomon Jr., MD;  Location: [facility];  Service: Endoscopy;  Laterality: N/A;    CORONARY ANGIOPLASTY WITH STENT PLACEMENT  2022    CORONARY STENT PLACEMENT  04/25/2017    LAD by Dr Ward    ESOPHAGOGASTRODUODENOSCOPY N/A 05/01/2019    Procedure: EGD (ESOPHAGOGASTRODUODENOSCOPY);  Surgeon: Jimenez Solomon Jr., MD;  Location: [facility];  Service: Endoscopy;  Laterality: N/A;    IMPLANTATION OF LEADLESS PACEMAKER  12/31/2024    Procedure: Insertion Micra PPM ([employer]);  Surgeon: Reginald Looney MD;  Location: [facility];  Service: Cardiology;;    INSERTION OF IMPLANTABLE LOOP RECORDER  06/2024    PLACEMENT OF DIALYSIS ACCESS  12/31/2024    Procedure: Dialysis catheter insertion;  Surgeon: Reginald Looney MD;  Location: [facility];  Service: Cardiology;;    RIGHT HEART CATHETERIZATION  12/31/2024    Procedure: Right heart cath;  Surgeon: Reginald Looney MD;  Location: [facility];  Service: Cardiology;;    TRANSESOPHAGEAL ECHOCARDIOGRAM WITH POSSIBLE CARDIOVERSION (YNES W/ POSS CARDIOVERSION) N/A 8/6/2024    Procedure: TRANSESOPHAGEAL ECHOCARDIOGRAM WITH POSSIBLE CARDIOVERSION (YNES  W/ POSS CARDIOVERSION);  Surgeon: Miguel Ward MD;  Location: [facility];  Service: Cardiology;  Laterality: N/A;       Family History:  Family History   Problem Relation Name Age of Onset    Cancer Mother      Heart disease Mother      Heart disease Father      Arthritis Father      Hypertension Father      Cancer Sister          breast cancer    Diabetes Sister      Asthma Brother      COPD Brother      Diabetes Brother      Stroke Brother      Cancer Sister          leukemia    Early death Sister           at 64 of leukemia    Colon cancer Neg Hx      Crohn's disease Neg Hx      Esophageal cancer Neg Hx      Ulcerative colitis Neg Hx      Stomach cancer Neg Hx         Social History:  Social History     Socioeconomic History    Marital status:      Spouse name: Gwen   Occupational History    Occupation:  HS.   Tobacco Use    Smoking status: Never    Smokeless tobacco: Never   Substance and Sexual Activity    Alcohol use: Yes     Alcohol/week: 8.0 standard drinks of alcohol     Types: 8 Shots of liquor per week    Drug use: No     Social Drivers of Health     Financial Resource Strain: Low Risk  (2/3/2025)    Overall Financial Resource Strain (CARDIA)     Difficulty of Paying Living Expenses: Not hard at all   Food Insecurity: No Food Insecurity (2/3/2025)    Hunger Vital Sign     Worried About Running Out of Food in the Last Year: Never true     Ran Out of Food in the Last Year: Never true   Transportation Needs: No Transportation Needs (2024)    TRANSPORTATION NEEDS     Transportation : No   Physical Activity: Unknown (2/3/2025)    Exercise Vital Sign     Days of Exercise per Week: 1 day   Stress: No Stress Concern Present (2/3/2025)    [employer] [place] [employer] - Occupational Stress Questionnaire     Feeling of Stress : Only a little   Housing Stability: Low Risk  (2/3/2025)    Housing Stability Vital Sign     Unable to Pay for Housing in the Last Year: No      Homeless in the Last Year: No       Medications:  Current Outpatient Medications on File Prior to Visit   Medication Sig Dispense Refill    acetaminophen (TYLENOL) 325 MG tablet Take 2 tablets (650 mg total) by mouth every 4 (four) hours as needed for Pain.      albuterol (PROVENTIL/VENTOLIN HFA) 90 mcg/actuation inhaler INHALE 2 PUFFS INTO THE LUNGS EVERY 6 HOURS AS NEEDED FOR WHEEZING 54 g 3    allopurinoL (ZYLOPRIM) 100 MG tablet Take 1 tablet (100 mg total) by mouth once daily. 30 tablet 0    amLODIPine (NORVASC) 5 MG tablet TAKE 1 TABLET(5 MG) BY MOUTH EVERY DAY 90 tablet 3    blood sugar diagnostic Strp To check BG once daily before a meal, to use with insurance preferred meter 100 strip 3    clopidogreL (PLAVIX) 75 mg tablet Take 75 mg by mouth once daily.      ELIQUIS 5 mg Tab Take 5 mg by mouth 2 (two) times daily.      empagliflozin (JARDIANCE) 10 mg tablet TAKE 1 TABLET(10 MG) BY MOUTH DAILY 90 tablet 3    ferrous sulfate 325 (65 FE) MG EC tablet Take 1 tablet (325 mg total) by mouth once daily.      folic acid (FOLVITE) 1 MG tablet Take 1 tablet (1 mg total) by mouth once daily. 30 tablet 0    lancets Misc To check BG once daily before a meal, to use with insurance preferred meter 100 each 3    levothyroxine (SYNTHROID) 88 MCG tablet TAKE 1 TABLET(88 MCG) BY MOUTH BEFORE BREAKFAST 90 tablet 1    methocarbamoL (ROBAXIN) 750 MG Tab Take 750 mg by mouth every 12 (twelve) hours as needed.      metoprolol succinate (TOPROL-XL) 25 MG 24 hr tablet Take 25 mg by mouth 2 (two) times daily.      nitroGLYCERIN (NITROSTAT) 0.4 MG SL tablet   0    sacubitriL-valsartan (ENTRESTO) 24-26 mg per tablet Take 1 tablet by mouth 2 (two) times daily. 60 tablet 0    thiamine 100 MG tablet Take 1 tablet (100 mg total) by mouth once daily. 30 tablet 0    vitamin renal formula, B-complex-vitamin c-folic acid, (NEPHROCAP) 1 mg Cap Take 1 capsule by mouth once daily. 90 capsule 3    [DISCONTINUED] ezetimibe (ZETIA) 10 mg tablet  TAKE 1/2 TABLET BY MOUTH DAILY 45 tablet 3    [DISCONTINUED] apixaban (ELIQUIS) 2.5 mg Tab Take 1 tablet (2.5 mg total) by mouth 2 (two) times daily. (Patient not taking: Reported on 2/5/2025) 60 tablet 0    [DISCONTINUED] calcium carbonate (TUMS) 200 mg calcium (500 mg) chewable tablet Take 1 tablet (500 mg total) by mouth 2 (two) times daily as needed for Heartburn. (Patient not taking: Reported on 2/5/2025)      [DISCONTINUED] docusate sodium (COLACE) 100 MG capsule Take 1 capsule (100 mg total) by mouth once daily. (Patient not taking: Reported on 2/5/2025)      [DISCONTINUED] famotidine (PEPCID) 20 MG tablet Take 1 tablet (20 mg total) by mouth once daily. for 14 days (Patient not taking: Reported on 2/5/2025) 14 tablet 0    [DISCONTINUED] HYDROcodone-acetaminophen (NORCO) 5-325 mg per tablet Take 1 tablet by mouth every 6 (six) hours as needed. (Patient not taking: Reported on 2/5/2025) 12 tablet 0    [DISCONTINUED] metoprolol succinate (TOPROL-XL) 25 MG 24 hr tablet Take 1 tablet (25 mg total) by mouth once daily. (Patient not taking: Reported on 2/5/2025) 30 tablet 0     No current facility-administered medications on file prior to visit.       Allergies:  Multaq [dronedarone]    Immunizations:  Immunization History   Administered Date(s) Administered    COVID-19, MRNA, LN-S, PF (Pfizer) (Purple Cap) 01/10/2021, 01/31/2021, 08/19/2021    Influenza (FLUAD) - Quadrivalent - Adjuvanted - PF *Preferred* (65+) 09/24/2020    Influenza - Trivalent - Fluzone High Dose - PF (65 years and older) 09/10/2018, 03/05/2020    PPD Test 01/06/2025    Pneumococcal Conjugate - 13 Valent 09/10/2018    Pneumococcal Polysaccharide - 23 Valent 06/27/2023    Rsv, Bivalent, Rsvpref (Abrysvo) 12/06/2023    Tdap 08/24/2022    Zoster Recombinant 12/06/2023, 04/29/2024       Review of Systems:  Review of Systems   Constitutional:  Positive for unexpected weight change. Negative for activity change.   HENT:  Negative for hearing loss,  "rhinorrhea and trouble swallowing.    Eyes:  Negative for discharge and visual disturbance.   Respiratory:  Negative for chest tightness and wheezing.    Cardiovascular:  Negative for chest pain and palpitations.   Gastrointestinal:  Negative for blood in stool, constipation, diarrhea and vomiting.   Endocrine: Negative for polydipsia and polyuria.   Genitourinary:  Negative for difficulty urinating, hematuria and urgency.   Musculoskeletal:  Negative for arthralgias, joint swelling and neck pain.   Neurological:  Negative for weakness and headaches.   Psychiatric/Behavioral:  Negative for confusion and dysphoric mood.    All other systems reviewed and are negative.      Objective:    Vitals:  Vitals:    02/05/25 0842   BP: 114/62   Pulse: 78   Resp: 16   Temp: 98.1 °F (36.7 °C)   TempSrc: Oral   SpO2: 98%   Weight: 87.6 kg (193 lb 2 oz)   Height: 5' 10" (1.778 m)   PainSc:   3   PainLoc: Foot       Physical Exam  Vitals reviewed.   Constitutional:       General: He is not in acute distress.  HENT:      Head: Normocephalic and atraumatic.   Eyes:      Pupils: Pupils are equal, round, and reactive to light.   Cardiovascular:      Rate and Rhythm: Normal rate and regular rhythm.      Heart sounds: No murmur heard.     No friction rub.   Pulmonary:      Effort: Pulmonary effort is normal.      Breath sounds: Normal breath sounds.   Abdominal:      General: Bowel sounds are normal. There is no distension.      Palpations: Abdomen is soft.      Tenderness: There is no abdominal tenderness.   Musculoskeletal:      Cervical back: Neck supple.   Skin:     General: Skin is warm and dry.      Findings: No rash.   Psychiatric:         Behavior: Behavior normal.             Reji Lemos MD  Family Medicine        "

## 2025-02-13 ENCOUNTER — EXTERNAL HOME HEALTH (OUTPATIENT)
Dept: HOME HEALTH SERVICES | Facility: HOSPITAL | Age: 80
End: 2025-02-13
Payer: MEDICARE

## 2025-02-17 ENCOUNTER — TELEPHONE (OUTPATIENT)
Dept: FAMILY MEDICINE | Facility: CLINIC | Age: 80
End: 2025-02-17
Payer: MEDICARE

## 2025-02-17 DIAGNOSIS — F10.10 ALCOHOL ABUSE: Primary | ICD-10-CM

## 2025-02-17 RX ORDER — FOLIC ACID 1 MG/1
1 TABLET ORAL DAILY
Qty: 90 TABLET | Refills: 3 | Status: SHIPPED | OUTPATIENT
Start: 2025-02-17 | End: 2026-02-17

## 2025-02-17 RX ORDER — FOLIC ACID 1 MG/1
1 TABLET ORAL DAILY
Qty: 30 TABLET | Refills: 3 | Status: CANCELLED | OUTPATIENT
Start: 2025-02-17 | End: 2026-02-17

## 2025-02-17 NOTE — TELEPHONE ENCOUNTER
Spoke w/ pt. Was rx'd folic acid while in hosp. Was given 30d supply which is now out. Please review and and if pt should continue, please send refill. Pended.

## 2025-02-17 NOTE — TELEPHONE ENCOUNTER
----- Message from Silvia sent at 2/14/2025  2:46 PM CST -----  Type: Needs Medical Advice  Who Called:  pt  Preferred Pharmacy and Phone Number:   [id number] - [place] [address] AT [address]  [facility] [address]  Phone: [phone] Fax: [phone]  Prescription: folic acid (FOLVITE) 1 MG tablet  Best Call Back Number: [phone]  Additional Information: pt is calling in regards to needing to speak to a nurse in office. Pt states he was in the hospital and his folic acid was not renewed. Needs to know if he needs to keep taking the Rx and if so if it can be renewed. Please call back and advise. Thanks!

## 2025-02-18 NOTE — TELEPHONE ENCOUNTER
Tried to reach pt. No answer. Left message for pt that refill has been done, to check w/ pharm and call us back PRN

## 2025-02-22 DIAGNOSIS — Z00.00 ENCOUNTER FOR MEDICARE ANNUAL WELLNESS EXAM: ICD-10-CM

## 2025-03-13 DIAGNOSIS — D63.8 ANEMIA OF CHRONIC DISEASE: ICD-10-CM

## 2025-03-13 DIAGNOSIS — N18.31 STAGE 3A CHRONIC KIDNEY DISEASE: ICD-10-CM

## 2025-03-13 DIAGNOSIS — D53.9 MACROCYTIC ANEMIA: ICD-10-CM

## 2025-03-13 RX ORDER — ALLOPURINOL 100 MG/1
100 TABLET ORAL DAILY
Qty: 90 TABLET | Refills: 1 | Status: SHIPPED | OUTPATIENT
Start: 2025-03-13

## 2025-03-13 NOTE — TELEPHONE ENCOUNTER
Patient states his visit on 2/5 was a hospital follow up    He was prescribed allopurinol and renals tabs, asking for refills

## 2025-03-13 NOTE — TELEPHONE ENCOUNTER
No care due was identified.  [facility] Embedded Care Due Messages. Reference number: [id number].   3/13/2025 7:27:18 AM CDT

## 2025-03-15 ENCOUNTER — DOCUMENT SCAN (OUTPATIENT)
Dept: HOME HEALTH SERVICES | Facility: HOSPITAL | Age: 80
End: 2025-03-15
Payer: MEDICARE

## 2025-04-07 ENCOUNTER — DOCUMENT SCAN (OUTPATIENT)
Dept: HOME HEALTH SERVICES | Facility: HOSPITAL | Age: 80
End: 2025-04-07
Payer: MEDICARE

## 2025-04-16 ENCOUNTER — LAB VISIT (OUTPATIENT)
Dept: LAB | Facility: HOSPITAL | Age: 80
End: 2025-04-16
Payer: MEDICARE

## 2025-04-16 DIAGNOSIS — N18.9 CHRONIC KIDNEY DISEASE, UNSPECIFIED: ICD-10-CM

## 2025-04-16 DIAGNOSIS — Z95.818 STATUS POST BLALOCK-TAUSSIG SHUNT: ICD-10-CM

## 2025-04-16 DIAGNOSIS — G47.30 BREATHING-RELATED SLEEP DISORDER: ICD-10-CM

## 2025-04-16 DIAGNOSIS — I10 ESSENTIAL HYPERTENSION, MALIGNANT: ICD-10-CM

## 2025-04-16 DIAGNOSIS — Z79.01 LONG TERM (CURRENT) USE OF ANTICOAGULANTS: ICD-10-CM

## 2025-04-16 DIAGNOSIS — E78.2 MIXED HYPERLIPIDEMIA: ICD-10-CM

## 2025-04-16 DIAGNOSIS — I25.10 CORONARY ATHEROSCLEROSIS OF NATIVE CORONARY ARTERY: ICD-10-CM

## 2025-04-16 DIAGNOSIS — I35.0 NODULAR CALCIFIC AORTIC VALVE STENOSIS: Primary | ICD-10-CM

## 2025-04-16 LAB
ANION GAP (OHS): 8 MMOL/L (ref 8–16)
BUN SERPL-MCNC: 30 MG/DL (ref 8–23)
CALCIUM SERPL-MCNC: 9.7 MG/DL (ref 8.7–10.5)
CHLORIDE SERPL-SCNC: 107 MMOL/L (ref 95–110)
CO2 SERPL-SCNC: 23 MMOL/L (ref 23–29)
CREAT SERPL-MCNC: 1.4 MG/DL (ref 0.5–1.4)
ERYTHROCYTE [DISTWIDTH] IN BLOOD BY AUTOMATED COUNT: 14.6 % (ref 11.5–14.5)
FERRITIN SERPL-MCNC: 280 NG/ML (ref 20–300)
GFR SERPLBLD CREATININE-BSD FMLA CKD-EPI: 51 ML/MIN/1.73/M2
GLUCOSE SERPL-MCNC: 151 MG/DL (ref 70–110)
HCT VFR BLD AUTO: 36.9 % (ref 40–54)
HGB BLD-MCNC: 11.1 GM/DL (ref 14–18)
IRON SATN MFR SERPL: 28 % (ref 20–50)
IRON SERPL-MCNC: 73 UG/DL (ref 45–160)
MCH RBC QN AUTO: 28.5 PG (ref 27–31)
MCHC RBC AUTO-ENTMCNC: 30.1 G/DL (ref 32–36)
MCV RBC AUTO: 95 FL (ref 82–98)
PLATELET # BLD AUTO: 298 K/UL (ref 150–450)
PMV BLD AUTO: 9.7 FL (ref 9.2–12.9)
POTASSIUM SERPL-SCNC: 4.5 MMOL/L (ref 3.5–5.1)
RBC # BLD AUTO: 3.89 M/UL (ref 4.6–6.2)
SODIUM SERPL-SCNC: 138 MMOL/L (ref 136–145)
TIBC SERPL-MCNC: 262 UG/DL (ref 250–450)
TRANSFERRIN SERPL-MCNC: 177 MG/DL (ref 200–375)
WBC # BLD AUTO: 9.56 K/UL (ref 3.9–12.7)

## 2025-04-16 PROCEDURE — 82728 ASSAY OF FERRITIN: CPT

## 2025-04-16 PROCEDURE — 85027 COMPLETE CBC AUTOMATED: CPT

## 2025-04-16 PROCEDURE — 36415 COLL VENOUS BLD VENIPUNCTURE: CPT | Mod: PN

## 2025-04-16 PROCEDURE — 84466 ASSAY OF TRANSFERRIN: CPT

## 2025-04-16 PROCEDURE — 82310 ASSAY OF CALCIUM: CPT

## 2025-05-01 ENCOUNTER — LAB VISIT (OUTPATIENT)
Dept: LAB | Facility: HOSPITAL | Age: 80
End: 2025-05-01
Attending: FAMILY MEDICINE
Payer: MEDICARE

## 2025-05-01 DIAGNOSIS — Z98.61 POSTSURGICAL PERCUTANEOUS TRANSLUMINAL CORONARY ANGIOPLASTY STATUS: ICD-10-CM

## 2025-05-01 DIAGNOSIS — I35.1 AORTIC INCOMPETENCE: ICD-10-CM

## 2025-05-01 DIAGNOSIS — I10 ESSENTIAL HYPERTENSION, MALIGNANT: ICD-10-CM

## 2025-05-01 DIAGNOSIS — I48.0 PAROXYSMAL ATRIAL FIBRILLATION: ICD-10-CM

## 2025-05-01 DIAGNOSIS — N18.9 CHRONIC KIDNEY DISEASE, UNSPECIFIED: ICD-10-CM

## 2025-05-01 DIAGNOSIS — I25.10 CORONARY ATHEROSCLEROSIS OF NATIVE CORONARY ARTERY: ICD-10-CM

## 2025-05-01 DIAGNOSIS — G47.30 BREATHING-RELATED SLEEP DISORDER: ICD-10-CM

## 2025-05-01 DIAGNOSIS — Z79.899 DRUG THERAPY: ICD-10-CM

## 2025-05-01 DIAGNOSIS — Z79.01 LONG TERM (CURRENT) USE OF ANTICOAGULANTS: ICD-10-CM

## 2025-05-01 LAB
ABSOLUTE EOSINOPHIL (OHS): 0.59 K/UL
ABSOLUTE MONOCYTE (OHS): 0.86 K/UL (ref 0.3–1)
ABSOLUTE NEUTROPHIL COUNT (OHS): 5.03 K/UL (ref 1.8–7.7)
ALBUMIN SERPL BCP-MCNC: 3.6 G/DL (ref 3.5–5.2)
ALP SERPL-CCNC: 67 UNIT/L (ref 40–150)
ALT SERPL W/O P-5'-P-CCNC: 19 UNIT/L (ref 10–44)
ANION GAP (OHS): 10 MMOL/L (ref 8–16)
AST SERPL-CCNC: 19 UNIT/L (ref 11–45)
BASOPHILS # BLD AUTO: 0.08 K/UL
BASOPHILS NFR BLD AUTO: 1 %
BILIRUB SERPL-MCNC: 0.4 MG/DL (ref 0.1–1)
BUN SERPL-MCNC: 33 MG/DL (ref 8–23)
CALCIUM SERPL-MCNC: 9.6 MG/DL (ref 8.7–10.5)
CHLORIDE SERPL-SCNC: 106 MMOL/L (ref 95–110)
CHOLEST SERPL-MCNC: 133 MG/DL (ref 120–199)
CHOLEST/HDLC SERPL: 4.3 {RATIO} (ref 2–5)
CO2 SERPL-SCNC: 25 MMOL/L (ref 23–29)
CREAT SERPL-MCNC: 1.4 MG/DL (ref 0.5–1.4)
ERYTHROCYTE [DISTWIDTH] IN BLOOD BY AUTOMATED COUNT: 15.6 % (ref 11.5–14.5)
GFR SERPLBLD CREATININE-BSD FMLA CKD-EPI: 51 ML/MIN/1.73/M2
GLUCOSE SERPL-MCNC: 103 MG/DL (ref 70–110)
HCT VFR BLD AUTO: 35.9 % (ref 40–54)
HDLC SERPL-MCNC: 31 MG/DL (ref 40–75)
HDLC SERPL: 23.3 % (ref 20–50)
HGB BLD-MCNC: 10.8 GM/DL (ref 14–18)
IMM GRANULOCYTES # BLD AUTO: 0.02 K/UL (ref 0–0.04)
IMM GRANULOCYTES NFR BLD AUTO: 0.2 % (ref 0–0.5)
LDLC SERPL CALC-MCNC: 79.2 MG/DL (ref 63–159)
LYMPHOCYTES # BLD AUTO: 1.43 K/UL (ref 1–4.8)
MCH RBC QN AUTO: 28.6 PG (ref 27–31)
MCHC RBC AUTO-ENTMCNC: 30.1 G/DL (ref 32–36)
MCV RBC AUTO: 95 FL (ref 82–98)
NONHDLC SERPL-MCNC: 102 MG/DL
NUCLEATED RBC (/100WBC) (OHS): 0 /100 WBC
PLATELET # BLD AUTO: 240 K/UL (ref 150–450)
PMV BLD AUTO: 10.1 FL (ref 9.2–12.9)
POTASSIUM SERPL-SCNC: 4.7 MMOL/L (ref 3.5–5.1)
PROT SERPL-MCNC: 7.3 GM/DL (ref 6–8.4)
RBC # BLD AUTO: 3.78 M/UL (ref 4.6–6.2)
RELATIVE EOSINOPHIL (OHS): 7.4 %
RELATIVE LYMPHOCYTE (OHS): 17.9 % (ref 18–48)
RELATIVE MONOCYTE (OHS): 10.7 % (ref 4–15)
RELATIVE NEUTROPHIL (OHS): 62.8 % (ref 38–73)
SODIUM SERPL-SCNC: 141 MMOL/L (ref 136–145)
TRIGL SERPL-MCNC: 114 MG/DL (ref 30–150)
WBC # BLD AUTO: 8.01 K/UL (ref 3.9–12.7)

## 2025-05-01 PROCEDURE — 80061 LIPID PANEL: CPT

## 2025-05-01 PROCEDURE — 85025 COMPLETE CBC W/AUTO DIFF WBC: CPT

## 2025-05-01 PROCEDURE — 36415 COLL VENOUS BLD VENIPUNCTURE: CPT | Mod: PN

## 2025-05-01 PROCEDURE — 82040 ASSAY OF SERUM ALBUMIN: CPT

## 2025-05-02 ENCOUNTER — RESULTS FOLLOW-UP (OUTPATIENT)
Dept: FAMILY MEDICINE | Facility: CLINIC | Age: 80
End: 2025-05-02

## 2025-05-09 ENCOUNTER — OFFICE VISIT (OUTPATIENT)
Dept: FAMILY MEDICINE | Facility: CLINIC | Age: 80
End: 2025-05-09
Payer: MEDICARE

## 2025-05-09 ENCOUNTER — PATIENT OUTREACH (OUTPATIENT)
Dept: ADMINISTRATIVE | Facility: HOSPITAL | Age: 80
End: 2025-05-09
Payer: MEDICARE

## 2025-05-09 VITALS
HEART RATE: 89 BPM | HEIGHT: 70 IN | DIASTOLIC BLOOD PRESSURE: 62 MMHG | WEIGHT: 191.38 LBS | OXYGEN SATURATION: 98 % | BODY MASS INDEX: 27.4 KG/M2 | SYSTOLIC BLOOD PRESSURE: 132 MMHG

## 2025-05-09 DIAGNOSIS — G47.33 OSA (OBSTRUCTIVE SLEEP APNEA): ICD-10-CM

## 2025-05-09 DIAGNOSIS — E03.9 HYPOTHYROIDISM, UNSPECIFIED TYPE: ICD-10-CM

## 2025-05-09 DIAGNOSIS — E78.2 MIXED HYPERLIPIDEMIA: ICD-10-CM

## 2025-05-09 DIAGNOSIS — Z79.899 DRUG THERAPY: Primary | ICD-10-CM

## 2025-05-09 DIAGNOSIS — N18.32 STAGE 3B CHRONIC KIDNEY DISEASE: ICD-10-CM

## 2025-05-09 DIAGNOSIS — Z15.89 METHYLENETETRAHYDROFOLATE REDUCTASE (MTHFR) GENE MUTATION: ICD-10-CM

## 2025-05-09 DIAGNOSIS — I10 ESSENTIAL HYPERTENSION: ICD-10-CM

## 2025-05-09 DIAGNOSIS — E11.69 TYPE 2 DIABETES MELLITUS WITH OTHER SPECIFIED COMPLICATION, WITHOUT LONG-TERM CURRENT USE OF INSULIN: ICD-10-CM

## 2025-05-09 DIAGNOSIS — I65.29 CAROTID ATHEROSCLEROSIS, UNSPECIFIED LATERALITY: ICD-10-CM

## 2025-05-09 PROCEDURE — 99999 PR PBB SHADOW E&M-EST. PATIENT-LVL IV: CPT | Mod: PBBFAC,,, | Performed by: FAMILY MEDICINE

## 2025-05-09 NOTE — LETTER
FAX      AUTHORIZATION FOR RELEASE OF   CONFIDENTIAL INFORMATION        We are seeing Jadiel Rossi, date of birth 1945, in the clinic at [facility]. Reji Lemos MD is the patient's PCP. Jadiel Rossi has an outstanding lab/procedure at the time we reviewed their chart. In order to help keep their health information updated, Jadiel Rossi has authorized us to request the following medical record(s):     ()  MAMMOGRAM (WITHIN 2 YRS)  ()  COLON/PATH W/RECALL (WITHIN 10 YRS)     ()  PAP SMEAR (WITHIN 3 YRS)      ()  OUTSIDE LAB RESULTS    ()  DEXA SCAN (WITHIN 3 YRS)      (X) DM EYE EXAM (WITHIN 48 MONTHS)          ()  FOOT EXAM (WITHIN 1yr.)          () OUTSIDE IMMUNIZATIONS    ()  OTHER                                   Please fax records to [facility] [phone].     If you have any questions, please contact Hietshjuan f @ [phone].             Jadiel Rossi  MRN: [id number]  : 1945  Age: 77 y.o.  Sex: male         Patient/Legal Guardian Signature  This signature was collected at 2023    Jadiel Rossi       _______________________________   Printed Name/Relationship to Patient      Consent for Examination and Treatment: I hereby authorize the providers and employees of [facility] ([facility]) to provide medical treatment/services which includes, but is not limited to, performing and administering tests and diagnostic procedures that are deemed necessary, including, but not limited to, imaging examinations, blood tests and other laboratory procedures as may be required by the hospital, clinic, or may be ordered by my physician(s) or persons working under the general and/or special instructions of my physician(s).      I understand and agree that this consent covers all authorized persons, including but not limited to physicians, residents, nurse practitioners, physicians' assistants, specialists, consultants, student nurses, and independently contracted physicians,  who are called upon by the physician in charge, to carry out the diagnostic procedures and medical or surgical treatment.     I hereby authorize [facility] to retain or dispose of any specimens or tissue, should there be such remaining from any test or procedure.     I hereby authorize and give consent for [facility] providers and employees to take photographs, images or videotapes of such diagnostic, surgical or treatment procedures of Patient as may be required by [facility] or as may be ordered by a physician. I further acknowledge and agree that [facility] may use cameras or other devices for patient monitoring.     I am aware that the practice of medicine is not an exact science, and I acknowledge that no guarantees have been made to me as to the outcome of any tests, procedures or treatment.     Authorization for Release of Information: I understand that my insurance company and/or their agents may need information necessary to make determinations about payment/reimbursement. I hereby provide authorization to release to all insurance companies, their successors, assignees, other parties with whom they may have contracted, or others acting on their behalf, that are involved with payment for any hospital and/or clinic charges incurred by the patient, any information that they request and deem necessary for payment/reimbursement, and/or quality review.  I further authorize the release of my health information to physicians or other health care practitioners on staff who are involved in my health care now and in the future, and to other health care providers, entities, or institutions for the purpose of my continued care and treatment, including referrals.

## 2025-05-09 NOTE — PROGRESS NOTES
THIS DOCUMENT WAS MADE IN PART WITH VOICE RECOGNITION SOFTWARE.  OCCASIONALLY THIS SOFTWARE WILL MISINTERPRET WORDS OR PHRASES.    Assessment and Plan:    1. Drug therapy  CBC Auto Differential    Comprehensive Metabolic Panel    Hemoglobin A1C    TSH    T4, Free      2. ROBBY (obstructive sleep apnea)        3. Type 2 diabetes mellitus with other specified complication, without long-term current use of insulin        4. Methylenetetrahydrofolate reductase (MTHFR) gene mutation        5. Essential hypertension        6. Mixed hyperlipidemia        7. Carotid atherosclerosis, unspecified laterality        8. Stage 3b chronic kidney disease        9. Hypothyroidism, unspecified type                Assessment & Plan    PLAN SUMMARY:  > Continue Jardiance  > Continue metoprolol 50 mg daily  > Continue atorvastatin 20 mg daily  > Continue Entresto  > Continue Allopurinol  > Continue Eliquis  > Continue amlodipine 5 mg daily  > Continue Synthroid  > Order liver function, renal function, A1c, and thyroid panel tests  > Jadiel to limit Propel intake to 1-2 per day for hydration  > Follow up in 6 months    PLAN NOTE:  > Explained BUN and creatinine as markers of renal function and hydration status.  > Discussed balance between hydration needs and heart failure management.  > Explained benefits of isotonic fluids (e.g., Pedialyte, Propel) for hydration compared to plain water or high-electrolyte sports drinks.  > Jadiel to limit Propel intake to 1-2 per day for hydration.  > Educated on genetic influences on HDL cholesterol levels.  > Continued atorvastatin 20 mg daily.  > Continued Eliquis.  > Continued metoprolol 50 mg daily.  > Continued amlodipine 5 mg daily.  > Continued Entresto.  > Continued Jardiance.  > Continued Allopurinol.  > Continued Synthroid.  > Ordered liver function, renal function, A1c, thyroid panel to be completed prior to next visit.  > Follow up in 6 months.            Visit today included increased  "complexity associated with the care of the episodic problem above addressed and managing the longitudinal care of the patient due to the serious and/or complex managed problem(s) .      ______________________________________________________________________  Subjective:    Chief Complaint:  Chief Complaint   Patient presents with    Follow-up        HPI:  Jadiel is a 79 y.o. year old       History of Present Illness    CHIEF COMPLAINT:  Jadiel presents today for follow up.    CARDIOVASCULAR:  He has a history of heart failure and atrial fibrillation with 10% time in AFib. He has a pacemaker in place. He reports taking Eliquis without any bleeding complications, including no blood in stool, black stools, or epistaxis. His metoprolol dose has been adjusted to 50mg daily.    MEDICAL HISTORY:  He has chronic kidney disease stage 3A. He has well-controlled diabetes with A1c of 5.9%. He has iron deficiency anemia with current blood counts higher than his baseline. He was recently diagnosed with early macular degeneration.    SLEEP:  He has sleep apnea and is CPAP intolerant. He was evaluated and deemed not a candidate for Inspire therapy by sleep medicine.    MEDICATIONS:  He continues Synthroid for thyroid management. He reports consistent use of Allopurinol with no recent gout flares. He tolerates atorvastatin well and is compliant with its use.      ROS:  ROS as indicated in HPI.             History atrial fibrillation   Rx-Eliquis, Toprol XL 25  Cardiology- MD Ed   EP - MD Hector   "10% a fib" per Dr Tracy    Bradycardia  S/p Pacemaker    CHF  Rx : Jardiance, Beta blocker, Entresto, LASIX PRN         Type 2 diabetes mellitus   Rx :  Jardiance  Prev Rx-metformin  Prev a1c : 5.9      Obstructive sleep apnea   CPAP intolerant   "Not candidate for Inspire" per SLENT      Dyslipidemia , bilateral carotid artery disease, CAD s/p PCI x2-3  Rx-atorvastatin 20 mg   No exertional CP / SOB  Cardiology : MD Ed     "   Hypothyroidism   Rx-Synthroid 88 mcg   TSH nl      Essential hypertension   Rx- amlodipine 5 mg, Entresto BID, Metoprolol 25 mg      Anxiety   No current medication     Chronic kidney disease, proteinuria  Baseline creatinine- 1.4-1.6     History iron deficiency anemia  Baseline HCT 30-33   Taking OTC supplements      Persistent asthma   Rx-rescue inhaler    Fatty Liver     Hyperuricemia   Rx : Allopurinol   No recent flares       Past Medical History:  Past Medical History:   Diagnosis Date    Anemia     Anxiety     Asthma     Atrial fibrillation     admitted to [facility] 7/1/18 for AF w RVR; no cardioversion needed; Dr Mendes EPS. Slowed down on its own and back to NSR; placed on eliquis.     Carotid artery plaque     bilateral    Diabetes mellitus type 2 without retinopathy 02/20/2024    Humphrey Ramirez MD    Diabetes mellitus, type 2     Essential hypertension     Hypertension     Hypothyroidism     Liver dysfunction     Methylenetetrahydrofolate reductase (MTHFR) gene mutation     Mixed hyperlipidemia     Obesity     ROBBY (obstructive sleep apnea)     Proteinuria     Valvular heart disease        Past Surgical History:  Past Surgical History:   Procedure Laterality Date    APPENDECTOMY  2167-2019    COLONOSCOPY N/A 05/01/2019    Procedure: COLONOSCOPY;  Surgeon: Jimenez Solomon Jr., MD;  Location: [facility] ENDO;  Service: Endoscopy;  Laterality: N/A;    CORONARY ANGIOPLASTY WITH STENT PLACEMENT  2022    CORONARY STENT PLACEMENT  04/25/2017    LAD by Dr Ward    ESOPHAGOGASTRODUODENOSCOPY N/A 05/01/2019    Procedure: EGD (ESOPHAGOGASTRODUODENOSCOPY);  Surgeon: Jimenez Solomon Jr., MD;  Location: [facility] ENDO;  Service: Endoscopy;  Laterality: N/A;    IMPLANTATION OF LEADLESS PACEMAKER  12/31/2024    Procedure: Insertion Micra PPM ([employer]tr[employer]);  Surgeon: Reginald Looney MD;  Location: [facility] CATH;  Service: Cardiology;;    INSERTION OF IMPLANTABLE LOOP RECORDER  06/2024    PLACEMENT OF DIALYSIS ACCESS  12/31/2024     Procedure: Dialysis catheter insertion;  Surgeon: Reginald Looney MD;  Location: [facility] CATH;  Service: Cardiology;;    RIGHT HEART CATHETERIZATION  2024    Procedure: Right heart cath;  Surgeon: Reginald Looney MD;  Location: [facility] CATH;  Service: Cardiology;;    TRANSESOPHAGEAL ECHOCARDIOGRAM WITH POSSIBLE CARDIOVERSION (YNES W/ POSS CARDIOVERSION) N/A 2024    Procedure: TRANSESOPHAGEAL ECHOCARDIOGRAM WITH POSSIBLE CARDIOVERSION (YNES W/ POSS CARDIOVERSION);  Surgeon: Miguel Ward MD;  Location: [facility];  Service: Cardiology;  Laterality: N/A;       Family History:  Family History   Problem Relation Name Age of Onset    Cancer Mother      Heart disease Mother      Heart disease Father      Arthritis Father      Hypertension Father      Cancer Sister          breast cancer    Diabetes Sister      Asthma Brother      COPD Brother      Diabetes Brother      Stroke Brother      Cancer Sister          leukemia    Early death Sister           at 64 of leukemia    Colon cancer Neg Hx      Crohn's disease Neg Hx      Esophageal cancer Neg Hx      Ulcerative colitis Neg Hx      Stomach cancer Neg Hx         Social History:  Social History     Socioeconomic History    Marital status:      Spouse name: Gwen   Occupational History    Occupation:  HS.   Tobacco Use    Smoking status: Never     Passive exposure: Never    Smokeless tobacco: Never   Substance and Sexual Activity    Alcohol use: Yes     Alcohol/week: 8.0 standard drinks of alcohol     Types: 8 Shots of liquor per week    Drug use: No     Social Drivers of Health     Financial Resource Strain: Low Risk  (2/3/2025)    Overall Financial Resource Strain (CARDIA)     Difficulty of Paying Living Expenses: Not hard at all   Food Insecurity: No Food Insecurity (2/3/2025)    Hunger Vital Sign     Worried About Running Out of Food in the Last Year: Never true     Ran Out of Food in the Last Year: Never true   Transportation Needs:  No Transportation Needs (12/26/2024)    TRANSPORTATION NEEDS     Transportation : No   Physical Activity: Unknown (2/3/2025)    Exercise Vital Sign     Days of Exercise per Week: 1 day   Stress: No Stress Concern Present (2/3/2025)    [employer] - Occupational Stress Questionnaire     Feeling of Stress : Only a little   Housing Stability: Unknown (2/3/2025)    Housing Stability Vital Sign     Unable to Pay for Housing in the Last Year: No     Homeless in the Last Year: No       Medications:  Current Outpatient Medications on File Prior to Visit   Medication Sig Dispense Refill    acetaminophen (TYLENOL) 325 MG tablet Take 2 tablets (650 mg total) by mouth every 4 (four) hours as needed for Pain.      albuterol (PROVENTIL/VENTOLIN HFA) 90 mcg/actuation inhaler INHALE 2 PUFFS INTO THE LUNGS EVERY 6 HOURS AS NEEDED FOR WHEEZING 54 g 3    allopurinoL (ZYLOPRIM) 100 MG tablet Take 1 tablet (100 mg total) by mouth once daily. 90 tablet 1    amLODIPine (NORVASC) 5 MG tablet TAKE 1 TABLET(5 MG) BY MOUTH EVERY DAY 90 tablet 3    atorvastatin (LIPITOR) 20 MG tablet Take 1 tablet (20 mg total) by mouth every evening. 90 tablet 3    blood sugar diagnostic Strp To check BG once daily before a meal, to use with insurance preferred meter 100 strip 3    clopidogreL (PLAVIX) 75 mg tablet Take 75 mg by mouth once daily.      ELIQUIS 5 mg Tab Take 5 mg by mouth 2 (two) times daily.      empagliflozin (JARDIANCE) 10 mg tablet TAKE 1 TABLET(10 MG) BY MOUTH DAILY 90 tablet 3    ferrous sulfate 325 (65 FE) MG EC tablet Take 1 tablet (325 mg total) by mouth once daily.      folic acid (FOLVITE) 1 MG tablet Take 1 tablet (1 mg total) by mouth once daily. 90 tablet 3    lancets Misc To check BG once daily before a meal, to use with insurance preferred meter 100 each 3    levothyroxine (SYNTHROID) 88 MCG tablet TAKE 1 TABLET(88 MCG) BY MOUTH BEFORE BREAKFAST 90 tablet 1    methocarbamoL (ROBAXIN) 750 MG Tab Take  750 mg by mouth every 12 (twelve) hours as needed.      metoprolol succinate (TOPROL-XL) 25 MG 24 hr tablet Take 25 mg by mouth 2 (two) times daily.      nitroGLYCERIN (NITROSTAT) 0.4 MG SL tablet   0    thiamine 100 MG tablet Take 1 tablet (100 mg total) by mouth once daily. 30 tablet 0    vitamin renal formula, B-complex-vitamin c-folic acid, (NEPHROCAP) 1 mg Cap Take 1 capsule by mouth once daily. 90 capsule 3    [DISCONTINUED] sacubitriL-valsartan (ENTRESTO) 24-26 mg per tablet Take 1 tablet by mouth 2 (two) times daily. 60 tablet 0     No current facility-administered medications on file prior to visit.       Allergies:  Multaq [dronedarone]    Immunizations:  Immunization History   Administered Date(s) Administered    COVID-19, MRNA, LN-S, PF (Pfizer) (Purple Cap) 01/10/2021, 01/31/2021, 08/19/2021    Influenza (FLUAD) - Quadrivalent - Adjuvanted - PF *Preferred* (65+) 09/24/2020    Influenza - Trivalent - Fluzone High Dose - PF (65 years and older) 09/10/2018, 03/05/2020    PPD Test 01/06/2025    Pneumococcal Conjugate - 13 Valent 09/10/2018    Pneumococcal Polysaccharide - 23 Valent 06/27/2023    Rsv, Bivalent, Rsvpref (Abrysvo) 12/06/2023    Tdap 08/24/2022    Zoster Recombinant 12/06/2023, 04/29/2024       Review of Systems:  Review of Systems   Constitutional:  Positive for unexpected weight change. Negative for activity change.   HENT:  Negative for hearing loss, rhinorrhea and trouble swallowing.    Eyes:  Negative for discharge and visual disturbance.   Respiratory:  Negative for chest tightness and wheezing.    Cardiovascular:  Negative for chest pain and palpitations.   Gastrointestinal:  Negative for blood in stool, constipation, diarrhea and vomiting.   Endocrine: Negative for polydipsia and polyuria.   Genitourinary:  Negative for difficulty urinating, hematuria and urgency.   Musculoskeletal:  Negative for arthralgias, joint swelling and neck pain.   Neurological:  Negative for weakness and  "headaches.   Psychiatric/Behavioral:  Negative for confusion and dysphoric mood.    All other systems reviewed and are negative.      Objective:    Vitals:  Vitals:    05/09/25 1110   BP: 132/62   Pulse: 89   SpO2: 98%   Weight: 86.8 kg (191 lb 5.8 oz)   Height: 5' 10" (1.778 m)   PainSc: 0-No pain       Physical Exam  Vitals reviewed.   Constitutional:       General: He is not in acute distress.  HENT:      Head: Normocephalic and atraumatic.   Eyes:      Pupils: Pupils are equal, round, and reactive to light.   Cardiovascular:      Rate and Rhythm: Normal rate and regular rhythm.      Heart sounds: No murmur heard.     No friction rub.   Pulmonary:      Effort: Pulmonary effort is normal.      Breath sounds: Normal breath sounds.   Abdominal:      General: Bowel sounds are normal. There is no distension.      Palpations: Abdomen is soft.      Tenderness: There is no abdominal tenderness.   Musculoskeletal:      Cervical back: Neck supple.   Skin:     General: Skin is warm and dry.      Findings: No rash.   Psychiatric:         Behavior: Behavior normal.             Reji Lemos MD  Family Medicine          "

## 2025-05-12 NOTE — PROGRESS NOTES
Population Health Chart Review & Patient Outreach Details      Additional [facility] Health Notes:               Updates Requested / Reviewed:      Updated Care Coordination Note and Immunizations Reconciliation Completed or Queried: [place]         Health Maintenance Topics Overdue:      [facility] Score: 1     Eye Exam                       Health Maintenance Topic(s) Outreach Outcomes & Actions Taken:    Eye Exam - Outreach Outcomes & Actions Taken  : External Records Requested & Care Team Updated if Applicable

## 2025-05-14 ENCOUNTER — EXTERNAL HOME HEALTH (OUTPATIENT)
Dept: HOME HEALTH SERVICES | Facility: HOSPITAL | Age: 80
End: 2025-05-14
Payer: MEDICARE

## 2025-05-23 ENCOUNTER — E-VISIT (OUTPATIENT)
Dept: FAMILY MEDICINE | Facility: CLINIC | Age: 80
End: 2025-05-23
Payer: MEDICARE

## 2025-05-23 DIAGNOSIS — L73.9 FOLLICULITIS: Primary | ICD-10-CM

## 2025-05-23 RX ORDER — DOXYCYCLINE 100 MG/1
100 CAPSULE ORAL 2 TIMES DAILY
Qty: 14 CAPSULE | Refills: 0 | Status: SHIPPED | OUTPATIENT
Start: 2025-05-23

## 2025-05-23 NOTE — PROGRESS NOTES
Patient ID: Jadiel Rossi is a 79 y.o. male.        E-Visit Time Tracking:   Day 1 Time (in minutes): 5  Total Time (in minutes): 5      Chief Complaint: General Illness (Entered automatically based on patient selection in [employer].)      The patient initiated a request through [employer] on 5/23/2025 for evaluation and management with a chief complaint of General Illness (Entered automatically based on patient selection in [employer].)     I evaluated the questionnaire submission on 05/23/2025.    [facility] Peq Evisit Supergroup-Common Problems    5/23/2025  6:23 AM CDT - Filed by Patient   What do you need help with? Rash   Do you agree to participate in an E-Visit? Yes   If you have any of the following symptoms, please present to your local emergency room or call 911:  I acknowledge   What is the main issue you would like addressed today? I get. Have now foliculitis. Puss that forms on the stem or y he base of my hair. The root becomes inflamed. I believe i need an antibiotic. Or is yhere another effective treatment?   How would you describe your skin concern? Growth   When did your concern begin? 1/1/2025   Where is your skin concern located? Scalp   Does the affected area itch? No   Does the affected area hurt? No   Does the affected area have discharge or drainage? Yes   Describe the discharge or drainage. Clear   Have you noticed any bleeding in the affected area? Yes   How would you describe your skin concern? Blistered   How would you describe the color of the affected area(s)? Red   How has the affected area changed over time? Size changes randomly   How often do you have this skin concern? Comes and goes   How long does your skin problem last? Weeks   Have you been exposed to any of the following? None   Have you used any of the following to treat your skin concern? Antibiotics   If you have used anything for treatment, has it helped the symptoms? No   Do you have any of the following additional symptoms with your  skin concern? None   Provide any additional information you feel is important.    At least one photo is required for treatment to be provided. You can upload a maximum of three photos of the affected area.  File not available.   Are you able to take your vital signs? Yes   Systolic Blood Pressure: 120   Diastolic Blood Pressure: 65   Weight: 185   Height: 72   Pulse: 78   Temperature: 98.6   Respiration rate: 8   Pulse Oxygen: 98         Encounter Diagnosis   Name Primary?    Folliculitis Yes        No orders of the defined types were placed in this encounter.     Medications Ordered This Encounter   Medications    doxycycline (MONODOX) 100 MG capsule     Sig: Take 1 capsule (100 mg total) by mouth 2 (two) times daily.     Dispense:  14 capsule     Refill:  0        No follow-ups on file.

## 2025-05-30 DIAGNOSIS — N28.9 ACUTE RENAL INSUFFICIENCY: ICD-10-CM

## 2025-05-30 DIAGNOSIS — E03.9 HYPOTHYROIDISM, UNSPECIFIED TYPE: ICD-10-CM

## 2025-05-30 DIAGNOSIS — D63.8 ANEMIA OF CHRONIC DISEASE: ICD-10-CM

## 2025-05-30 RX ORDER — LEVOTHYROXINE SODIUM 88 UG/1
88 TABLET ORAL
Qty: 90 TABLET | Refills: 2 | Status: SHIPPED | OUTPATIENT
Start: 2025-05-30

## 2025-05-30 NOTE — TELEPHONE ENCOUNTER
No care due was identified.  [facility] Embedded Care Due Messages. Reference number: [id number].   5/30/2025 2:49:45 PM CDT

## 2025-05-31 NOTE — TELEPHONE ENCOUNTER
Refill Decision Note   Jadiel Rossi  is requesting a refill authorization.  Brief Assessment and Rationale for Refill:  Approve     Medication Therapy Plan:         Comments:     Note composed:11:32 PM 05/30/2025             Appointments     Last Visit   5/23/2025 Reji Lemos MD   Next Visit   11/11/2025 Reji Lemos MD

## 2025-06-12 RX ORDER — ALLOPURINOL 100 MG/1
TABLET ORAL
Qty: 90 TABLET | Refills: 1 | Status: SHIPPED | OUTPATIENT
Start: 2025-06-12

## 2025-06-12 NOTE — TELEPHONE ENCOUNTER
No care due was identified.  [facility] Embedded Care Due Messages. Reference number: [id number].   6/12/2025 8:04:40 AM CDT

## 2025-06-12 NOTE — TELEPHONE ENCOUNTER
Refill Routing Note   Medication(s) are not appropriate for processing by [facility] for the following reason(s):        Drug-disease interaction    [facility] action(s):  Defer      Medication Therapy Plan: Drug-Disease: allopurinoL and Stage 3b chronic kidney disease    Pharmacist review requested: Yes     Appointments  past 12m or future 3m with PCP    Date Provider   Last Visit   5/23/2025 Reji Lemos MD   Next Visit   11/11/2025 Reji Lemos MD   ED visits in past 90 days: 0        Note composed:12:27 PM 06/12/2025